# Patient Record
Sex: MALE | Race: WHITE | NOT HISPANIC OR LATINO | Employment: FULL TIME | URBAN - METROPOLITAN AREA
[De-identification: names, ages, dates, MRNs, and addresses within clinical notes are randomized per-mention and may not be internally consistent; named-entity substitution may affect disease eponyms.]

---

## 2017-05-01 ENCOUNTER — TELEPHONE (OUTPATIENT)
Dept: INTERNAL MEDICINE | Facility: CLINIC | Age: 27
End: 2017-05-01

## 2017-05-01 NOTE — TELEPHONE ENCOUNTER
----- Message from Tea Estrella sent at 4/28/2017  6:37 AM CDT -----  Contact: [place] [employer] request  Message     Appointment Request From: Dong Denny        With Provider: Other - (see comments) [oth]        Would Accept With:Request to schedule a test or procedure        Preferred Date Range: From 4/27/2017 To 5/12/2017        Preferred Times: Any        Reason for visit: Request an Appt        Comments:    Would like to schedule test to measure testosterone levels due to recent fatigue, loss of focus, and drop in sex drive.  First appointment available with any provider.

## 2017-06-19 ENCOUNTER — OFFICE VISIT (OUTPATIENT)
Dept: INTERNAL MEDICINE | Facility: CLINIC | Age: 27
End: 2017-06-19
Attending: FAMILY MEDICINE
Payer: COMMERCIAL

## 2017-06-19 ENCOUNTER — CLINICAL SUPPORT (OUTPATIENT)
Dept: INTERNAL MEDICINE | Facility: CLINIC | Age: 27
End: 2017-06-19
Payer: COMMERCIAL

## 2017-06-19 VITALS
BODY MASS INDEX: 24.32 KG/M2 | OXYGEN SATURATION: 98 % | DIASTOLIC BLOOD PRESSURE: 70 MMHG | WEIGHT: 164.19 LBS | SYSTOLIC BLOOD PRESSURE: 108 MMHG | HEART RATE: 60 BPM | HEIGHT: 69 IN

## 2017-06-19 DIAGNOSIS — Z00.00 ROUTINE GENERAL MEDICAL EXAMINATION AT A HEALTH CARE FACILITY: Primary | ICD-10-CM

## 2017-06-19 DIAGNOSIS — Z77.21 EXPOSURE TO BLOOD: ICD-10-CM

## 2017-06-19 DIAGNOSIS — Z00.00 ANNUAL PHYSICAL EXAM: Primary | ICD-10-CM

## 2017-06-19 DIAGNOSIS — M20.60 TOE DEFORMITY, UNSPECIFIED LATERALITY: ICD-10-CM

## 2017-06-19 LAB
ALBUMIN SERPL BCP-MCNC: 4.5 G/DL
ALP SERPL-CCNC: 57 U/L
ALT SERPL W/O P-5'-P-CCNC: 24 U/L
ANION GAP SERPL CALC-SCNC: 10 MMOL/L
AST SERPL-CCNC: 16 U/L
BILIRUB SERPL-MCNC: 1.2 MG/DL
BUN SERPL-MCNC: 14 MG/DL
CALCIUM SERPL-MCNC: 10 MG/DL
CHLORIDE SERPL-SCNC: 104 MMOL/L
CHOLEST/HDLC SERPL: 3.5 {RATIO}
CO2 SERPL-SCNC: 27 MMOL/L
CREAT SERPL-MCNC: 1 MG/DL
ERYTHROCYTE [DISTWIDTH] IN BLOOD BY AUTOMATED COUNT: 12.4 %
EST. GFR  (AFRICAN AMERICAN): >60 ML/MIN/1.73 M^2
EST. GFR  (NON AFRICAN AMERICAN): >60 ML/MIN/1.73 M^2
ESTIMATED AVG GLUCOSE: 97 MG/DL
GLUCOSE SERPL-MCNC: 106 MG/DL
HBA1C MFR BLD HPLC: 5 %
HCT VFR BLD AUTO: 46.3 %
HDL/CHOLESTEROL RATIO: 28.8 %
HDLC SERPL-MCNC: 153 MG/DL
HDLC SERPL-MCNC: 44 MG/DL
HGB BLD-MCNC: 16.1 G/DL
HIV 1+2 AB+HIV1 P24 AG SERPL QL IA: NEGATIVE
LDLC SERPL CALC-MCNC: 82.8 MG/DL
MCH RBC QN AUTO: 30.6 PG
MCHC RBC AUTO-ENTMCNC: 34.8 %
MCV RBC AUTO: 88 FL
NONHDLC SERPL-MCNC: 109 MG/DL
PLATELET # BLD AUTO: 280 K/UL
PMV BLD AUTO: 9.3 FL
POTASSIUM SERPL-SCNC: 4.2 MMOL/L
PROT SERPL-MCNC: 7.5 G/DL
RBC # BLD AUTO: 5.26 M/UL
RPR SER QL: NORMAL
SODIUM SERPL-SCNC: 141 MMOL/L
TRIGL SERPL-MCNC: 131 MG/DL
WBC # BLD AUTO: 4.04 K/UL

## 2017-06-19 PROCEDURE — 85027 COMPLETE CBC AUTOMATED: CPT

## 2017-06-19 PROCEDURE — 99385 PREV VISIT NEW AGE 18-39: CPT | Mod: S$GLB,,, | Performed by: FAMILY MEDICINE

## 2017-06-19 PROCEDURE — 86703 HIV-1/HIV-2 1 RESULT ANTBDY: CPT

## 2017-06-19 PROCEDURE — 86592 SYPHILIS TEST NON-TREP QUAL: CPT

## 2017-06-19 PROCEDURE — 83036 HEMOGLOBIN GLYCOSYLATED A1C: CPT

## 2017-06-19 PROCEDURE — 97802 MEDICAL NUTRITION INDIV IN: CPT | Mod: S$GLB,,, | Performed by: INTERNAL MEDICINE

## 2017-06-19 PROCEDURE — 99999 PR PBB SHADOW E&M-EST. PATIENT-LVL IV: CPT | Mod: PBBFAC,,, | Performed by: FAMILY MEDICINE

## 2017-06-19 PROCEDURE — 36415 COLL VENOUS BLD VENIPUNCTURE: CPT

## 2017-06-19 PROCEDURE — 80053 COMPREHEN METABOLIC PANEL: CPT

## 2017-06-19 PROCEDURE — 80061 LIPID PANEL: CPT

## 2017-06-19 PROCEDURE — 97750 PHYSICAL PERFORMANCE TEST: CPT | Mod: S$GLB,,, | Performed by: INTERNAL MEDICINE

## 2017-06-19 NOTE — PROGRESS NOTES
Nutrition Assessment  Client name:  Dong Denny  :  1990  Age:  26 y.o.  Gender:  male    Client states:  Very pleasant [employer] employee here for his annual [employer] physical.  Missed nutrition consult last year due to lack of availability yet is grateful for today's consult!  Has an unremarkable PMH.  Is health conscious overall, noting that he exercises 5-6x/week, including cardio and strength training.  Recently, incorporated cardio 2x/week due to medical recommendations.  Has been using My Fitness Pal for the past two years, noting that he individualized his goals to include 2300 kcal, 35% CHO, 35% PRO, and 30% fat.  Aims for 150 gm protein/day (1 gm/lb body weight) although typically averages 110 gm daily.  Consumes 40 gm protein post workout via 1 scoop protein powder (24 gm) + 20 oz 2% milk.  Recently, moved near [facility] and since then, has been eating out more frequently than usual, averaging 4x/week in contrast to 1x/week.  Is considering meal delivery companies as a way to reduce eating out temptation.  Overall, was receptive toward nutrition recommendations, expressing gratitude for education received.              No past medical history on file.    Social History    Marital status:  Single  Employment:  [employer]  Social History   Substance Use Topics    Smoking status: Never Smoker    Smokeless tobacco: Not on file    Alcohol use Yes      Comment: socially        Current medications:  has a current medication list which includes the following prescription(s): finasteride.  Vitamins, minerals, and/or supplements:  MVI, whey protein powder (24 gm protein/scoop) post workout  Food allergies or intolerances:  NKFA     Food History  Breakfast:  Skips  Lunch:  Ground turkey + beans or peas/6 inch [employer] rotisserie chicken or meatball marinara sandwich + water  Mid-afternoon Snack:  30 gm almonds  Post workout:  1 scoop whey protein powder (24 gm protein) + 20 oz 2%  "milk  Dinner:  Grilled chicken breast + beans or peas + water    Exercise History:  20-45 minutes cardio 2x/week + 45 minutes strength training 3x/week    Lab Reports   Total Cholesterol:  153    Triglycerides:  131  HDL:  44  LDL:  82.8   Glucose:  106  HbA1c:  5%  BP:  108/70     Weight History  Height:  5' 9"     Weight:  162#  BMI:  23.9  % Body Fat:  17.85%    Diagnosis  RMR (Method:  Body [place]):  1760 kcal  Activity Factor:  1.4  MARINO:  2464 kcal   Nutrition Prescription:  2400 kcal, 45% CHO, 20% PRO, 35% Fat    Food and nutrition-related knowledge deficit related to inadequate nutrition education as evidenced by patient verbalizes inadequate nutrition education re: macronutrient needs.    Intervention    Goals:  1.  Reduce protein intake post workout to 15-25 gm (1/2 scoop whey protein + 8 oz milk) per ACSM guidelines  2.  Aim for 2400 kcal, 45% CHO, 20% PRO, 35% Fat  3.  Limit eating out to 2x/week  4.  Avoid skipping breakfast (examples of healthy breakfast choices provided)  5.  Incorporate ½ plate of non-starchy vegetables with lunch and dinner  6.  Continue current exercise regimen    Reviewed CMP, lipid panel, and HbA1c.  Praised patient for healthy and active lifestyle, encouraging patient to maintain such healthy habits.  Discussed BMR, MARINO, and daily macronutrient goals, specifically daily protein needs, post workout nutrition, and the potential health consequences of excessive protein intake.  Advised patient to avoid skipping meals, providing him with healthy breakfast choices that are light, convenient, and healthy.  Slight changes in lipid panel noted, likely attributed to increase in eating out frequency.  Encouraged patient to reduce eating out frequency, preparing more meals at home inclusive of added non-starchy vegetables.  Provided patient with list of such vegetables and discussed.  Also, answered patient's questions re: meal delivery companies.      Handouts provided:  Meal Planning " Guide  Restaurant Guide  Eat Fit Shopping List  Eat Fit Natalie  Fast Food Guide  Vitamin/Mineral Guide    Monitoring/Evaluation    Monitor the following:  Weight  BMI  Calorie and macronutrient intake    Follow Up Plan:  Follow up with client in 1-2 years

## 2017-06-19 NOTE — PROGRESS NOTES
Subjective:       Patient ID: Dong Denny is a 26 y.o. male.    Chief Complaint: No chief complaint on file.    HPI   Mr. Denny has reported no previous history of cardiovascular or pulmonary disease. He has reported no physical limitations to exercise. Mr. Denny is very interested and knowledgeable about both exercise and fitness. He mentioned to me that he is very conscious of his body fat percentage and resting metabolic rate. He use to take his body fat measurements with a handheld CLEO device, a standing CLEO device and calipers almost weekly. He is not worried about it as much anymore but is still interested in knowing it.     Current Exercise Routine:   - Weight lifting 3 days per week (upper and lower body)   - Cardio 3 days per week (HIIT training 30 minutes)     - Once every 2 week performs 45-60 minutes of endurance cardio    Review of Systems    Objective:      The fitness evaluation results are as follows:    D.O.S. 6/19/2017   Height (in): 69   Weight (lbs): 162   BMI: 23.60340   Body Fat (%): 17.85   Waist (cm): 80   Hip (cm): 93   WHR: 0.86   RBP (mmHg): 110/78   RHR (bpm): 62    Strength R (lbs)t: 96.78205    Strength Lt (lbs): 85.42395   Push-up Assessment: 43   Curl-up Assessment: 33   Flexibility Testing (cm): 40   REE (kcals): 1760     Physical Exam    Assessment:      Age/Gender Stratified Assessment:     Resting BP: Within Testing Limits   Body Fat %: Good   WHR Risk Factor: Low Risk    Strength R: Below Average    Strength L: Below Average   Upper Body Endurance: Excellent   Abdominal Endurance: Average   Lower body Flexibility: Excellent     1. Routine general medical examination at a health care facility        Plan:    Mr. Denny should continue with his current exercise routine in order to maintain his current level of fitness. The focus over the next year should be on increasing  strength. Both hands were measured below average. We talked about  performing dead lifts with a pronated  only for a few repetitions in order to help strengthen this. Daily stretching should also be incorporated to help increase level of flexibility. Lower body chair stretches were demonstrated in order to promote stretching at the workplace.

## 2017-06-19 NOTE — PROGRESS NOTES
Subjective:       Patient ID: Dong Denny is a 26 y.o. male.    Chief Complaint: [employer]    New patient to establish care and annual wellness check, physical exam.        Review of Systems   Constitutional: Negative for chills, fatigue and fever.   HENT: Negative for congestion and trouble swallowing.    Eyes: Negative for redness.   Respiratory: Negative for cough, chest tightness and shortness of breath.    Cardiovascular: Negative for chest pain, palpitations and leg swelling.   Gastrointestinal: Negative for abdominal pain and blood in stool.   Genitourinary: Negative for hematuria.   Musculoskeletal: Negative for arthralgias, back pain, gait problem, joint swelling, myalgias and neck pain.   Skin: Negative for color change and rash.   Neurological: Negative for tremors, speech difficulty, weakness, numbness and headaches.   Hematological: Negative for adenopathy. Does not bruise/bleed easily.   Psychiatric/Behavioral: Negative for behavioral problems, confusion and sleep disturbance. The patient is not nervous/anxious.        Objective:      Physical Exam   Constitutional: He appears well-developed and well-nourished.   HENT:   Head: Normocephalic.   Right Ear: Tympanic membrane, external ear and ear canal normal.   Left Ear: Tympanic membrane, external ear and ear canal normal.   Mouth/Throat: Oropharynx is clear and moist.   Eyes: Pupils are equal, round, and reactive to light.   Neck: Normal range of motion. Neck supple. Carotid bruit is not present. No thyromegaly present.   Cardiovascular: Normal rate, regular rhythm, normal heart sounds and intact distal pulses.  Exam reveals no gallop and no friction rub.    No murmur heard.  Pulmonary/Chest: Effort normal and breath sounds normal.   Abdominal: Soft. Bowel sounds are normal. He exhibits no distension and no mass. There is no tenderness. There is no rebound and no guarding.   Musculoskeletal: Normal range of motion. He exhibits no  edema or tenderness.   Lymphadenopathy:     He has no cervical adenopathy.   Neurological: He is alert. He has normal strength. He displays normal reflexes. No cranial nerve deficit or sensory deficit. Coordination and gait normal.   Skin: Skin is warm and dry.   Psychiatric: He has a normal mood and affect. His behavior is normal. Judgment and thought content normal.   Nursing note and vitals reviewed.      Assessment:       1. Annual physical exam    2. Exposure to blood    3. Toe deformity, unspecified laterality        Plan:   Dong was seen today for [facility].    Diagnoses and all orders for this visit:    Annual physical exam    Exposure to blood  -     HIV-1 and HIV-2 antibodies; Future  -     RPR; Future    Toe deformity, unspecified laterality  -     Ambulatory referral to Podiatry      See meds, orders, follow up, routing and instructions sections of encounter.  Dictation #1  MRN:[id number]  CSN:[id number]
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
## 2017-07-03 ENCOUNTER — OFFICE VISIT (OUTPATIENT)
Dept: PODIATRY | Facility: CLINIC | Age: 27
End: 2017-07-03
Payer: COMMERCIAL

## 2017-07-03 ENCOUNTER — HOSPITAL ENCOUNTER (OUTPATIENT)
Dept: RADIOLOGY | Facility: HOSPITAL | Age: 27
Discharge: HOME | End: 2017-07-03
Attending: PODIATRIST
Payer: COMMERCIAL

## 2017-07-03 ENCOUNTER — PATIENT MESSAGE (OUTPATIENT)
Dept: PODIATRY | Facility: CLINIC | Age: 27
End: 2017-07-03

## 2017-07-03 VITALS
HEIGHT: 69 IN | DIASTOLIC BLOOD PRESSURE: 70 MMHG | WEIGHT: 165.69 LBS | BODY MASS INDEX: 24.54 KG/M2 | HEART RATE: 65 BPM | SYSTOLIC BLOOD PRESSURE: 110 MMHG

## 2017-07-03 DIAGNOSIS — M20.42 HAMMER TOE OF LEFT FOOT: ICD-10-CM

## 2017-07-03 DIAGNOSIS — M24.50 JOINT CONTRACTURE: ICD-10-CM

## 2017-07-03 DIAGNOSIS — Q66.89 CURLY TOE: ICD-10-CM

## 2017-07-03 DIAGNOSIS — M20.42 HAMMER TOE OF LEFT FOOT: Primary | ICD-10-CM

## 2017-07-03 PROCEDURE — 99203 OFFICE O/P NEW LOW 30 MIN: CPT | Mod: S$GLB,,, | Performed by: PODIATRIST

## 2017-07-03 PROCEDURE — 73630 X-RAY EXAM OF FOOT: CPT | Mod: TC,LT

## 2017-07-03 PROCEDURE — 73630 X-RAY EXAM OF FOOT: CPT | Mod: 26,LT,, | Performed by: RADIOLOGY

## 2017-07-03 PROCEDURE — 99999 PR PBB SHADOW E&M-EST. PATIENT-LVL III: CPT | Mod: PBBFAC,,, | Performed by: PODIATRIST

## 2017-07-03 NOTE — PATIENT INSTRUCTIONS
PRE OPERATIVE INSTRUCTIONS      Surgery has been scheduled with Dr. Umanzor on    On the date of surgery, please report to the Pre Operative Registration office 2 hours before surgery.    You should report to:  ______[facility], 2nd floor Surgery Center, [address] [place] [address]  ([phone])  ______ [facility], located on the [place] side of the first floor   of the [facility] ([phone])  ______ The Second Floor Surgery Center, located on the [facility] side of the   second floor of the [facility] ([phone])      Pre Surgical Physical Exam and Testing    - A history and physical exam performed by your primary care physician, internest, cardiologist or pediatrician is required within 30 days prior to your surgery. The completed history & physical note, preop clearance and appropriate tests including EKG, CBC, BMP and CXR must be faxed to our office no later than 3 days prior to surgery.     Fax- [phone]    Please note the following instructions for your upcoming surgery:    1. Do not eat or drink anything after midnight the night before surgery.  2. The night before surgery, take a complete shower or bath and clean the entire extremity with antibacterial or Hibiclens (chlorhexidine gluconate) soap.  3. Stop taking all anti-inflammatory medications 7 days prior to your procedure.    -This includes Ibuprofen, Aleve, Motrin, Advil, Aspirin, Ibuprofen, etc. Tylenol products are OK.  4. If you take Coumadin or Plavix, please discuss stopping these medications with your prescribing physician. These medications should generally be stopped 5-7 days before surgery. Please notify us with what your doctor's instructions are regarding stopping and re-starting these medications.  5. Notify your doctor if you are diabetic and provide information about the   medications you take.    PLEASE NOTE   If you are allergic to any medications,  please inform your doctor or the nurse   responsible for your care.   Arrange for someone to drive you home following surgery. You will not be allowed   to leave the surgical facility alone or drive yourself home following sedation and   anesthesia.   If you have not already done so, please bring a list of your medications with you   the day of your surgery   Refrain from drinking alcoholic beverages for 24 hours before and after surgery    Recommend using knee walker (rent or buy) when nonweightbearing is required.   (Example: [employer] [address] Steerable Knee Walker)    If you have not been contacted by the Pre-Operative Center by one to three days prior to  your surgery, please call [phone] or [phone] between the hours of 8:00 a.m. and 4:00 p.m. Monday through Friday.    If you have any questions, please contact our office.   [phone]

## 2017-07-12 NOTE — PROGRESS NOTES
Subjective:      Patient ID: Dong Denny is a 26 y.o. male.    Chief Complaint: Foot Problem (left ft. 5th toe) and Toe Pain (PCP Dr. RODRIGUEZ 9/19/16)    Dong is a 26 y.o. male who presents to the podiatry clinic  with complaint of  left foot pain. Onset of the symptoms was several years ago. Precipitating event: position of little toe rubbing in shoes. Current symptoms include: pain at tip at top of left 5th toe with activity and pressure in most closed toe shoes. Aggravating factors: running and walking, especially bad with playing soccer. Symptoms have waxed and waned but are worse overall. Patient has had no prior foot problems. Evaluation to date: none. Treatment to date: avoidance of offending activity, OTC analgesics which are not very effective and soft and extra depth shoes. Patients rates pain 8/10 on pain scale.        Review of Systems   Constitution: Negative for chills, fever, weakness, malaise/fatigue and night sweats.   Cardiovascular: Negative for chest pain, leg swelling, orthopnea and palpitations.   Respiratory: Negative for cough, shortness of breath and wheezing.    Skin: Negative for color change, itching, poor wound healing and rash.   Musculoskeletal: Positive for joint pain. Negative for arthritis, gout, joint swelling, muscle weakness and myalgias.   Gastrointestinal: Negative for abdominal pain, constipation and nausea.   Neurological: Negative for disturbances in coordination, dizziness, focal weakness, numbness and tremors.           Objective:      Physical Exam   Constitutional: He is oriented to person, place, and time. Vital signs are normal. He appears well-developed. He is cooperative. No distress.   Cardiovascular: Intact distal pulses.    Pulses:       Dorsalis pedis pulses are 2+ on the right side, and 2+ on the left side.        Posterior tibial pulses are 2+ on the right side, and 2+ on the left side.   Musculoskeletal:        Right ankle: Normal.        Left ankle:  Normal.        Right foot: There is normal range of motion, no bony tenderness, normal capillary refill, no crepitus and no deformity.        Left foot: There is tenderness and deformity. There is normal range of motion, no bony tenderness, normal capillary refill and no crepitus.   Tenderness at distal tip and dorsal PIPJ, left 5th toe.    Reducible flexor contractures at the and PIPJ and DIPJ of toes 2-4, bilat. 5th toe contracture is only partially reducible, left.     Mid and hindfoot alignment neurtral in stance.        Neurological: He is alert and oriented to person, place, and time. He has normal strength. He displays no atrophy. No sensory deficit. He exhibits normal muscle tone. Gait normal.   Reflex Scores:       Achilles reflexes are 2+ on the right side and 2+ on the left side.  Skin: Skin is intact. No ecchymosis and no lesion noted. No erythema. Nails show no clubbing.   No foot and ankle edema.  No open wounds.               Assessment:       Encounter Diagnoses   Name Primary?    Hammer toe of left foot Yes    Joint contracture     Curly toe          Plan:       Dong was seen today for foot problem and toe pain.    Diagnoses and all orders for this visit:    Hammer toe of left foot  -     X-Ray Foot Complete Left; Future    Joint contracture  -     X-Ray Foot Complete Left; Future    Curly toe      I counseled the patient on his conditions, their implications and medical management.    Xray reviewed with patient noting flexor contractions a lesser toe IPJ joints, especially 4th and 5th. Concern for arthritic changes at 5th IPJ. Hindfoot and MTPJ alignment intact.     Discussed conservative treatments with padding, accommodative shoes, flexor stretching, splints vs. Surgery to reduce flexor contraction with flexor tenotomy and/or PIP arthroplasty.     He is interested in surgical treatment as he feels he has exhausted theses conservative options. He would like to plan the procedure after his  upcoming hiking trip to [place].     He will f/u for preop consult, prn           8/14/2017, Addendum: Patient called to request surgery to left 5th toe. Pain at left 5th toe has continued despite conservative care. Denies injuries or new problems. He would like to undergo correction of hammertoe with IPJ arthroplasty and/or flexor tenotomy as previously discussed next week, 8/24/17. States he has appt with PCP this week for pre op clearance. Discussed risks, benefits and potential complications in detail and all questions discussed. He has no other concerns at this time. We will review consent form on the morning of surgery.    8/24/17, Addendum: Patient seen in preop with plan for left 5th PIP joint arthroplasty, possible FDL tenotomy. He was cleared by PCP for surgery. Long discussion with patient reagarding the procedure in detail. Patient understands all risks, potential complications, and alternatives, including, but not limited to those listed on the consent form. All questions were answered. No guarantees given nor implied as to outcome. Informed verbal and written consent was obtained. Consent forms read, signed, witnessed. Patient is ready for surgery

## 2017-08-10 ENCOUNTER — PATIENT MESSAGE (OUTPATIENT)
Dept: PODIATRY | Facility: CLINIC | Age: 27
End: 2017-08-10

## 2017-08-14 ENCOUNTER — OFFICE VISIT (OUTPATIENT)
Dept: INTERNAL MEDICINE | Facility: CLINIC | Age: 27
End: 2017-08-14
Payer: COMMERCIAL

## 2017-08-14 ENCOUNTER — TELEPHONE (OUTPATIENT)
Dept: PODIATRY | Facility: CLINIC | Age: 27
End: 2017-08-14

## 2017-08-14 VITALS
HEART RATE: 81 BPM | WEIGHT: 167.13 LBS | HEIGHT: 69 IN | SYSTOLIC BLOOD PRESSURE: 102 MMHG | BODY MASS INDEX: 24.75 KG/M2 | DIASTOLIC BLOOD PRESSURE: 80 MMHG

## 2017-08-14 DIAGNOSIS — Q66.89 CURLY TOE: ICD-10-CM

## 2017-08-14 DIAGNOSIS — M20.42 HAMMER TOE OF LEFT FOOT: Primary | ICD-10-CM

## 2017-08-14 DIAGNOSIS — M20.42 HAMMERTOE OF LEFT FOOT: ICD-10-CM

## 2017-08-14 DIAGNOSIS — M24.50 JOINT CONTRACTURE: ICD-10-CM

## 2017-08-14 DIAGNOSIS — Z01.810 PREOP CARDIOVASCULAR EXAM: Primary | ICD-10-CM

## 2017-08-14 PROCEDURE — 3008F BODY MASS INDEX DOCD: CPT | Mod: S$GLB,,, | Performed by: INTERNAL MEDICINE

## 2017-08-14 PROCEDURE — 99214 OFFICE O/P EST MOD 30 MIN: CPT | Mod: S$GLB,,, | Performed by: INTERNAL MEDICINE

## 2017-08-14 PROCEDURE — 99999 PR PBB SHADOW E&M-EST. PATIENT-LVL III: CPT | Mod: PBBFAC,,, | Performed by: INTERNAL MEDICINE

## 2017-08-14 NOTE — PROGRESS NOTES
Subjective:       Patient ID: Dong Denny is a 26 y.o. male.    Chief Complaint: Annual Exam    Preop for foot surgery.   No abnormal bleeding bruising or clotting. No problems with anesthesia.  Patient's PCP was unavailable.  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  He has not had any problems with anesthesia nor any family history.  He was told this is being done under sedation but I do not see that written in the record.  Labs EKG and chest x-ray have been stable recently and within the last year.  He does not take any blood thinners or regular anti-inflammatories.      Review of Systems   Constitutional: Negative for activity change and unexpected weight change.   HENT: Negative for hearing loss, rhinorrhea and trouble swallowing.    Eyes: Negative for discharge and visual disturbance.   Respiratory: Negative for chest tightness and wheezing.    Cardiovascular: Negative for chest pain and palpitations.   Gastrointestinal: Negative for blood in stool, constipation, diarrhea and vomiting.   Endocrine: Negative for polydipsia and polyuria.   Genitourinary: Negative for difficulty urinating, hematuria and urgency.   Musculoskeletal: Positive for arthralgias (left foot toe pain). Negative for joint swelling and neck pain.   Neurological: Negative for weakness and headaches.   Psychiatric/Behavioral: Negative for confusion and dysphoric mood.       Objective:      Physical Exam   Constitutional: He is oriented to person, place, and time. He appears well-developed and well-nourished. No distress.   HENT:   Head: Normocephalic and atraumatic.   Mouth/Throat: No oropharyngeal exudate.   TM's clear, pharynx clear   Eyes: Conjunctivae and EOM are normal. Pupils are equal, round, and reactive to light. No scleral icterus.   Neck: Normal range of motion. Neck supple. No thyromegaly present.   No supraclavicular nodes palpated   Cardiovascular: Normal rate, regular rhythm and normal heart  sounds.    No murmur heard.  Pulmonary/Chest: Effort normal and breath sounds normal. He has no wheezes.   Abdominal: Soft. Bowel sounds are normal. He exhibits no mass. There is no tenderness.   Musculoskeletal: He exhibits no edema.   Lymphadenopathy:     He has no cervical adenopathy.   Neurological: He is alert and oriented to person, place, and time.   Skin: No pallor.   Psychiatric: He has a normal mood and affect.       Assessment:       1. Preop cardiovascular exam    2. Hammertoe of left foot        Plan:       Dong was seen today for annual exam.    Diagnoses and all orders for this visit:    Preop cardiovascular exam    Hammertoe of left foot          No personal hx or family history problems with anesthesia.   Hold aspirin, NSAIDS, Fish oil or Omega 3's for 5 days.   Pt cleared for foot surgery.   Note to Podiatry.

## 2017-08-22 ENCOUNTER — PATIENT MESSAGE (OUTPATIENT)
Dept: PODIATRY | Facility: CLINIC | Age: 27
End: 2017-08-22

## 2017-08-23 ENCOUNTER — ANESTHESIA EVENT (OUTPATIENT)
Dept: SURGERY | Facility: HOSPITAL | Age: 27
End: 2017-08-23
Payer: COMMERCIAL

## 2017-08-23 NOTE — PRE-PROCEDURE INSTRUCTIONS
Spoke with Patient.  NPO, medication, and pre-op instructions reviewed.  Denies previous problems with Anesthesia.  Is asking when he will be able to drive post-op.  Verbalized understanding of instructions.

## 2017-08-24 ENCOUNTER — TELEPHONE (OUTPATIENT)
Dept: PODIATRY | Facility: CLINIC | Age: 27
End: 2017-08-24

## 2017-08-24 ENCOUNTER — HOSPITAL ENCOUNTER (OUTPATIENT)
Facility: HOSPITAL | Age: 27
Discharge: HOME | End: 2017-08-24
Attending: PODIATRIST | Admitting: PODIATRIST
Payer: COMMERCIAL

## 2017-08-24 ENCOUNTER — ANESTHESIA (OUTPATIENT)
Dept: SURGERY | Facility: HOSPITAL | Age: 27
End: 2017-08-24
Payer: COMMERCIAL

## 2017-08-24 ENCOUNTER — TELEPHONE (OUTPATIENT)
Dept: INTERNAL MEDICINE | Facility: CLINIC | Age: 27
End: 2017-08-24

## 2017-08-24 VITALS
WEIGHT: 162 LBS | OXYGEN SATURATION: 100 % | HEART RATE: 61 BPM | SYSTOLIC BLOOD PRESSURE: 111 MMHG | DIASTOLIC BLOOD PRESSURE: 65 MMHG | BODY MASS INDEX: 23.99 KG/M2 | RESPIRATION RATE: 16 BRPM | TEMPERATURE: 98 F | HEIGHT: 69 IN

## 2017-08-24 DIAGNOSIS — M20.40 HAMMER TOE: ICD-10-CM

## 2017-08-24 PROCEDURE — 25000003 PHARM REV CODE 250: Performed by: PODIATRIST

## 2017-08-24 PROCEDURE — 37000008 HC ANESTHESIA 1ST 15 MINUTES: Performed by: PODIATRIST

## 2017-08-24 PROCEDURE — 97161 PT EVAL LOW COMPLEX 20 MIN: CPT

## 2017-08-24 PROCEDURE — 63600175 PHARM REV CODE 636 W HCPCS: Performed by: NURSE ANESTHETIST, CERTIFIED REGISTERED

## 2017-08-24 PROCEDURE — D9220A PRA ANESTHESIA: Mod: CRNA,,, | Performed by: NURSE ANESTHETIST, CERTIFIED REGISTERED

## 2017-08-24 PROCEDURE — S0020 INJECTION, BUPIVICAINE HYDRO: HCPCS | Performed by: PODIATRIST

## 2017-08-24 PROCEDURE — D9220A PRA ANESTHESIA: Mod: ANES,,, | Performed by: ANESTHESIOLOGY

## 2017-08-24 PROCEDURE — 27201423 OPTIME MED/SURG SUP & DEVICES STERILE SUPPLY: Performed by: PODIATRIST

## 2017-08-24 PROCEDURE — 25000003 PHARM REV CODE 250: Performed by: ANESTHESIOLOGY

## 2017-08-24 PROCEDURE — 36000706: Performed by: PODIATRIST

## 2017-08-24 PROCEDURE — 25000003 PHARM REV CODE 250: Performed by: NURSE ANESTHETIST, CERTIFIED REGISTERED

## 2017-08-24 PROCEDURE — 36000707: Performed by: PODIATRIST

## 2017-08-24 PROCEDURE — 71000015 HC POSTOP RECOV 1ST HR: Performed by: PODIATRIST

## 2017-08-24 PROCEDURE — 37000009 HC ANESTHESIA EA ADD 15 MINS: Performed by: PODIATRIST

## 2017-08-24 PROCEDURE — 28285 REPAIR OF HAMMERTOE: CPT | Mod: ,,, | Performed by: PODIATRIST

## 2017-08-24 PROCEDURE — 71000033 HC RECOVERY, INTIAL HOUR: Performed by: PODIATRIST

## 2017-08-24 RX ORDER — HYDROMORPHONE HYDROCHLORIDE 1 MG/ML
0.2 INJECTION, SOLUTION INTRAMUSCULAR; INTRAVENOUS; SUBCUTANEOUS EVERY 5 MIN PRN
Status: DISCONTINUED | OUTPATIENT
Start: 2017-08-24 | End: 2017-08-24 | Stop reason: HOSPADM

## 2017-08-24 RX ORDER — LIDOCAINE HYDROCHLORIDE 20 MG/ML
INJECTION, SOLUTION INFILTRATION; PERINEURAL
Status: DISCONTINUED
Start: 2017-08-24 | End: 2017-08-24 | Stop reason: WASHOUT

## 2017-08-24 RX ORDER — LIDOCAINE HCL/PF 100 MG/5ML
SYRINGE (ML) INTRAVENOUS
Status: DISCONTINUED | OUTPATIENT
Start: 2017-08-24 | End: 2017-08-24

## 2017-08-24 RX ORDER — BUPIVACAINE HYDROCHLORIDE 5 MG/ML
INJECTION, SOLUTION EPIDURAL; INTRACAUDAL
Status: DISCONTINUED | OUTPATIENT
Start: 2017-08-24 | End: 2017-08-24 | Stop reason: HOSPADM

## 2017-08-24 RX ORDER — HYDROCODONE BITARTRATE AND ACETAMINOPHEN 5; 325 MG/1; MG/1
1 TABLET ORAL EVERY 4 HOURS PRN
Status: DISCONTINUED | OUTPATIENT
Start: 2017-08-24 | End: 2017-08-24 | Stop reason: HOSPADM

## 2017-08-24 RX ORDER — OXYCODONE AND ACETAMINOPHEN 5; 325 MG/1; MG/1
1 TABLET ORAL EVERY 6 HOURS PRN
Qty: 20 TABLET | Refills: 0 | Status: SHIPPED | OUTPATIENT
Start: 2017-08-24 | End: 2017-09-25

## 2017-08-24 RX ORDER — FENTANYL CITRATE 50 UG/ML
INJECTION, SOLUTION INTRAMUSCULAR; INTRAVENOUS
Status: DISCONTINUED | OUTPATIENT
Start: 2017-08-24 | End: 2017-08-24

## 2017-08-24 RX ORDER — PROPOFOL 10 MG/ML
VIAL (ML) INTRAVENOUS CONTINUOUS PRN
Status: DISCONTINUED | OUTPATIENT
Start: 2017-08-24 | End: 2017-08-24

## 2017-08-24 RX ORDER — BUPIVACAINE HYDROCHLORIDE 5 MG/ML
INJECTION, SOLUTION EPIDURAL; INTRACAUDAL
Status: DISCONTINUED
Start: 2017-08-24 | End: 2017-08-24 | Stop reason: HOSPADM

## 2017-08-24 RX ORDER — MIDAZOLAM HYDROCHLORIDE 1 MG/ML
INJECTION INTRAMUSCULAR; INTRAVENOUS
Status: DISCONTINUED | OUTPATIENT
Start: 2017-08-24 | End: 2017-08-24

## 2017-08-24 RX ORDER — LIDOCAINE HYDROCHLORIDE 10 MG/ML
1 INJECTION, SOLUTION EPIDURAL; INFILTRATION; INTRACAUDAL; PERINEURAL ONCE
Status: COMPLETED | OUTPATIENT
Start: 2017-08-24 | End: 2017-08-24

## 2017-08-24 RX ORDER — SODIUM CHLORIDE 0.9 % (FLUSH) 0.9 %
3 SYRINGE (ML) INJECTION
Status: DISCONTINUED | OUTPATIENT
Start: 2017-08-24 | End: 2017-08-24 | Stop reason: HOSPADM

## 2017-08-24 RX ORDER — PROPOFOL 10 MG/ML
VIAL (ML) INTRAVENOUS
Status: DISCONTINUED | OUTPATIENT
Start: 2017-08-24 | End: 2017-08-24

## 2017-08-24 RX ORDER — SODIUM CHLORIDE 9 MG/ML
INJECTION, SOLUTION INTRAVENOUS CONTINUOUS
Status: DISCONTINUED | OUTPATIENT
Start: 2017-08-24 | End: 2017-08-24 | Stop reason: HOSPADM

## 2017-08-24 RX ADMIN — MIDAZOLAM HYDROCHLORIDE 2 MG: 1 INJECTION, SOLUTION INTRAMUSCULAR; INTRAVENOUS at 06:08

## 2017-08-24 RX ADMIN — FENTANYL CITRATE 100 MCG: 50 INJECTION, SOLUTION INTRAMUSCULAR; INTRAVENOUS at 07:08

## 2017-08-24 RX ADMIN — PROPOFOL 50 MG: 10 INJECTION, EMULSION INTRAVENOUS at 07:08

## 2017-08-24 RX ADMIN — SODIUM CHLORIDE: 0.9 INJECTION, SOLUTION INTRAVENOUS at 06:08

## 2017-08-24 RX ADMIN — LIDOCAINE HYDROCHLORIDE 100 MG: 20 INJECTION, SOLUTION INTRAVENOUS at 07:08

## 2017-08-24 RX ADMIN — DEXTROSE 2 G: 50 INJECTION, SOLUTION INTRAVENOUS at 07:08

## 2017-08-24 RX ADMIN — LIDOCAINE HYDROCHLORIDE 10 MG: 10 INJECTION, SOLUTION EPIDURAL; INFILTRATION; INTRACAUDAL; PERINEURAL at 06:08

## 2017-08-24 RX ADMIN — PROPOFOL 100 MCG/KG/MIN: 10 INJECTION, EMULSION INTRAVENOUS at 07:08

## 2017-08-24 NOTE — INTERVAL H&P NOTE
The patient has been examined and the H&P has been reviewed:    I concur with the findings and no changes have occurred since H&P was written.     Vern Umanzor DPM        Anesthesia/Surgery risks, benefits and alternative options discussed and understood by patient/family.          Active Hospital Problems    Diagnosis  POA    Hammer toe [M20.40]  Yes      Resolved Hospital Problems    Diagnosis Date Resolved POA   No resolved problems to display.

## 2017-08-24 NOTE — BRIEF OP NOTE
[facility]  Brief Operative Note     SUMMARY     Surgery Date: 8/24/2017     Surgeon(s) and Role:     * Vern Umanzor DPM - Primary     * Lisa Barksdale MD - Resident - Assisting        Pre-op Diagnosis:  Curly toe [Q66.89]  Joint contracture [M24.50]  Hammer toe of left foot [M20.42]    Post-op Diagnosis:  Post-Op Diagnosis Codes:     * Curly toe [Q66.89]     * Joint contracture [M24.50]     * Hammer toe of left foot [M20.42]    Procedure(s) (LRB):  REPAIR-HAMMER TOE with PIP joint arthroplasty, 5th (Left)  TENOTOMY-FLEXOR (Left), FDL, 5th toe    Anesthesia: Local MAC    Description of the findings of the procedure: arthritic PIPJ     Key Components: 5th digit arthroplasty with flexor tenotomy     Estimated Blood Loss: < 5 cc        Specimens:   Specimen (12h ago through future)    None          Discharge Note    SUMMARY     Admit Date: 8/24/2017    Discharge Date and Time:  08/24/2017 7:53 AM    Hospital Course (synopsis of major diagnoses, care, treatment, and services provided during the course of the hospital stay):      Final Diagnosis: Post-Op Diagnosis Codes:     * Curly toe [Q66.89]     * Joint contracture [M24.50]     * Hammer toe of left foot [M20.42]    Disposition: Home or Self Care    Follow Up/Patient Instructions:     Medications:  Reconciled Home Medications:   Current Discharge Medication List      CONTINUE these medications which have NOT CHANGED    Details   finasteride (PROPECIA) 1 mg tablet Take 1 mg by mouth every morning.   Refills: 6           No discharge procedures on file.

## 2017-08-24 NOTE — DISCHARGE SUMMARY
"[facility]  Podiatry  Discharge Summary      Patient Name: Dong Denny  MRN: [id number]  Admission Date: 8/24/2017  Hospital Length of Stay: 0 days  Discharge Date and Time:  08/24/2017 8:04 AM  Attending Physician: Vern Umanzor DPM   Discharging Provider: Lisa Barksdale MD  Primary Care Provider: Toby Mcdaniel MD    HPI: Pt presents for outpt procedure    Procedure(s) (LRB):  REPAIR-HAMMER TOE (Left)  TENOTOMY-FLEXOR (Left)      Hospital Course: Patient admitted for outpatient foot surgery. Procedure completed without complications. He was discharged home with prescriptions for pain medication and will follow up with Dr. Umanzor next week.         Pending Diagnostic Studies:     Procedure Component Value Units Date/Time    X-Ray Foot Complete Left [[id number]]     Order Status:  Sent Lab Status:  No result         Final Active Diagnoses:    Diagnosis Date Noted POA    Hammer toe [M20.40] 08/24/2017 Yes      Problems Resolved During this Admission:    Diagnosis Date Noted Date Resolved POA      Discharged Condition: good    Disposition: Home or Self Care    Follow Up:    Patient Instructions:     CRUTCHES FOR HOME USE   Order Specific Question Answer Comments   Type: Axillary    Height: 5' 9" (1.753 m)    Weight: 73.5 kg (162 lb)    Length of need (1-99 months): 2      Diet general     Activity as tolerated     Keep surgical extremity elevated     Ice to affected area     Call MD for:  temperature >100.4     Call MD for:  persistent nausea and vomiting     Call MD for:  severe uncontrolled pain     Call MD for:  difficulty breathing, headache or visual disturbances     Call MD for:  redness, tenderness, or signs of infection (pain, swelling, redness, odor or green/yellow discharge around incision site)     Call MD for:  hives     Call MD for:  persistent dizziness or light-headedness     Call MD for:  extreme fatigue     Call MD for:     Leave dressing on - Keep it clean, " dry, and intact until clinic visit     Weight bearing restrictions (specify)   Scheduling Instructions: Minimal weight bearing to surgical extremity       Medications:  Reconciled Home Medications:   Current Discharge Medication List      START taking these medications    Details   oxycodone-acetaminophen (PERCOCET) 5-325 mg per tablet Take 1 tablet by mouth every 6 (six) hours as needed for Pain.  Qty: 20 tablet, Refills: 0         CONTINUE these medications which have NOT CHANGED    Details   finasteride (PROPECIA) 1 mg tablet Take 1 mg by mouth every morning.   Refills: 6           Time spent on the discharge of patient: 5 minutes    Lisa Barksdale MD  Podiatry  [facility]

## 2017-08-24 NOTE — ANESTHESIA PREPROCEDURE EVALUATION
08/24/2017  Dong Denny is a 26 y.o., male.  There is no problem list on file for this patient.  History reviewed. No pertinent past medical history.      Anesthesia Evaluation    I have reviewed the Patient Summary Reports.    I have reviewed the Nursing Notes.   I have reviewed the Medications.     Review of Systems      Physical Exam  General:  Well nourished    Airway/Jaw/Neck:  Airway Findings: Mouth Opening: Normal General Airway Assessment: Adult  Mallampati: II  Improves to II with phonation.  Jaw/Neck Findings:  Limited Ability to Prognath  Neck ROM: Normal ROM     Eyes/Ears/Nose:  Eyes/Ears/Nose Findings:    Dental:  Dental Findings: In tact   Chest/Lungs:  Chest/Lungs Findings: Clear to auscultation, Normal Respiratory Rate     Heart/Vascular:  Heart Findings: Rate: Normal  Rhythm: Regular Rhythm  Sounds: Normal     Abdomen:  Abdomen Findings:  Normal     Musculoskeletal:  Musculoskeletal Findings:    Skin:  Skin Findings:     Mental Status:  Mental Status Findings:  Cooperative, Alert and Oriented         Anesthesia Plan  Type of Anesthesia, risks & benefits discussed:  Anesthesia Type:  general, MAC  Patient's Preference:   Intra-op Monitoring Plan:   Intra-op Monitoring Plan Comments:   Post Op Pain Control Plan:   Post Op Pain Control Plan Comments:   Induction:   IV  Beta Blocker:  Patient is not currently on a Beta-Blocker (No further documentation required).       Informed Consent: Patient understands risks and agrees with Anesthesia plan.  Questions answered. Anesthesia consent signed with patient.  ASA Score: 1     Day of Surgery Review of History & Physical:    H&P update referred to the surgeon.         Ready For Surgery From Anesthesia Perspective.

## 2017-08-24 NOTE — PT/OT/SLP PROGRESS
Physical Therapy Crutch Evaluation/Training    Dong Denny   MRN: [id number]   Admitting Diagnosis: <principal problem not specified>    PT Received On: 08/24/17  PT Start Time: 0849     PT Stop Time: 0857    PT Total Time (min): 8 min       Billable Minutes:  Evaluation 8     Order: PT evaluation per Lisa Barksdale MD  Order Date: 08/24/17     Precautions Weight Bearing Status: non weight bearing: left leg    Patient Active Problem List   Diagnosis    Hammer toe     History reviewed. No pertinent past medical history.  Past Surgical History:   Procedure Laterality Date    TONSILLECTOMY      TURBINATE RESECTION Bilateral 2010, 2006    done twice    TYMPANOSTOMY TUBE PLACEMENT Bilateral 1990       Subjective Information     Prior level of function: independent  Residence: lives alone number of outside stairs: 1 flight, B HR present   Support available: family  Equipment owned: None  Mental Status: Oriented X 4 and Alert  Skin: Intact  Sensation: Intact; L LE numb from anesthesia  Posture: Good  Hearing: Intact  Vision:  Intact    Pain at time of assessment: none reported located in left leg    Objective findings/Assessment  Bed mobility: Mod (I)  Transfers: (I)  Gross assessment: WFL  Standing balance: Good  ROM: WFL  Strength: WFL  Patient requires crutch fitting and training.    Treatment  Gait: ~20 feet with bilateral crutches; demonstrating initially step-to gait pattern with progression to swing-through; minor LOB, but able to self-correct safely.   Stairs: unable to attempt; verbalized understanding or PT education of possible techniques using bilateral handrails.   Transfers: performed sit to stand from stretcher; appropriate use of AD  Therapeutic Exercise: n/a  Education provided in form of: verbalization and demonstration; verbalization from pt and mother regarding understanding.     AM-PAC 6 CLICK MOBILITY  How much help from another person does this patient currently need?   1 =  Unable, Total/Dependent Assistance  2 = A lot, Maximum/Moderate Assistance  3 = A little, Minimum/Contact Guard/Supervision  4 = None, Modified Starr/Independent    Turning over in bed (including adjusting bedclothes, sheets and blankets)?: 4  Sitting down on and standing up from a chair with arms (e.g., wheelchair, bedside commode, etc.): 4  Moving from lying on back to sitting on the side of the bed?: 4  Moving to and from a bed to a chair (including a wheelchair)?: 4  Need to walk in hospital room?: 4  Climbing 3-5 steps with a railing?: 3  Total Score: 23     AM-PAC Raw Score CMS G-Code Modifier Level of Impairment Assistance   6 % Total / Unable   7 - 9 CM 80 - 100% Maximal Assist   10 - 14 CL 60 - 80% Moderate Assist   15 - 19 CK 40 - 60% Moderate Assist   20 - 22 CJ 20 - 40% Minimal Assist   23 CI 1-20% SBA / CGA   24 CH 0% Independent/ Mod I     Goals/Discharge Status  Patient safely and effectively ambulates with crutches with  supervision,  non weight bearing: left leg on level surfaces.    Recommended Plan:  Patient to be discharged to home with family support. No further skilled PT intervention required.     Julieth Cummings, PT  08/24/2017

## 2017-08-24 NOTE — OP NOTE
Operative Note       Surgery Date: 8/24/2017     Surgeon(s) and Role:     * Vern Umanzor DPM - Primary     * Lisa Barksdale MD - Resident - Assisting    Pre-op Diagnosis:    Curly toe [Q66.89]  Joint contracture [M24.50]  Hammer toe of left foot [M20.42]    Post-op Diagnosis: Post-Op Diagnosis Codes:     * Curly toe [Q66.89]     * Joint contracture [M24.50]     * Hammer toe of left foot [M20.42]    Procedure(s) (LRB):  REPAIR-HAMMER TOE, with derotational arthroplasty, 5th toe (Left)  TENOTOMY-FLEXOR - FDL to 5th toe (Left)    Anesthesia: Local + MAC    Procedure in Detail/Findings:     PRE-PROCEDURE:   The patient was brought into the operating room and remained on the stretcher for the duration of the procedure. Reverse adame block of 13cc of 0.25% marcaine plain around the 5th ray was performed. Ankle tourniquet was placed. The foot and leg was scrubbed, prepped, and draped in the usual aseptic manner. Time out was performed before incision. The left foot was exsanguinated and tourniquet inflated to 250 mm Hg.      PROCEDURE:  Attention was drawn to the 5th digit of the left foot where a derotational arthroplasty of the PIP joint was performed. Attention was directed towards the 5th digit where an elliptical incision was made over the dorsal PIP joint from proximal-lateral to distal-medial in order to acheive derotation of the toe during closure.  The incision was deepened through the subcutaneous tissue with anatomic dissection and into the PIPJ using a transverse tenotomy and capsulotomy. Neurovascular structures were retracted and protected and ligated as needed.  The head of the proximal phalanx was exposed and resected using the sagittal saw for a digital arthroplasty.     The flexion contracture of the FDL tendon remained after arthroplasty and it was decided to perform a flexor tenotomy. The incision was further deepened to and blunt dissection was performed to expose and release the flexor  digitorum longus tendon to the 5th toe. The toe deformity was now completely reducible with ankle and forefoot loaded.      The incision site was copiously irrigated with saline. The joint capsule, subcutaneous tissue and skin was closed in layers with 4-0 vicryl and 4-0 nylon, respectively. The tourniquet was then released with brisk reperfusion to all digits of the extremity.     AFTER THE PROCEDURE:   The incisions were dressed with adaptic and covered with a sterile compressive dressing consisting of 4 X 4s and toni and cast padding. An ACE wrap and a DARCO shoe was then applied. The patient tolerated the procedure and anesthesia well. The patient was transported to recovery with vital signs stable and vascular status intact.     Estimated Blood Loss: < 5 cc            Specimens     None        Implants: * No implants in log *     Antibiotic: Ancef 2 g IV           Disposition: PACU - hemodynamically stable.           Condition: Good    Attestation:  I was present and scrubbed for the entire procedure.    Vern Umanzor DPM           Discharge Note    Admit Date: 8/24/2017    Attending Physician: No att. providers found     Discharge Physician: No att. providers found    Final Diagnosis: Post-Op Diagnosis Codes:     * Curly toe [Q66.89]     * Joint contracture [M24.50]     * Hammer toe of left foot [M20.42]    Disposition: Home or Self Care    Patient Instructions:   Discharge Medication List as of 8/24/2017  8:09 AM      START taking these medications    Details   oxycodone-acetaminophen (PERCOCET) 5-325 mg per tablet Take 1 tablet by mouth every 6 (six) hours as needed for Pain., Starting Thu 8/24/2017, Print         CONTINUE these medications which have NOT CHANGED    Details   finasteride (PROPECIA) 1 mg tablet Take 1 mg by mouth every morning. , Starting Wed 8/31/2016, Historical Med             Discharge Procedure Orders (must include Diet, Follow-up, Activity)    Discharge Procedure Orders (must include  "Diet, Follow-up, Activity)  CRUTCHES FOR HOME USE   Order Specific Question Answer Comments   Type: Axillary    Height: 5' 9" (1.753 m)    Weight: 73.5 kg (162 lb)    Length of need (1-99 months): 2    Vendor: [employer] BONNIE Castillo to deliver bedside   Expected Date of Delivery: 8/24/2017      Diet general     Activity as tolerated     Keep surgical extremity elevated     Ice to affected area     Call MD for:  temperature >100.4     Call MD for:  persistent nausea and vomiting     Call MD for:  severe uncontrolled pain     Call MD for:  difficulty breathing, headache or visual disturbances     Call MD for:  redness, tenderness, or signs of infection (pain, swelling, redness, odor or green/yellow discharge around incision site)     Call MD for:  hives     Call MD for:  persistent dizziness or light-headedness     Call MD for:  extreme fatigue     Call MD for:     Leave dressing on - Keep it clean, dry, and intact until clinic visit     Weight bearing restrictions (specify)   Scheduling Instructions: Minimal weight bearing to surgical extremity          Discharge Date: 8/24/2017  8:55 AM  "

## 2017-08-24 NOTE — TRANSFER OF CARE
"Anesthesia Transfer of Care Note    Patient: Dong Denny    Procedure(s) Performed: Procedure(s) (LRB):  REPAIR-HAMMER TOE (Left)  TENOTOMY-FLEXOR (Left)    Patient location: PACU    Anesthesia Type: general    Transport from OR: Transported from OR on room air with adequate spontaneous ventilation    Post pain: adequate analgesia    Post assessment: no apparent anesthetic complications    Post vital signs: stable    Level of consciousness: sedated    Nausea/Vomiting: no nausea/vomiting    Complications: none    Transfer of care protocol was followed      Last vitals:   Visit Vitals  Ht 5' 9" (1.753 m)   Wt 73.5 kg (162 lb)   BMI 23.92 kg/m²     "

## 2017-08-24 NOTE — PLAN OF CARE
Patient ambulated to preop area. No signs of distress noted. Physician at the bedside. VS stable. Patient safety maintained. Will continue to monitor.

## 2017-08-25 NOTE — ANESTHESIA POSTPROCEDURE EVALUATION
"Anesthesia Post Evaluation    Patient: Dong Denny    Procedure(s) Performed: Procedure(s) (LRB):  REPAIR-HAMMER TOE (Left)  TENOTOMY-FLEXOR (Left)    Final Anesthesia Type: general  Patient location during evaluation: PACU  Patient participation: Yes- Able to Participate  Level of consciousness: awake and alert and oriented  Pain management: adequate  Airway patency: patent  PONV status at discharge: No PONV  Anesthetic complications: no      Cardiovascular status: blood pressure returned to baseline and hemodynamically stable  Respiratory status: unassisted  Hydration status: euvolemic  Follow-up not needed.        Visit Vitals  /65   Pulse 61   Temp 36.7 °C (98 °F) (Temporal)   Resp 16   Ht 5' 9" (1.753 m)   Wt 73.5 kg (162 lb)   SpO2 100%   BMI 23.92 kg/m²       Pain/Josefina Score: Pain Assessment Performed: Yes (8/24/2017  8:45 AM)  Presence of Pain: denies (8/24/2017  8:45 AM)  Josefina Score: 10 (8/24/2017  8:45 AM)      "

## 2017-08-28 ENCOUNTER — PATIENT MESSAGE (OUTPATIENT)
Dept: PODIATRY | Facility: CLINIC | Age: 27
End: 2017-08-28

## 2017-09-01 ENCOUNTER — OFFICE VISIT (OUTPATIENT)
Dept: PODIATRY | Facility: CLINIC | Age: 27
End: 2017-09-01
Payer: COMMERCIAL

## 2017-09-01 VITALS
BODY MASS INDEX: 23.7 KG/M2 | SYSTOLIC BLOOD PRESSURE: 123 MMHG | HEART RATE: 90 BPM | HEIGHT: 69 IN | DIASTOLIC BLOOD PRESSURE: 75 MMHG | WEIGHT: 160 LBS

## 2017-09-01 DIAGNOSIS — Z98.890 POST-OPERATIVE STATE: Primary | ICD-10-CM

## 2017-09-01 PROCEDURE — 99024 POSTOP FOLLOW-UP VISIT: CPT | Mod: S$GLB,,, | Performed by: PODIATRIST

## 2017-09-01 PROCEDURE — 99999 PR PBB SHADOW E&M-EST. PATIENT-LVL III: CPT | Mod: PBBFAC,,, | Performed by: PODIATRIST

## 2017-09-08 NOTE — PROGRESS NOTES
Subjective:      Patient ID: Dong Denny is a 26 y.o. male.    Chief Complaint: Post-op Evaluation (left ft. 5th toe/PCP Dr. Goodson 8/14/17)    Dong is a 26 y.o. male who presents 1 week s/p correction of left 5th hammertoe. He is doing well and has returned to work. No pain medications needed. Wearing surgical shoe for WBAT. No new concerns.     Review of Systems   Constitution: Negative for chills, fever, weakness, malaise/fatigue and night sweats.   Cardiovascular: Negative for chest pain, leg swelling, orthopnea and palpitations.   Respiratory: Negative for cough, shortness of breath and wheezing.    Skin: Negative for color change, itching, poor wound healing and rash.   Musculoskeletal: Positive for joint pain. Negative for arthritis, gout, joint swelling, muscle weakness and myalgias.   Gastrointestinal: Negative for abdominal pain, constipation and nausea.   Neurological: Negative for disturbances in coordination, dizziness, focal weakness, numbness and tremors.           Objective:      Physical Exam   Constitutional: He is oriented to person, place, and time. Vital signs are normal. He appears well-developed. He is cooperative. No distress.   Cardiovascular: Intact distal pulses.    Pulses:       Dorsalis pedis pulses are 2+ on the right side, and 2+ on the left side.        Posterior tibial pulses are 2+ on the right side, and 2+ on the left side.   Musculoskeletal:        Right ankle: Normal.        Left ankle: Normal.        Right foot: There is normal range of motion, no bony tenderness, normal capillary refill, no crepitus and no deformity.        Left foot: There is swelling. There is normal range of motion, no bony tenderness, normal capillary refill and no crepitus.   Decreased tenderness at distal tip and dorsal PIPJ, left 5th toe. Mild edema.    Improved, near rectus, alignment of left 5th toe IP joint.        Neurological: He is alert and oriented to person, place, and time. He  has normal strength. He displays no atrophy. No sensory deficit. He exhibits normal muscle tone. Gait normal.   Reflex Scores:       Achilles reflexes are 2+ on the right side and 2+ on the left side.  Skin: Skin is intact. No ecchymosis and no lesion noted. No erythema. Nails show no clubbing.   Left 5th toe incision site well opposed. No dehiscence or signs of infection.                Assessment:       Encounter Diagnosis   Name Primary?    Post-operative state Yes         Plan:       Dong was seen today for post-op evaluation.    Diagnoses and all orders for this visit:    Post-operative state      I counseled the patient on his conditions, their implications and medical management.    Reviewed and printed xrays for patient    Sterile compression dressing applied.    WBAT with surgical shoe    ANGELLA    F/u 2 weeks for suture removal.

## 2017-09-15 ENCOUNTER — OFFICE VISIT (OUTPATIENT)
Dept: PODIATRY | Facility: CLINIC | Age: 27
End: 2017-09-15
Payer: COMMERCIAL

## 2017-09-15 VITALS
HEART RATE: 100 BPM | HEIGHT: 69 IN | SYSTOLIC BLOOD PRESSURE: 138 MMHG | BODY MASS INDEX: 23.7 KG/M2 | DIASTOLIC BLOOD PRESSURE: 80 MMHG | WEIGHT: 160 LBS

## 2017-09-15 DIAGNOSIS — M20.42 HAMMER TOE OF LEFT FOOT: ICD-10-CM

## 2017-09-15 DIAGNOSIS — Z98.890 POST-OPERATIVE STATE: Primary | ICD-10-CM

## 2017-09-15 PROCEDURE — 99024 POSTOP FOLLOW-UP VISIT: CPT | Mod: S$GLB,,, | Performed by: PODIATRIST

## 2017-09-15 PROCEDURE — 99999 PR PBB SHADOW E&M-EST. PATIENT-LVL III: CPT | Mod: PBBFAC,,, | Performed by: PODIATRIST

## 2017-09-15 NOTE — PROGRESS NOTES
Subjective:      Patient ID: Dong Denny is a 26 y.o. male.    Chief Complaint: Post-op Evaluation (left ft.) and Follow-up    Dong is a 26 y.o. male who presents 3 week s/p correction of left 5th hammertoe. He is doing well and has returned to work. No pain medications needed. Wearing loafers. No new concerns.     Review of Systems   Constitution: Negative for chills, fever, weakness, malaise/fatigue and night sweats.   Cardiovascular: Negative for chest pain, leg swelling, orthopnea and palpitations.   Respiratory: Negative for cough, shortness of breath and wheezing.    Skin: Negative for color change, itching, poor wound healing and rash.   Musculoskeletal: Negative for arthritis, gout, joint pain, joint swelling, muscle weakness and myalgias.   Gastrointestinal: Negative for abdominal pain, constipation and nausea.   Neurological: Negative for disturbances in coordination, dizziness, focal weakness, numbness and tremors.           Objective:      Physical Exam   Constitutional: He is oriented to person, place, and time. Vital signs are normal. He appears well-developed. He is cooperative. No distress.   Cardiovascular: Intact distal pulses.    Pulses:       Dorsalis pedis pulses are 2+ on the right side, and 2+ on the left side.        Posterior tibial pulses are 2+ on the right side, and 2+ on the left side.   Musculoskeletal:        Right ankle: Normal.        Left ankle: Normal.        Right foot: There is normal range of motion, no bony tenderness, normal capillary refill, no crepitus and no deformity.        Left foot: There is normal range of motion, no bony tenderness, normal capillary refill and no crepitus.   Decreased tenderness at distal tip and dorsal PIPJ, left 5th toe. Mild edema, improving.    Improved, near rectus, alignment of left 5th toe IP joint.        Neurological: He is alert and oriented to person, place, and time. He has normal strength. He displays no atrophy. No sensory  deficit. He exhibits normal muscle tone. Gait normal.   Reflex Scores:       Achilles reflexes are 2+ on the right side and 2+ on the left side.  Skin: Skin is intact. No ecchymosis and no lesion noted. No erythema. Nails show no clubbing.   Left 5th toe incision site well opposed and healed. No dehiscence or signs of infection.                Assessment:       Encounter Diagnoses   Name Primary?    Post-operative state Yes    Hammer toe of left foot          Plan:       Dong was seen today for post-op evaluation and follow-up.    Diagnoses and all orders for this visit:    Post-operative state  -     X-Ray Foot Complete Left; Future    Hammer toe of left foot  -     X-Ray Foot Complete Left; Future      I counseled the patient on his conditions, their implications and medical management.    Sutures removed.     WBAT with accommodative shoes. Slowly increase activity.    RICE as needed.    WB xray at next visit in 4-6 weeks.

## 2017-09-25 ENCOUNTER — LAB VISIT (OUTPATIENT)
Dept: LAB | Facility: HOSPITAL | Age: 27
End: 2017-09-25
Attending: INTERNAL MEDICINE
Payer: COMMERCIAL

## 2017-09-25 ENCOUNTER — OFFICE VISIT (OUTPATIENT)
Dept: INTERNAL MEDICINE | Facility: CLINIC | Age: 27
End: 2017-09-25
Payer: COMMERCIAL

## 2017-09-25 VITALS
HEART RATE: 68 BPM | DIASTOLIC BLOOD PRESSURE: 74 MMHG | WEIGHT: 164.13 LBS | BODY MASS INDEX: 24.31 KG/M2 | SYSTOLIC BLOOD PRESSURE: 108 MMHG | HEIGHT: 69 IN

## 2017-09-25 DIAGNOSIS — L65.9 ALOPECIA: ICD-10-CM

## 2017-09-25 DIAGNOSIS — R68.82 DECREASED LIBIDO: ICD-10-CM

## 2017-09-25 DIAGNOSIS — R53.83 FATIGUE, UNSPECIFIED TYPE: Primary | ICD-10-CM

## 2017-09-25 DIAGNOSIS — R53.83 FATIGUE, UNSPECIFIED TYPE: ICD-10-CM

## 2017-09-25 LAB
ALBUMIN SERPL BCP-MCNC: 4.5 G/DL
ALP SERPL-CCNC: 47 U/L
ALT SERPL W/O P-5'-P-CCNC: 19 U/L
ANION GAP SERPL CALC-SCNC: 12 MMOL/L
AST SERPL-CCNC: 14 U/L
BASOPHILS # BLD AUTO: 0.03 K/UL
BASOPHILS NFR BLD: 0.5 %
BILIRUB SERPL-MCNC: 1 MG/DL
BUN SERPL-MCNC: 15 MG/DL
CALCIUM SERPL-MCNC: 9.8 MG/DL
CHLORIDE SERPL-SCNC: 105 MMOL/L
CO2 SERPL-SCNC: 24 MMOL/L
CREAT SERPL-MCNC: 0.9 MG/DL
DIFFERENTIAL METHOD: ABNORMAL
EOSINOPHIL # BLD AUTO: 0 K/UL
EOSINOPHIL NFR BLD: 0.2 %
ERYTHROCYTE [DISTWIDTH] IN BLOOD BY AUTOMATED COUNT: 12.3 %
EST. GFR  (AFRICAN AMERICAN): >60 ML/MIN/1.73 M^2
EST. GFR  (NON AFRICAN AMERICAN): >60 ML/MIN/1.73 M^2
GLUCOSE SERPL-MCNC: 106 MG/DL
HCT VFR BLD AUTO: 45.7 %
HGB BLD-MCNC: 15.7 G/DL
LYMPHOCYTES # BLD AUTO: 0.9 K/UL
LYMPHOCYTES NFR BLD: 15 %
MCH RBC QN AUTO: 29.7 PG
MCHC RBC AUTO-ENTMCNC: 34.4 G/DL
MCV RBC AUTO: 86 FL
MONOCYTES # BLD AUTO: 0.4 K/UL
MONOCYTES NFR BLD: 7.1 %
NEUTROPHILS # BLD AUTO: 4.7 K/UL
NEUTROPHILS NFR BLD: 77 %
PLATELET # BLD AUTO: 296 K/UL
PMV BLD AUTO: 9.6 FL
POTASSIUM SERPL-SCNC: 4.2 MMOL/L
PROT SERPL-MCNC: 7.3 G/DL
RBC # BLD AUTO: 5.29 M/UL
SODIUM SERPL-SCNC: 141 MMOL/L
TSH SERPL DL<=0.005 MIU/L-ACNC: 2.14 UIU/ML
WBC # BLD AUTO: 6.06 K/UL

## 2017-09-25 PROCEDURE — 99214 OFFICE O/P EST MOD 30 MIN: CPT | Mod: S$GLB,,, | Performed by: INTERNAL MEDICINE

## 2017-09-25 PROCEDURE — 3008F BODY MASS INDEX DOCD: CPT | Mod: S$GLB,,, | Performed by: INTERNAL MEDICINE

## 2017-09-25 PROCEDURE — 99999 PR PBB SHADOW E&M-EST. PATIENT-LVL III: CPT | Mod: PBBFAC,,, | Performed by: INTERNAL MEDICINE

## 2017-09-25 PROCEDURE — 80053 COMPREHEN METABOLIC PANEL: CPT

## 2017-09-25 PROCEDURE — 84443 ASSAY THYROID STIM HORMONE: CPT

## 2017-09-25 PROCEDURE — 85025 COMPLETE CBC W/AUTO DIFF WBC: CPT

## 2017-09-25 PROCEDURE — 84270 ASSAY OF SEX HORMONE GLOBUL: CPT

## 2017-09-25 NOTE — PROGRESS NOTES
"Subjective:       Patient ID: Dong Denny is a 26 y.o. male.    Chief Complaint: low libido.    HPI PCP Dr. Goodson  In the last 1 reports low libido (decreased desire) and fatigue.   Sleeps for 8 hours. No issues w/ erections.   On melatonin, which helps w/ sleep.   Some trouble concentrating at work.    Works out about 4-5x/week. Weight lifts for about 45 min. Would've worked out for about an hour or so.     Alopecia - on finasteride unknown dose since Oct 2015. Thinking about seeing someone for hair transplant.    Review of Systems   Constitutional: Negative for activity change, chills, fever and unexpected weight change.   HENT: Positive for rhinorrhea (getting over a cold currently). Negative for congestion, hearing loss and trouble swallowing.    Eyes: Negative for discharge and visual disturbance.   Respiratory: Negative for chest tightness and wheezing.    Cardiovascular: Negative for chest pain and palpitations.   Gastrointestinal: Negative for blood in stool, constipation, diarrhea and vomiting.   Endocrine: Negative for polydipsia and polyuria.   Genitourinary: Negative for difficulty urinating, dysuria, frequency, hematuria and urgency.   Musculoskeletal: Negative for arthralgias, joint swelling and neck pain.   Neurological: Negative for weakness and headaches.   Psychiatric/Behavioral: Negative for confusion and dysphoric mood.         Objective:      Physical Exam    /74 (BP Location: Left arm, Patient Position: Sitting, BP Method: Medium (Manual))   Pulse 68   Ht 5' 9" (1.753 m)   Wt 74.5 kg (164 lb 2.1 oz)   BMI 24.24 kg/m²     GEN - A+OX4, NAD   HEENT - PERRL, EOMI, OP clear  Neck - No thyromegaly or cervical LAD. No thyroid masses felt.  CV - RRR, no m/r   Chest - CTAB, no wheezing or rhonchi  Abd - S/NT/ND/+BS.   Ext - 2+BDP and radial pulses. No C/C/E.  Neuro - 5/5 BUE and BLE strength. 2+ DTRs. PERRL, EOMI, no nystagmus, eyebrow raise, facial sensation, hearing, m of " mastication, smile, palatal raise, shoulder shrug, tongue protrusion symmetric and intact.  LN - No axillary or inguinal LAD appreciated.  Skin - No rash.    Previous labs reviewed.    Assessment/Plan     Dong was seen today for hospital follow up.    Diagnoses and all orders for this visit:    Fatigue, unspecified type  -     TSH; Future  -     CBC auto differential; Future  -     Comprehensive metabolic panel; Future    Decreased libido - discussed that finasteride (on for alopecia since 2015 and works) can cause decreased libido as it decreases conversion of testosterone to 5DHT. Consider stopping. Pt reports would like see someone for hair transplant prior to stopping.   -     TSH; Future  -     TESTOSTERONE PANEL; Future    Alopecia - as above.     Return if symptoms worsen or fail to improve.      Ina Bliss MD  Department of Internal Medicine - [facility]  7:24 AM

## 2017-09-29 LAB
ALBUMIN SERPL-MCNC: 4.9 G/DL (ref 3.6–5.1)
SHBG SERPL-SCNC: 8 NMOL/L (ref 10–50)
TESTOST FREE SERPL-MCNC: 179.7 PG/ML (ref 46–224)
TESTOST SERPL-MCNC: 578 NG/DL (ref 250–1100)
TESTOSTERONE.FREE+WB SERPL-MCNC: 400.9 NG/DL (ref 110–575)

## 2018-08-30 DIAGNOSIS — Z00.00 ROUTINE GENERAL MEDICAL EXAMINATION AT A HEALTH CARE FACILITY: Primary | ICD-10-CM

## 2018-09-12 ENCOUNTER — CLINICAL SUPPORT (OUTPATIENT)
Dept: INTERNAL MEDICINE | Facility: CLINIC | Age: 28
End: 2018-09-12
Payer: COMMERCIAL

## 2018-09-12 ENCOUNTER — OFFICE VISIT (OUTPATIENT)
Dept: INTERNAL MEDICINE | Facility: CLINIC | Age: 28
End: 2018-09-12
Payer: COMMERCIAL

## 2018-09-12 ENCOUNTER — HOSPITAL ENCOUNTER (OUTPATIENT)
Dept: CARDIOLOGY | Facility: CLINIC | Age: 28
Discharge: HOME | End: 2018-09-12
Payer: COMMERCIAL

## 2018-09-12 VITALS
WEIGHT: 151.69 LBS | SYSTOLIC BLOOD PRESSURE: 112 MMHG | BODY MASS INDEX: 22.47 KG/M2 | OXYGEN SATURATION: 99 % | DIASTOLIC BLOOD PRESSURE: 74 MMHG | HEART RATE: 75 BPM | HEIGHT: 69 IN

## 2018-09-12 DIAGNOSIS — L64.9 MALE PATTERN ALOPECIA: ICD-10-CM

## 2018-09-12 DIAGNOSIS — Z00.00 ROUTINE GENERAL MEDICAL EXAMINATION AT A HEALTH CARE FACILITY: ICD-10-CM

## 2018-09-12 DIAGNOSIS — Z00.00 ROUTINE GENERAL MEDICAL EXAMINATION AT A HEALTH CARE FACILITY: Primary | ICD-10-CM

## 2018-09-12 DIAGNOSIS — Z00.00 VISIT FOR ANNUAL HEALTH EXAMINATION: Primary | ICD-10-CM

## 2018-09-12 LAB
ALBUMIN SERPL BCP-MCNC: 4.6 G/DL
ALP SERPL-CCNC: 47 U/L
ALT SERPL W/O P-5'-P-CCNC: 26 U/L
ANION GAP SERPL CALC-SCNC: 8 MMOL/L
AST SERPL-CCNC: 20 U/L
BILIRUB SERPL-MCNC: 0.6 MG/DL
BUN SERPL-MCNC: 11 MG/DL
CALCIUM SERPL-MCNC: 10.3 MG/DL
CHLORIDE SERPL-SCNC: 106 MMOL/L
CHOLEST SERPL-MCNC: 144 MG/DL
CHOLEST/HDLC SERPL: 4.4 {RATIO}
CO2 SERPL-SCNC: 30 MMOL/L
CREAT SERPL-MCNC: 0.9 MG/DL
ERYTHROCYTE [DISTWIDTH] IN BLOOD BY AUTOMATED COUNT: 11.9 %
EST. GFR  (AFRICAN AMERICAN): >60 ML/MIN/1.73 M^2
EST. GFR  (NON AFRICAN AMERICAN): >60 ML/MIN/1.73 M^2
ESTIMATED AVG GLUCOSE: 97 MG/DL
GLUCOSE SERPL-MCNC: 99 MG/DL
HBA1C MFR BLD HPLC: 5 %
HCT VFR BLD AUTO: 47.2 %
HDLC SERPL-MCNC: 33 MG/DL
HDLC SERPL: 22.9 %
HGB BLD-MCNC: 15.3 G/DL
LDLC SERPL CALC-MCNC: 95.8 MG/DL
MCH RBC QN AUTO: 29.8 PG
MCHC RBC AUTO-ENTMCNC: 32.4 G/DL
MCV RBC AUTO: 92 FL
NONHDLC SERPL-MCNC: 111 MG/DL
PLATELET # BLD AUTO: 266 K/UL
PMV BLD AUTO: 9.5 FL
POTASSIUM SERPL-SCNC: 5 MMOL/L
PROT SERPL-MCNC: 7.3 G/DL
RBC # BLD AUTO: 5.13 M/UL
SODIUM SERPL-SCNC: 144 MMOL/L
TRIGL SERPL-MCNC: 76 MG/DL
TSH SERPL DL<=0.005 MIU/L-ACNC: 1.74 UIU/ML
WBC # BLD AUTO: 3.39 K/UL

## 2018-09-12 PROCEDURE — 80053 COMPREHEN METABOLIC PANEL: CPT

## 2018-09-12 PROCEDURE — 93000 ELECTROCARDIOGRAM COMPLETE: CPT | Mod: S$GLB,,, | Performed by: INTERNAL MEDICINE

## 2018-09-12 PROCEDURE — 99999 PR PBB SHADOW E&M-EST. PATIENT-LVL III: CPT | Mod: PBBFAC,,, | Performed by: INTERNAL MEDICINE

## 2018-09-12 PROCEDURE — 83036 HEMOGLOBIN GLYCOSYLATED A1C: CPT

## 2018-09-12 PROCEDURE — 36415 COLL VENOUS BLD VENIPUNCTURE: CPT

## 2018-09-12 PROCEDURE — 84443 ASSAY THYROID STIM HORMONE: CPT

## 2018-09-12 PROCEDURE — 80061 LIPID PANEL: CPT

## 2018-09-12 PROCEDURE — 85027 COMPLETE CBC AUTOMATED: CPT

## 2018-09-12 PROCEDURE — 99385 PREV VISIT NEW AGE 18-39: CPT | Mod: S$GLB,,, | Performed by: INTERNAL MEDICINE

## 2018-09-12 RX ORDER — FINASTERIDE 1 MG/1
1 TABLET, FILM COATED ORAL EVERY MORNING
Qty: 90 TABLET | Refills: 3 | Status: SHIPPED | OUTPATIENT
Start: 2018-09-12 | End: 2018-12-27 | Stop reason: SDUPTHER

## 2018-09-12 NOTE — LETTER
"September 12, 2018    Dong Denny  [address] [facility] [place] [address]             [facility] - Internal Medicine  [address]  [place] [address]  Phone: [phone]  Fax: [phone] Dear Mr. Denny:    Thank you for allowing me to serve you and perform your Executive Health exam on 9/12/2018.  This letter will serve a brief summary of the history, physical findings, and laboratory/studies performed and recommendations at that time.    Reason for Visit: Executive Health Preventive Physical Examination    /74 (BP Location: Right arm, Patient Position: Sitting, BP Method: Medium (Manual))   Pulse 75   Ht 5' 9" (1.753 m)   Wt 68.8 kg (151 lb 10.8 oz)   SpO2 99%   BMI 22.40 kg/m²     Labs:  Results for orders placed or performed in visit on 09/12/18   Comprehensive metabolic panel   Result Value Ref Range    Sodium 144 136 - 145 mmol/L    Potassium 5.0 3.5 - 5.1 mmol/L    Chloride 106 95 - 110 mmol/L    CO2 30 (H) 23 - 29 mmol/L    Glucose 99 70 - 110 mg/dL    BUN, Bld 11 6 - 20 mg/dL    Creatinine 0.9 0.5 - 1.4 mg/dL    Calcium 10.3 8.7 - 10.5 mg/dL    Total Protein 7.3 6.0 - 8.4 g/dL    Albumin 4.6 3.5 - 5.2 g/dL    Total Bilirubin 0.6 0.1 - 1.0 mg/dL    Alkaline Phosphatase 47 (L) 55 - 135 U/L    AST 20 10 - 40 U/L    ALT 26 10 - 44 U/L    Anion Gap 8 8 - 16 mmol/L    eGFR if African American >60.0 >60 mL/min/1.73 m^2    eGFR if non African American >60.0 >60 mL/min/1.73 m^2   CBC Without Differential   Result Value Ref Range    WBC 3.39 (L) 3.90 - 12.70 K/uL    RBC 5.13 4.60 - 6.20 M/uL    Hemoglobin 15.3 14.0 - 18.0 g/dL    Hematocrit 47.2 40.0 - 54.0 %    MCV 92 82 - 98 fL    MCH 29.8 27.0 - 31.0 pg    MCHC 32.4 32.0 - 36.0 g/dL    RDW 11.9 11.5 - 14.5 %    Platelets 266 150 - 350 K/uL    MPV 9.5 9.2 - 12.9 fL   Lipid panel   Result Value Ref Range    Cholesterol 144 120 - 199 mg/dL    Triglycerides 76 30 - 150 mg/dL    HDL 33 (L) 40 - 75 mg/dL    LDL Cholesterol 95.8 " 63.0 - 159.0 mg/dL    HDL/Chol Ratio 22.9 20.0 - 50.0 %    Total Cholesterol/HDL Ratio 4.4 2.0 - 5.0    Non-HDL Cholesterol 111 mg/dL   Hemoglobin A1c   Result Value Ref Range    Hemoglobin A1C 5.0 4.0 - 5.6 %    Estimated Avg Glucose 97 68 - 131 mg/dL   TSH   Result Value Ref Range    TSH 1.738 0.400 - 4.000 uIU/mL        Assessment/Recommendations:  Routine Health Maintenance  Flu vaccination due, recommend getting via employer once available.  Tdap vaccination up-to-date 9/2016    At this time, you appear to be in good medical condition.  Your labs and EKG are normal overall.  Your good cholesterol could be optimized with cardiac exercise 5 days a week as we discussed in clinic today.  You have slightly enlarged lymph nodes on exam.  Please notify me if these increase in size but are likely due to past or recent infection.    I look forward to seeing you again next year.  Please contact me should you have any questions or concerns regarding physical findings, or my recommendations.    If you have any questions or concerns, please don't hesitate to call.    Sincerely,        Ashley Arriaza MD

## 2018-09-12 NOTE — PROGRESS NOTES
INTERNAL MEDICINE INITIAL VISIT NOTE      CHIEF COMPLAINT     Chief Complaint   Patient presents with    [facility]       HPI     Dong Denny is a 27 y.o.  male who presents with hx low back pain c R sided sciatica, male pattern hair loss, hx kidney stones in the past, here today for [facility] Physical and to establish care.    Feeling well overall.  Says he pulled a muscle in his lower back last week c sx radiating down his RLE but says he took Ibuprofen and did stretching exercises and sx have improved.      Past Medical History:  Past Medical History:   Diagnosis Date    Back pain with sciatica     Kidney stones        Past Surgical History:  Past Surgical History:   Procedure Laterality Date    REPAIR-HAMMER TOE Left 8/24/2017    Performed by Vern [facility]or DPM at [facility] OR [facility]ST FLR    TENOTOMY-FLEXOR Left 8/24/2017    Performed by Vern Umanzor DPM at [facility] OR 1ST FLR    TONSILLECTOMY      TURBINATE RESECTION Bilateral 2010, 2006    done twice    TYMPANOSTOMY TUBE PLACEMENT Bilateral 1990       Home Medications:  Prior to Admission medications    Medication Sig Start Date End Date Taking? Authorizing Provider   finasteride (PROPECIA) 1 mg tablet Take 1 mg by mouth every morning.  8/31/16  Yes Historical Provider, MD       Allergies:  Review of patient's allergies indicates:  No Known Allergies    Family History:  Family History   Problem Relation Age of Onset    No Known Problems Mother     Diabetes Father        Social History:  Social History     Tobacco Use    Smoking status: Never Smoker    Smokeless tobacco: Never Used   Substance Use Topics    Alcohol use: Yes     Comment: socially    Drug use: No       Review of Systems:  Review of Systems   Constitutional: Negative for appetite change, chills, fatigue, fever and unexpected weight change.   HENT: Negative for congestion, hearing loss and rhinorrhea.    Eyes: Negative for pain and visual disturbance.  "  Respiratory: Negative for cough, chest tightness, shortness of breath and wheezing.    Cardiovascular: Negative for chest pain, palpitations and leg swelling.   Gastrointestinal: Negative for abdominal distention and abdominal pain.   Endocrine: Negative for polydipsia and polyuria.   Genitourinary: Negative for decreased urine volume, discharge, dysuria and frequency.   Neurological: Negative for dizziness, weakness, numbness and headaches.   Psychiatric/Behavioral: Negative for behavioral problems and confusion.       Health Maintenance:   Immunizations:   Influenza needed, pt to get through work which he does annually  TDap 9/2016    Cancer Screening:  Colonoscopy: rec at 48    PHYSICAL EXAM     /74 (BP Location: Right arm, Patient Position: Sitting, BP Method: Medium (Manual))   Pulse 75   Ht 5' 9" (1.753 m)   Wt 68.8 kg (151 lb 10.8 oz)   SpO2 99%   BMI 22.40 kg/m²     GEN - A+OX4, NAD   HEENT - PERRL, EOMI, OP clear  Neck - No thyromegaly. No thyroid masses felt.  +post cervical LAD noted, small and mobile, 2 LN on L lower part of neck, one on R lower.  CV - RRR, no m/r/g  Chest - CTAB, no wheezing, crackles, or rhonchi  Abd - S/NT/ND/+BS.   Ext - 2+BDP and radial pulses. No C/C/E.  Skin - Normal color and texture, no rash, no skin lesions.      LABS     Lab Results   Component Value Date    WBC 3.39 (L) 09/12/2018    HGB 15.3 09/12/2018    HCT 47.2 09/12/2018    MCV 92 09/12/2018     09/12/2018     CMP  Sodium   Date Value Ref Range Status   09/12/2018 144 136 - 145 mmol/L Final     Potassium   Date Value Ref Range Status   09/12/2018 5.0 3.5 - 5.1 mmol/L Final     Chloride   Date Value Ref Range Status   09/12/2018 106 95 - 110 mmol/L Final     CO2   Date Value Ref Range Status   09/12/2018 30 (H) 23 - 29 mmol/L Final     Glucose   Date Value Ref Range Status   09/12/2018 99 70 - 110 mg/dL Final     BUN, Bld   Date Value Ref Range Status   09/12/2018 11 6 - 20 mg/dL Final     Creatinine "   Date Value Ref Range Status   09/12/2018 0.9 0.5 - 1.4 mg/dL Final     Calcium   Date Value Ref Range Status   09/12/2018 10.3 8.7 - 10.5 mg/dL Final     Total Protein   Date Value Ref Range Status   09/12/2018 7.3 6.0 - 8.4 g/dL Final     Albumin   Date Value Ref Range Status   09/12/2018 4.6 3.5 - 5.2 g/dL Final   09/25/2017 4.9 3.6 - 5.1 g/dL Final     Comment:     @ Test Performed By:  [facility] [place]  Murali Garner M.D., SIMRAN.,   [address] [place] [id number]  [facility]  [id number]       Total Bilirubin   Date Value Ref Range Status   09/12/2018 0.6 0.1 - 1.0 mg/dL Final     Comment:     For infants and newborns, interpretation of results should be based  on gestational age, weight and in agreement with clinical  observations.  Premature Infant recommended reference ranges:  Up to 24 hours.............<8.0 mg/dL  Up to 48 hours............<12.0 mg/dL  3-5 days..................<15.0 mg/dL  6-29 days.................<15.0 mg/dL       Alkaline Phosphatase   Date Value Ref Range Status   09/12/2018 47 (L) 55 - 135 U/L Final     AST   Date Value Ref Range Status   09/12/2018 20 10 - 40 U/L Final     ALT   Date Value Ref Range Status   09/12/2018 26 10 - 44 U/L Final     Anion Gap   Date Value Ref Range Status   09/12/2018 8 8 - 16 mmol/L Final     eGFR if    Date Value Ref Range Status   09/12/2018 >60.0 >60 mL/min/1.73 m^2 Final     eGFR if non    Date Value Ref Range Status   09/12/2018 >60.0 >60 mL/min/1.73 m^2 Final     Comment:     Calculation used to obtain the estimated glomerular filtration  rate (eGFR) is the CKD-EPI equation.        Lab Results   Component Value Date    [id number] Negative 06/19/2017     Lab Results   Component Value Date    LDLCALC 95.8 09/12/2018     Lab Results   Component Value Date    HGBA1C 5.0 09/12/2018         ASSESSMENT/PLAN     Dong Denny is a 27 y.o. male with  Dong was seen today  for [employer] [facility].    Diagnoses and all orders for this visit:    Visit for annual health examination  History and physical exam completed.  Health maintenance reviewed as above.  Lab results reviewed c pt.  All normal x HDL slightly low, rec increasing exercise.  WBC marginally low but close to prev baseline (fluctuating) and neg HIV last year c no new sexual partner since, low risk.  EKG reviewed c NSR.    Cervical LAD  Noted on exam.  HIV last year neg.  Wbc marginally low but cbc normal otherwise.   No B sx.  Pt advised to notify me if LN increase in size.    Male pattern alopecia  Stable on propecia  If refill needed to medineering, pt to let me know; otherwise will fill at [facility].  -     finasteride (PROPECIA) 1 mg tablet; Take 1 tablet (1 mg total) by mouth every morning.      HM as above    RTC in 12 months for next annual, sooner if needed.    Ashley Arriaza MD  Department of Internal Medicine - [facility]  09/12/2018

## 2018-12-27 ENCOUNTER — PATIENT MESSAGE (OUTPATIENT)
Dept: INTERNAL MEDICINE | Facility: CLINIC | Age: 28
End: 2018-12-27

## 2018-12-27 DIAGNOSIS — L64.9 MALE PATTERN ALOPECIA: ICD-10-CM

## 2018-12-27 RX ORDER — FINASTERIDE 1 MG/1
1 TABLET, FILM COATED ORAL EVERY MORNING
Qty: 90 TABLET | Refills: 0 | Status: SHIPPED | OUTPATIENT
Start: 2018-12-27 | End: 2020-07-09

## 2019-07-22 DIAGNOSIS — Z00.00 ROUTINE GENERAL MEDICAL EXAMINATION AT A HEALTH CARE FACILITY: Primary | ICD-10-CM

## 2019-08-26 ENCOUNTER — CLINICAL SUPPORT (OUTPATIENT)
Dept: INTERNAL MEDICINE | Facility: CLINIC | Age: 29
End: 2019-08-26

## 2019-08-26 ENCOUNTER — OFFICE VISIT (OUTPATIENT)
Dept: INTERNAL MEDICINE | Facility: CLINIC | Age: 29
End: 2019-08-26

## 2019-08-26 ENCOUNTER — HOSPITAL ENCOUNTER (OUTPATIENT)
Dept: CARDIOLOGY | Facility: CLINIC | Age: 29
Discharge: HOME | End: 2019-08-26

## 2019-08-26 VITALS
WEIGHT: 166.25 LBS | HEIGHT: 69 IN | HEART RATE: 59 BPM | OXYGEN SATURATION: 99 % | DIASTOLIC BLOOD PRESSURE: 80 MMHG | SYSTOLIC BLOOD PRESSURE: 118 MMHG | BODY MASS INDEX: 24.62 KG/M2

## 2019-08-26 DIAGNOSIS — Z00.00 ROUTINE GENERAL MEDICAL EXAMINATION AT A HEALTH CARE FACILITY: ICD-10-CM

## 2019-08-26 DIAGNOSIS — Z00.00 ROUTINE GENERAL MEDICAL EXAMINATION AT A HEALTH CARE FACILITY: Primary | ICD-10-CM

## 2019-08-26 DIAGNOSIS — Z00.00 VISIT FOR ANNUAL HEALTH EXAMINATION: Primary | ICD-10-CM

## 2019-08-26 DIAGNOSIS — J31.0 CHRONIC RHINITIS: ICD-10-CM

## 2019-08-26 LAB
ALBUMIN SERPL BCP-MCNC: 4.5 G/DL (ref 3.5–5.2)
ALP SERPL-CCNC: 43 U/L (ref 55–135)
ALT SERPL W/O P-5'-P-CCNC: 24 U/L (ref 10–44)
ANION GAP SERPL CALC-SCNC: 9 MMOL/L (ref 8–16)
AST SERPL-CCNC: 14 U/L (ref 10–40)
BILIRUB SERPL-MCNC: 0.4 MG/DL (ref 0.1–1)
BUN SERPL-MCNC: 10 MG/DL (ref 6–20)
CALCIUM SERPL-MCNC: 9.5 MG/DL (ref 8.7–10.5)
CHLORIDE SERPL-SCNC: 106 MMOL/L (ref 95–110)
CHOLEST SERPL-MCNC: 150 MG/DL (ref 120–199)
CHOLEST/HDLC SERPL: 3.8 {RATIO} (ref 2–5)
CO2 SERPL-SCNC: 27 MMOL/L (ref 23–29)
CREAT SERPL-MCNC: 0.9 MG/DL (ref 0.5–1.4)
ERYTHROCYTE [DISTWIDTH] IN BLOOD BY AUTOMATED COUNT: 12.2 % (ref 11.5–14.5)
EST. GFR  (AFRICAN AMERICAN): >60 ML/MIN/1.73 M^2
EST. GFR  (NON AFRICAN AMERICAN): >60 ML/MIN/1.73 M^2
ESTIMATED AVG GLUCOSE: 94 MG/DL (ref 68–131)
GLUCOSE SERPL-MCNC: 98 MG/DL (ref 70–110)
HBA1C MFR BLD HPLC: 4.9 % (ref 4–5.6)
HCT VFR BLD AUTO: 46.4 % (ref 40–54)
HDLC SERPL-MCNC: 40 MG/DL (ref 40–75)
HDLC SERPL: 26.7 % (ref 20–50)
HGB BLD-MCNC: 15.2 G/DL (ref 14–18)
LDLC SERPL CALC-MCNC: 89 MG/DL (ref 63–159)
MCH RBC QN AUTO: 29.8 PG (ref 27–31)
MCHC RBC AUTO-ENTMCNC: 32.8 G/DL (ref 32–36)
MCV RBC AUTO: 91 FL (ref 82–98)
NONHDLC SERPL-MCNC: 110 MG/DL
PLATELET # BLD AUTO: 271 K/UL (ref 150–350)
PMV BLD AUTO: 9.5 FL (ref 9.2–12.9)
POTASSIUM SERPL-SCNC: 4.6 MMOL/L (ref 3.5–5.1)
PROT SERPL-MCNC: 7.1 G/DL (ref 6–8.4)
RBC # BLD AUTO: 5.1 M/UL (ref 4.6–6.2)
SODIUM SERPL-SCNC: 142 MMOL/L (ref 136–145)
TRIGL SERPL-MCNC: 105 MG/DL (ref 30–150)
WBC # BLD AUTO: 4.2 K/UL (ref 3.9–12.7)

## 2019-08-26 PROCEDURE — 80053 COMPREHEN METABOLIC PANEL: CPT

## 2019-08-26 PROCEDURE — 83036 HEMOGLOBIN GLYCOSYLATED A1C: CPT

## 2019-08-26 PROCEDURE — 93010 EKG 12-LEAD: ICD-10-PCS | Mod: S$GLB,,, | Performed by: INTERNAL MEDICINE

## 2019-08-26 PROCEDURE — 93005 ELECTROCARDIOGRAM TRACING: CPT | Mod: S$GLB,,, | Performed by: INTERNAL MEDICINE

## 2019-08-26 PROCEDURE — 80061 LIPID PANEL: CPT

## 2019-08-26 PROCEDURE — 93010 ELECTROCARDIOGRAM REPORT: CPT | Mod: S$GLB,,, | Performed by: INTERNAL MEDICINE

## 2019-08-26 PROCEDURE — 99385 PR PREVENTIVE VISIT,NEW,18-39: ICD-10-PCS | Mod: S$GLB,,, | Performed by: INTERNAL MEDICINE

## 2019-08-26 PROCEDURE — 99385 PREV VISIT NEW AGE 18-39: CPT | Mod: S$GLB,,, | Performed by: INTERNAL MEDICINE

## 2019-08-26 PROCEDURE — 99999 PR PBB SHADOW E&M-EST. PATIENT-LVL III: ICD-10-PCS | Mod: PBBFAC,,, | Performed by: INTERNAL MEDICINE

## 2019-08-26 PROCEDURE — 36415 COLL VENOUS BLD VENIPUNCTURE: CPT

## 2019-08-26 PROCEDURE — 93005 EKG 12-LEAD: ICD-10-PCS | Mod: S$GLB,,, | Performed by: INTERNAL MEDICINE

## 2019-08-26 PROCEDURE — 84443 ASSAY THYROID STIM HORMONE: CPT

## 2019-08-26 PROCEDURE — 85027 COMPLETE CBC AUTOMATED: CPT

## 2019-08-26 PROCEDURE — 99999 PR PBB SHADOW E&M-EST. PATIENT-LVL III: CPT | Mod: PBBFAC,,, | Performed by: INTERNAL MEDICINE

## 2019-08-26 RX ORDER — MONTELUKAST SODIUM 10 MG/1
10 TABLET ORAL NIGHTLY
Qty: 30 TABLET | Refills: 11 | Status: SHIPPED | OUTPATIENT
Start: 2019-08-26 | End: 2019-09-25

## 2019-08-26 RX ORDER — FLUTICASONE PROPIONATE 50 MCG
1 SPRAY, SUSPENSION (ML) NASAL DAILY
Qty: 16 G | Refills: 6 | Status: SHIPPED | OUTPATIENT
Start: 2019-08-26

## 2019-08-26 NOTE — LETTER
"August 27, 2019    Dong Denny  [address] [facility] [place] [address]             [facility] - Internal Medicine  [address]  [place] [address]  Phone: [phone]  Fax: [phone] Dear Mr. Denny:    Thank you for allowing me to serve you and perform your Executive Health exam on 8/26/2019.  This letter will serve a brief summary of the laboratory/studies performed and recommendations at that time.    Reason for Visit: Executive Health Preventive Physical Examination    /80 (BP Location: Left arm, Patient Position: Sitting, BP Method: Medium (Manual))   Pulse (!) 59   Ht 5' 9" (1.753 m)   Wt 75.4 kg (166 lb 3.6 oz)   SpO2 99%   BMI 24.55 kg/m²   Labs:  Results for orders placed or performed in visit on 08/26/19   Comprehensive metabolic panel   Result Value Ref Range    Sodium 142 136 - 145 mmol/L    Potassium 4.6 3.5 - 5.1 mmol/L    Chloride 106 95 - 110 mmol/L    CO2 27 23 - 29 mmol/L    Glucose 98 70 - 110 mg/dL    BUN, Bld 10 6 - 20 mg/dL    Creatinine 0.9 0.5 - 1.4 mg/dL    Calcium 9.5 8.7 - 10.5 mg/dL    Total Protein 7.1 6.0 - 8.4 g/dL    Albumin 4.5 3.5 - 5.2 g/dL    Total Bilirubin 0.4 0.1 - 1.0 mg/dL    Alkaline Phosphatase 43 (L) 55 - 135 U/L    AST 14 10 - 40 U/L    ALT 24 10 - 44 U/L    Anion Gap 9 8 - 16 mmol/L    eGFR if African American >60.0 >60 mL/min/1.73 m^2    eGFR if non African American >60.0 >60 mL/min/1.73 m^2   CBC Without Differential   Result Value Ref Range    WBC 4.20 3.90 - 12.70 K/uL    RBC 5.10 4.60 - 6.20 M/uL    Hemoglobin 15.2 14.0 - 18.0 g/dL    Hematocrit 46.4 40.0 - 54.0 %    Mean Corpuscular Volume 91 82 - 98 fL    Mean Corpuscular Hemoglobin 29.8 27.0 - 31.0 pg    Mean Corpuscular Hemoglobin Conc 32.8 32.0 - 36.0 g/dL    RDW 12.2 11.5 - 14.5 %    Platelets 271 150 - 350 K/uL    MPV 9.5 9.2 - 12.9 fL   Lipid panel   Result Value Ref Range    Cholesterol 150 120 - 199 mg/dL    Triglycerides 105 30 - 150 mg/dL    HDL 40 40 - 75 mg/dL "    LDL Cholesterol 89.0 63.0 - 159.0 mg/dL    Hdl/Cholesterol Ratio 26.7 20.0 - 50.0 %    Total Cholesterol/HDL Ratio 3.8 2.0 - 5.0    Non-HDL Cholesterol 110 mg/dL   Hemoglobin A1c   Result Value Ref Range    Hemoglobin A1C 4.9 4.0 - 5.6 %    Estimated Avg Glucose 94 68 - 131 mg/dL   TSH   Result Value Ref Range    TSH 1.259 0.400 - 4.000 uIU/mL      EKG with sinus bradycardia, which is acceptable for your age.    Assessment/Recommendations:  Routine Health Maintenance  Flu vaccination is recommended this Fall  Tdap vaccination is up-to-date 9/2016    At this time, you appear to be in good medical condition.  I look forward to seeing you again next year.  Please contact me should you have any questions or concerns regarding physical findings, or my recommendations.    If you have any questions or concerns, please don't hesitate to call.    Sincerely,        Ashley rAriaza MD

## 2019-08-26 NOTE — PROGRESS NOTES
INTERNAL MEDICINE ESTABLISHED PATIENT VISIT NOTE    Subjective:     Chief Complaint: Executive Health       Patient ID: Dong Denny is a 28 y.o. 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    Had issues his back last Fall requiring urgent care visit associated c squats so now doing more machine work and has not had recent issues c his back.    Does note some issues c chronic rhinitis.  Has been taking claritin and  otc flonase which helps a little but feels sx not optimized.  Took singulair back in high school, cannot remember if it worked or not.  Had mult turbinectomies in the past which he feels only helped temporarily and then sx would recur.  Previous ENT in [place].    Past Medical History:  Past Medical History:   Diagnosis Date    Back pain with sciatica     Kidney stones        Home Medications:  Prior to Admission medications    Medication Sig Start Date End Date Taking? Authorizing Provider   finasteride (PROPECIA) 1 mg tablet Take 1 tablet (1 mg total) by mouth every morning. 12/27/18  Yes Niharika Schaefer MD       Allergies:  Review of patient's allergies indicates:  No Known Allergies    Social History:  Social History     Tobacco Use    Smoking status: Never Smoker    Smokeless tobacco: Never Used   Substance Use Topics    Alcohol use: Yes     Comment: socially    Drug use: No        Review of Systems   Constitutional: Negative for appetite change, chills, fatigue, fever and unexpected weight change.   HENT: Positive for congestion, postnasal drip, rhinorrhea and sinus pressure. Negative for hearing loss and sinus pain.    Eyes: Negative for pain and visual disturbance.   Respiratory: Negative for cough, chest tightness, shortness of breath and wheezing.    Cardiovascular: Negative for chest pain, palpitations and leg swelling.   Gastrointestinal:  "Negative for abdominal distention and abdominal pain.   Endocrine: Negative for polydipsia and polyuria.   Genitourinary: Negative for decreased urine volume, discharge, dysuria and frequency.   Neurological: Negative for dizziness, weakness, numbness and headaches.   Psychiatric/Behavioral: Negative for behavioral problems and confusion.         Health Maintenance:     Immunizations:   Influenza needed, pt to get through work which he does annually  TDap 9/2016     Cancer Screening:  Colonoscopy: rec at 48    Objective:   /80 (BP Location: Left arm, Patient Position: Sitting, BP Method: Medium (Manual))   Pulse (!) 59   Ht 5' 9" (1.753 m)   Wt 75.4 kg (166 lb 3.6 oz)   SpO2 99%   BMI 24.55 kg/m²        General: AAO x3, no apparent distress  HEENT: PERRL, OP clear, scarring on B TM c small amount of serous exudate behind TM B, no erythema.  Small mobile ant cervical LN noted B which pt says is chronic and has not increased in size.  CV: RRR, no m/r/g  Pulm: Lungs CTAB, no crackles, no wheezes  Abd: s/NT/ND +BS  Extremities: no c/c/e    Labs:     Lab Results   Component Value Date    WBC 4.20 08/26/2019    HGB 15.2 08/26/2019    HCT 46.4 08/26/2019    MCV 91 08/26/2019     08/26/2019     Sodium   Date Value Ref Range Status   09/12/2018 144 136 - 145 mmol/L Final     Potassium   Date Value Ref Range Status   09/12/2018 5.0 3.5 - 5.1 mmol/L Final     Chloride   Date Value Ref Range Status   09/12/2018 106 95 - 110 mmol/L Final     CO2   Date Value Ref Range Status   09/12/2018 30 (H) 23 - 29 mmol/L Final     Glucose   Date Value Ref Range Status   09/12/2018 99 70 - 110 mg/dL Final     BUN, Bld   Date Value Ref Range Status   09/12/2018 11 6 - 20 mg/dL Final     Creatinine   Date Value Ref Range Status   09/12/2018 0.9 0.5 - 1.4 mg/dL Final     Calcium   Date Value Ref Range Status   09/12/2018 10.3 8.7 - 10.5 mg/dL Final     Total Protein   Date Value Ref Range Status   09/12/2018 7.3 6.0 - 8.4 g/dL " Final     Albumin   Date Value Ref Range Status   09/12/2018 4.6 3.5 - 5.2 g/dL Final   09/25/2017 4.9 3.6 - 5.1 g/dL Final     Comment:     @ Test Performed By:  [facility] KENYON Rome M.D.,   [address] [place] [address]  [place]  [id number]       Total Bilirubin   Date Value Ref Range Status   09/12/2018 0.6 0.1 - 1.0 mg/dL Final     Comment:     For infants and newborns, interpretation of results should be based  on gestational age, weight and in agreement with clinical  observations.  Premature Infant recommended reference ranges:  Up to 24 hours.............<8.0 mg/dL  Up to 48 hours............<12.0 mg/dL  3-5 days..................<15.0 mg/dL  6-29 days.................<15.0 mg/dL       Alkaline Phosphatase   Date Value Ref Range Status   09/12/2018 47 (L) 55 - 135 U/L Final     AST   Date Value Ref Range Status   09/12/2018 20 10 - 40 U/L Final     ALT   Date Value Ref Range Status   09/12/2018 26 10 - 44 U/L Final     Anion Gap   Date Value Ref Range Status   09/12/2018 8 8 - 16 mmol/L Final     eGFR if    Date Value Ref Range Status   09/12/2018 >60.0 >60 mL/min/1.73 m^2 Final     eGFR if non    Date Value Ref Range Status   09/12/2018 >60.0 >60 mL/min/1.73 m^2 Final     Comment:     Calculation used to obtain the estimated glomerular filtration  rate (eGFR) is the CKD-EPI equation.        Lab Results   Component Value Date    HGBA1C 5.0 09/12/2018     Lab Results   Component Value Date    LDLCALC 95.8 09/12/2018     Lab Results   Component Value Date    TSH 1.738 09/12/2018     Labs today all still pending x normal CBC    Assessment/Plan     Dong was seen today for [facility].    Diagnoses and all orders for this visit:    Visit for annual health examination  History and physical exam completed.  Health maintenance reviewed as above.  Will call c pending lab results.  EKG c sinus bradycardia (rate  close to normal and acceptable)    Chronic rhinitis  As per HPI  Will try to resume singulair to optimize sx control  Declined ENT referral at this time.  Cont flonase and claritin  -     montelukast (SINGULAIR) 10 mg tablet; Take 1 tablet (10 mg total) by mouth every evening.  -     fluticasone propionate (FLONASE) 50 mcg/actuation nasal spray; 1 spray (50 mcg total) by Each Nostril route once daily.    Cervical LAD  Unsure if related to allergy issues  No B sx  WBC wnl  Will monitor, pt to let me know if increase in size.  Sexually active in a monogamous relationship.    HM as above  RTC in 1 year, sooner if needed.    Ashley Arriaza MD  Department of Internal Medicine - [facility]  08/26/2019

## 2019-08-27 LAB — TSH SERPL DL<=0.005 MIU/L-ACNC: 1.26 UIU/ML (ref 0.4–4)

## 2019-08-28 ENCOUNTER — PATIENT MESSAGE (OUTPATIENT)
Dept: INTERNAL MEDICINE | Facility: CLINIC | Age: 29
End: 2019-08-28

## 2019-11-21 ENCOUNTER — PATIENT MESSAGE (OUTPATIENT)
Dept: INTERNAL MEDICINE | Facility: CLINIC | Age: 29
End: 2019-11-21

## 2019-11-21 DIAGNOSIS — J31.0 CHRONIC RHINITIS: Primary | ICD-10-CM

## 2019-11-21 RX ORDER — MONTELUKAST SODIUM 10 MG/1
10 TABLET ORAL DAILY
Qty: 90 TABLET | Refills: 3 | Status: SHIPPED | OUTPATIENT
Start: 2019-11-21 | End: 2021-01-06

## 2019-11-21 RX ORDER — MONTELUKAST SODIUM 10 MG/1
TABLET ORAL
COMMUNITY
Start: 2019-11-21 | End: 2019-11-21 | Stop reason: SDUPTHER

## 2020-06-15 NOTE — PLAN OF CARE
Discharge instructions given, patient verbalized understanding. Consents in chart, Vitals stable, no complaints. Prescription given to patient   no

## 2020-06-24 DIAGNOSIS — Z00.00 ROUTINE GENERAL MEDICAL EXAMINATION AT A HEALTH CARE FACILITY: Primary | ICD-10-CM

## 2020-07-09 ENCOUNTER — CLINICAL SUPPORT (OUTPATIENT)
Dept: INTERNAL MEDICINE | Facility: CLINIC | Age: 30
End: 2020-07-09

## 2020-07-09 ENCOUNTER — HOSPITAL ENCOUNTER (OUTPATIENT)
Dept: CARDIOLOGY | Facility: CLINIC | Age: 30
Discharge: HOME | End: 2020-07-09

## 2020-07-09 ENCOUNTER — HOSPITAL ENCOUNTER (OUTPATIENT)
Dept: RADIOLOGY | Facility: HOSPITAL | Age: 30
Discharge: HOME | End: 2020-07-09
Attending: INTERNAL MEDICINE

## 2020-07-09 ENCOUNTER — OFFICE VISIT (OUTPATIENT)
Dept: INTERNAL MEDICINE | Facility: CLINIC | Age: 30
End: 2020-07-09

## 2020-07-09 VITALS
WEIGHT: 153.69 LBS | BODY MASS INDEX: 22.7 KG/M2 | TEMPERATURE: 99 F | SYSTOLIC BLOOD PRESSURE: 120 MMHG | HEART RATE: 58 BPM | DIASTOLIC BLOOD PRESSURE: 83 MMHG

## 2020-07-09 DIAGNOSIS — Z00.00 ROUTINE GENERAL MEDICAL EXAMINATION AT A HEALTH CARE FACILITY: Primary | ICD-10-CM

## 2020-07-09 DIAGNOSIS — Z00.00 ROUTINE GENERAL MEDICAL EXAMINATION AT A HEALTH CARE FACILITY: ICD-10-CM

## 2020-07-09 DIAGNOSIS — Z00.00 ANNUAL PHYSICAL EXAM: Primary | ICD-10-CM

## 2020-07-09 DIAGNOSIS — Z00.00 ENCOUNTER FOR ANNUAL HEALTH EXAMINATION: Primary | ICD-10-CM

## 2020-07-09 LAB
ALBUMIN SERPL BCP-MCNC: 4.4 G/DL (ref 3.5–5.2)
ALP SERPL-CCNC: 53 U/L (ref 55–135)
ALT SERPL W/O P-5'-P-CCNC: 20 U/L (ref 10–44)
ANION GAP SERPL CALC-SCNC: 7 MMOL/L (ref 8–16)
AST SERPL-CCNC: 14 U/L (ref 10–40)
BILIRUB SERPL-MCNC: 0.5 MG/DL (ref 0.1–1)
BUN SERPL-MCNC: 13 MG/DL (ref 6–20)
CALCIUM SERPL-MCNC: 9.2 MG/DL (ref 8.7–10.5)
CHLORIDE SERPL-SCNC: 111 MMOL/L (ref 95–110)
CHOLEST SERPL-MCNC: 153 MG/DL (ref 120–199)
CHOLEST/HDLC SERPL: 4.3 {RATIO} (ref 2–5)
CO2 SERPL-SCNC: 26 MMOL/L (ref 23–29)
CREAT SERPL-MCNC: 1 MG/DL (ref 0.5–1.4)
ERYTHROCYTE [DISTWIDTH] IN BLOOD BY AUTOMATED COUNT: 12.2 % (ref 11.5–14.5)
EST. GFR  (AFRICAN AMERICAN): >60 ML/MIN/1.73 M^2
EST. GFR  (NON AFRICAN AMERICAN): >60 ML/MIN/1.73 M^2
ESTIMATED AVG GLUCOSE: 97 MG/DL (ref 68–131)
GLUCOSE SERPL-MCNC: 104 MG/DL (ref 70–110)
HBA1C MFR BLD HPLC: 5 % (ref 4–5.6)
HCT VFR BLD AUTO: 45.5 % (ref 40–54)
HDLC SERPL-MCNC: 36 MG/DL (ref 40–75)
HDLC SERPL: 23.5 % (ref 20–50)
HGB BLD-MCNC: 14.9 G/DL (ref 14–18)
LDLC SERPL CALC-MCNC: 89.8 MG/DL (ref 63–159)
MCH RBC QN AUTO: 29.7 PG (ref 27–31)
MCHC RBC AUTO-ENTMCNC: 32.7 G/DL (ref 32–36)
MCV RBC AUTO: 91 FL (ref 82–98)
NONHDLC SERPL-MCNC: 117 MG/DL
PLATELET # BLD AUTO: 259 K/UL (ref 150–350)
PMV BLD AUTO: 9.7 FL (ref 9.2–12.9)
POTASSIUM SERPL-SCNC: 4.3 MMOL/L (ref 3.5–5.1)
PROT SERPL-MCNC: 7 G/DL (ref 6–8.4)
RBC # BLD AUTO: 5.01 M/UL (ref 4.6–6.2)
SODIUM SERPL-SCNC: 144 MMOL/L (ref 136–145)
TESTOST SERPL-MCNC: 477 NG/DL (ref 304–1227)
TRIGL SERPL-MCNC: 136 MG/DL (ref 30–150)
TSH SERPL DL<=0.005 MIU/L-ACNC: 1.69 UIU/ML (ref 0.4–4)
WBC # BLD AUTO: 4.01 K/UL (ref 3.9–12.7)

## 2020-07-09 PROCEDURE — 36415 COLL VENOUS BLD VENIPUNCTURE: CPT

## 2020-07-09 PROCEDURE — 71046 X-RAY EXAM CHEST 2 VIEWS: CPT | Mod: 26,,, | Performed by: RADIOLOGY

## 2020-07-09 PROCEDURE — 99999 PR PBB SHADOW E&M-EST. PATIENT-LVL III: CPT | Mod: PBBFAC,,, | Performed by: INTERNAL MEDICINE

## 2020-07-09 PROCEDURE — 99385 PREV VISIT NEW AGE 18-39: CPT | Mod: S$GLB,,, | Performed by: INTERNAL MEDICINE

## 2020-07-09 PROCEDURE — 71046 X-RAY EXAM CHEST 2 VIEWS: CPT | Mod: TC,FY

## 2020-07-09 PROCEDURE — 84403 ASSAY OF TOTAL TESTOSTERONE: CPT

## 2020-07-09 PROCEDURE — 83036 HEMOGLOBIN GLYCOSYLATED A1C: CPT

## 2020-07-09 PROCEDURE — 84443 ASSAY THYROID STIM HORMONE: CPT

## 2020-07-09 PROCEDURE — 85027 COMPLETE CBC AUTOMATED: CPT

## 2020-07-09 PROCEDURE — 93010 ELECTROCARDIOGRAM REPORT: CPT | Mod: S$PBB,,, | Performed by: INTERNAL MEDICINE

## 2020-07-09 PROCEDURE — 80061 LIPID PANEL: CPT

## 2020-07-09 PROCEDURE — 97750 PHYSICAL PERFORMANCE TEST: CPT | Mod: S$GLB,,, | Performed by: INTERNAL MEDICINE

## 2020-07-09 PROCEDURE — 80053 COMPREHEN METABOLIC PANEL: CPT

## 2020-07-09 PROCEDURE — 97750 PR PHYSICAL PERFORMANCE TEST: ICD-10-PCS | Mod: S$GLB,,, | Performed by: INTERNAL MEDICINE

## 2020-07-09 PROCEDURE — 97802 MEDICAL NUTRITION INDIV IN: CPT | Mod: S$GLB,,, | Performed by: INTERNAL MEDICINE

## 2020-07-09 PROCEDURE — 93010 EKG 12-LEAD: ICD-10-PCS | Mod: S$PBB,,, | Performed by: INTERNAL MEDICINE

## 2020-07-09 PROCEDURE — 97802 PR MED NUTR THER, 1ST, INDIV, EA 15 MIN: ICD-10-PCS | Mod: S$GLB,,, | Performed by: INTERNAL MEDICINE

## 2020-07-09 PROCEDURE — 99385 PR PREVENTIVE VISIT,NEW,18-39: ICD-10-PCS | Mod: S$GLB,,, | Performed by: INTERNAL MEDICINE

## 2020-07-09 PROCEDURE — 71046 XR CHEST PA AND LATERAL: ICD-10-PCS | Mod: 26,,, | Performed by: RADIOLOGY

## 2020-07-09 PROCEDURE — 99999 PR PBB SHADOW E&M-EST. PATIENT-LVL III: ICD-10-PCS | Mod: PBBFAC,,, | Performed by: INTERNAL MEDICINE

## 2020-07-09 PROCEDURE — 93005 ELECTROCARDIOGRAM TRACING: CPT | Mod: PBBFAC | Performed by: INTERNAL MEDICINE

## 2020-07-09 NOTE — LETTER
July 9, 2020    Dong Denny  [address] [facility] [place] [address]       [facility] Internal Medicine  [address] [facility] [address]  Phone: [phone]  Fax: [phone] Dear Mr. Denny:    Thank you for allowing me to serve you and perform your Executive Health exam on 7/9/2020.  This letter will serve a brief summary of the history, physical findings, and labs/studies performed and recommendations at that time. I have included the lab and study results for you to have available at future healthcare appointments.      Reason for Visit: Executive Health Preventive Physical Examination      Past Medical History:  Past Medical History:   Diagnosis Date    Allergic rhinitis     Back pain with sciatica     Related to strength training in past. Resolved.    Fatigue     Kidney stones          Medications:    fluticasone propionate (FLONASE) 50 mcg/actuation nasal spray, 1 spray (50 mcg total) by Each Nostril route once daily.    montelukast (SINGULAIR) 10 mg tablet, Take 1 tablet (10 mg total) by mouth once daily.      Physical Exam: No concerning findings.      Labs:  Comprehensive metabolic panel    Collection Time: 07/09/20  8:07 AM   Result Value Ref Range    Sodium 144 136 - 145 mmol/L    Potassium 4.3 3.5 - 5.1 mmol/L    Chloride 111 (H) 95 - 110 mmol/L    CO2 26 23 - 29 mmol/L    Glucose 104 70 - 110 mg/dL    BUN, Bld 13 6 - 20 mg/dL    Creatinine 1.0 0.5 - 1.4 mg/dL    Calcium 9.2 8.7 - 10.5 mg/dL    Total Protein 7.0 6.0 - 8.4 g/dL    Albumin 4.4 3.5 - 5.2 g/dL    Total Bilirubin 0.5 0.1 - 1.0 mg/dL    Alkaline Phosphatase 53 (L) 55 - 135 U/L    AST 14 10 - 40 U/L    ALT 20 10 - 44 U/L    Anion Gap 7 (L) 8 - 16 mmol/L    eGFR if African American >60.0 >60 mL/min/1.73 m^2    eGFR if non African American >60.0 >60 mL/min/1.73 m^2   CBC Without Differential    Collection Time: 07/09/20  8:07 AM   Result Value Ref Range    WBC 4.01 3.90 - 12.70 K/uL    RBC  5.01 4.60 - 6.20 M/uL    Hemoglobin 14.9 14.0 - 18.0 g/dL    Hematocrit 45.5 40.0 - 54.0 %    Mean Corpuscular Volume 91 82 - 98 fL    Mean Corpuscular Hemoglobin 29.7 27.0 - 31.0 pg    Mean Corpuscular Hemoglobin Conc 32.7 32.0 - 36.0 g/dL    RDW 12.2 11.5 - 14.5 %    Platelets 259 150 - 350 K/uL    MPV 9.7 9.2 - 12.9 fL   Lipid panel    Collection Time: 07/09/20  8:07 AM   Result Value Ref Range    Cholesterol 153 120 - 199 mg/dL    Triglycerides 136 30 - 150 mg/dL    HDL 36 (L) 40 - 75 mg/dL    LDL Cholesterol 89.8 63.0 - 159.0 mg/dL    Hdl/Cholesterol Ratio 23.5 20.0 - 50.0 %    Total Cholesterol/HDL Ratio 4.3 2.0 - 5.0    Non-HDL Cholesterol 117 mg/dL   Hemoglobin A1c    Collection Time: 07/09/20  8:07 AM   Result Value Ref Range    Hemoglobin A1C 5.0 4.0 - 5.6 %    Estimated Avg Glucose 97 68 - 131 mg/dL   TSH    Collection Time: 07/09/20  8:07 AM   Result Value Ref Range    TSH 1.690 0.400 - 4.000 uIU/mL   Testosterone    Collection Time: 07/09/20  8:07 AM   Result Value Ref Range    Testosterone, Total 477 304 - 1227 ng/dL     Chest Xray: Normal    EKG: Sinus bradycardia (normal)      Assessment/Recommendations:    Health Maintenance and Prevention:  - Continue annual flu vaccine  - Tetanus booster up to date (2016). Repeat every 10 years.    - HIV screening was negative in 2017.    - Blood pressure, cholesterol and blood sugar levels are all within normal range.       Fatigue - All lab work has returned normal. This included normal liver, kidney, thyroid, and testosterone screening. Overall, these findings suggest you to be in good health. As we discussed, focus in on all lifestyle factors, including stress control, diet, caffeine intake, exercise and sleep. Particularly important to take note of any changes around times when your energy levels seem to be at good levels. Follow up with your primary care doctor if persistent or worsening.      It was a pleasure meeting you for your annual health exam,   E[place].    If you have any questions or concerns, please don't hesitate to call.    Sincerely,    Mauro Mullen MD

## 2020-07-09 NOTE — PROGRESS NOTES
Subjective:       Patient ID: Dong Denny is a 29 y.o. male.    Chief Complaint: Annual Exam    HPI:  Here for annual health exam. No recent diagnoses. Active and watches diet. Working full-time from home.  Hx of kidney stones but no recent episodes.  Hx of nasal congestion. Had 2 previous turbinate reductions. Currently doing well on daily Singulair with flonase spray.  Hx of over a year of fatigue now with decreased general libido. Not specifically ED. Feels tired after 10 am most days. Discussed lifestyle in depth. He exercises regularly. Diet is balanced and moderate. Takes MV daily. Does not eat much for breakfast though. Currently on a diet keeping calories to 1500 daily but timing of this has no association with issues. Sleeps well without issue. No heavy EtOH or smoking. Relationship with wife is overall very healthy. Stopped Propecia in the past but this did not help symptoms. Caffeine intake is 3 to 4 cups of coffee daily but spread out throughout the day. Occasional anxiety but not pervasive and no clearly depressed mood. Had testosterone check in 2017 which was normal. He would like to repeat this today. Denies hx of anabolic use. No change in testicular size.     HM:  tdap 2016  HIV neg 2017  Lipids, glucose, bp wnl previously.    Review of Systems   Constitutional: Positive for fatigue. Negative for appetite change, fever and unexpected weight change.   HENT: Positive for congestion. Negative for rhinorrhea and sinus pain.    Eyes: Negative for visual disturbance.   Respiratory: Negative for cough and shortness of breath.    Cardiovascular: Negative for chest pain and leg swelling.   Gastrointestinal: Negative for abdominal pain, diarrhea and nausea.   Genitourinary: Negative for difficulty urinating, dysuria and testicular pain.   Musculoskeletal: Positive for arthralgias. Negative for joint swelling.   Skin: Negative for rash and wound.   Neurological: Negative for dizziness, weakness  and headaches.   Psychiatric/Behavioral: Positive for decreased concentration. Negative for dysphoric mood and sleep disturbance. The patient is nervous/anxious.        Past Medical History:   Diagnosis Date    Allergic rhinitis     Back pain with sciatica     Related to strength training in past. Resolved.    Kidney stones        Current Outpatient Medications:     fluticasone propionate (FLONASE) 50 mcg/actuation nasal spray, 1 spray (50 mcg total) by Each Nostril route once daily., Disp: 16 g, Rfl: 6    montelukast (SINGULAIR) 10 mg tablet, Take 1 tablet (10 mg total) by mouth once daily., Disp: 90 tablet, Rfl: 3     Past Surgical History:   Procedure Laterality Date    TONSILLECTOMY      TURBINATE RESECTION Bilateral 2010, 2006    done twice    TYMPANOSTOMY TUBE PLACEMENT Bilateral 1990     Family History   Problem Relation Age of Onset    No Known Problems Mother     Diabetes Father      Social History     Tobacco Use    Smoking status: Never Smoker    Smokeless tobacco: Never Used   Substance Use Topics    Alcohol use: Yes     Comment: socially    Drug use: No       Objective:       Vitals:    07/09/20 1114   BP: 120/83   Pulse: (!) 58   Temp: 98.7 °F (37.1 °C)       Physical Exam  Constitutional:       General: He is not in acute distress.     Appearance: Normal appearance. He is normal weight.   HENT:      Head: Normocephalic and atraumatic.      Right Ear: Ear canal and external ear normal. There is no impacted cerumen.      Left Ear: Ear canal and external ear normal. There is no impacted cerumen.      Ears:      Comments: B/L TM's with scarring consistent with tympanostomy hx.     Nose: Nose normal.   Eyes:      Extraocular Movements: Extraocular movements intact.      Conjunctiva/sclera: Conjunctivae normal.      Pupils: Pupils are equal, round, and reactive to light.   Cardiovascular:      Rate and Rhythm: Normal rate and regular rhythm.      Heart sounds: No murmur.   Pulmonary:       Effort: Pulmonary effort is normal. No respiratory distress.      Breath sounds: Normal breath sounds.   Abdominal:      General: Abdomen is flat. There is no distension.      Palpations: Abdomen is soft.   Musculoskeletal:         General: No swelling or deformity.   Skin:     General: Skin is warm and dry.      Findings: No lesion or rash.   Neurological:      General: No focal deficit present.      Mental Status: He is alert and oriented to person, place, and time.      Coordination: Coordination normal.      Gait: Gait normal.      Deep Tendon Reflexes: Reflexes normal.   Psychiatric:         Mood and Affect: Mood normal.         Behavior: Behavior normal.         Thought Content: Thought content normal.         Judgment: Judgment normal.       Recent Results (from the past 2016 hour(s))   Comprehensive metabolic panel    Collection Time: 07/09/20  8:07 AM   Result Value Ref Range    Sodium 144 136 - 145 mmol/L    Potassium 4.3 3.5 - 5.1 mmol/L    Chloride 111 (H) 95 - 110 mmol/L    CO2 26 23 - 29 mmol/L    Glucose 104 70 - 110 mg/dL    BUN, Bld 13 6 - 20 mg/dL    Creatinine 1.0 0.5 - 1.4 mg/dL    Calcium 9.2 8.7 - 10.5 mg/dL    Total Protein 7.0 6.0 - 8.4 g/dL    Albumin 4.4 3.5 - 5.2 g/dL    Total Bilirubin 0.5 0.1 - 1.0 mg/dL    Alkaline Phosphatase 53 (L) 55 - 135 U/L    AST 14 10 - 40 U/L    ALT 20 10 - 44 U/L    Anion Gap 7 (L) 8 - 16 mmol/L    eGFR if African American >60.0 >60 mL/min/1.73 m^2    eGFR if non African American >60.0 >60 mL/min/1.73 m^2   CBC Without Differential    Collection Time: 07/09/20  8:07 AM   Result Value Ref Range    WBC 4.01 3.90 - 12.70 K/uL    RBC 5.01 4.60 - 6.20 M/uL    Hemoglobin 14.9 14.0 - 18.0 g/dL    Hematocrit 45.5 40.0 - 54.0 %    Mean Corpuscular Volume 91 82 - 98 fL    Mean Corpuscular Hemoglobin 29.7 27.0 - 31.0 pg    Mean Corpuscular Hemoglobin Conc 32.7 32.0 - 36.0 g/dL    RDW 12.2 11.5 - 14.5 %    Platelets 259 150 - 350 K/uL    MPV 9.7 9.2 - 12.9 fL   Lipid  panel    Collection Time: 07/09/20  8:07 AM   Result Value Ref Range    Cholesterol 153 120 - 199 mg/dL    Triglycerides 136 30 - 150 mg/dL    HDL 36 (L) 40 - 75 mg/dL    LDL Cholesterol 89.8 63.0 - 159.0 mg/dL    Hdl/Cholesterol Ratio 23.5 20.0 - 50.0 %    Total Cholesterol/HDL Ratio 4.3 2.0 - 5.0    Non-HDL Cholesterol 117 mg/dL   Hemoglobin A1c    Collection Time: 07/09/20  8:07 AM   Result Value Ref Range    Hemoglobin A1C 5.0 4.0 - 5.6 %    Estimated Avg Glucose 97 68 - 131 mg/dL   TSH    Collection Time: 07/09/20  8:07 AM   Result Value Ref Range    TSH 1.690 0.400 - 4.000 uIU/mL      EKG: Sinus bradycardia  CXR: Normal      Assessment:       1) Annual Exam / Health Maintenance:  - Annual flu vaccine  - Tetanus 2016  - HIV neg 2017  - BP, lipid, a1c wnl.    2) Fatigue, Decreased libido - Normal comprehensive lab work, including normal total T and TSH. For now, discussed focusing in on all lifestyle factors, including stress, diet, caffeine intake, exercise and sleep. Particularly important to take note of any changes around times when energy levels and libido seem to be at good levels. F/U with PCP.

## 2020-07-09 NOTE — PROGRESS NOTES
"Subjective:       Patient ID: Dong Denny is a 29 y.o. male.    Chief Complaint: No chief complaint on file.    HPI   Pt. Has no significant cardiovascular or pulmonary history.    Physical Limitations:  Patient suffered from a "slipped disc" in his low back when performing dead lifts in 2018.  Patient now avoids performing any exercise that involves placing weight on his back such as dead lifts or squats.      Current exercise routine:  Patient currently runs for 20-25 minutes, 2 days a week.  Patient runs for 40 minutes, once a week.  Patient practices full-body resistance training exercises, 3 days a week.  Patient does not follow any formal flexibility routine at the current time.     Goals:  Patient has a goal to reach 15% or under body fat %.    Fun Facts:  Patient was friendly and engaged.  Patient is obsessive about his body fat %.  He has a CLEO at home and has completed 3 DEXA scans in the past.  Patient was receptive to all recommendations made.      Review of Systems      Objective:     The fitness evaluation results are as follows:  D.O.S. 7/9/2020 6/19/2017   Height (in): 69 69   Weight (lbs): 153.7 162   BMI: 22.499345 23.719001   Body Fat (%): 18.40 17.85   Waist (cm): 84 80   Hip (cm): 95 93   WHR: 0.88 0.86   RBP (mmHg): 108/72 110/78   RHR (bpm): 60 62    Strength R (lbs)t: 88.722221 96.168140    Strength Lt (lbs): 98.814918 85.234026   Push-up Assessment: 38 43   Curl-up Assessment: 40 33   Flexibility Testing (cm): 36 40   REE (kcals): 1600 1760       Physical Exam    Assessment:     Age/gender stratified assessment:  Resting BP: Within Normal Limits   Body Fat %: Good   WHR Risk Factor: Low Risk    Strength R: Below Average    Strength L: Average   Upper Body Endurance: Excellent   Abdominal Endurance: Average   Lower body Flexibiltiy: Very Good       1. Routine general medical examination at a health care facility        Plan:       Recommended fitness " guidelines:    -150 minutes of moderate intensity aerobic exercise per week or 75 minutes of vigorous intensity aerobic exercise per week.    -2 to 4 days per week of resistance training for each muscle group.      -Daily stretching with a hold of at least 30 seconds per muscle group.

## 2020-08-20 ENCOUNTER — LAB VISIT (OUTPATIENT)
Dept: PRIMARY CARE CLINIC | Facility: OTHER | Age: 30
End: 2020-08-20
Attending: INTERNAL MEDICINE
Payer: COMMERCIAL

## 2020-08-20 DIAGNOSIS — Z03.818 ENCOUNTER FOR OBSERVATION FOR SUSPECTED EXPOSURE TO OTHER BIOLOGICAL AGENTS RULED OUT: ICD-10-CM

## 2020-08-20 PROCEDURE — U0003 INFECTIOUS AGENT DETECTION BY NUCLEIC ACID (DNA OR RNA); SEVERE ACUTE RESPIRATORY SYNDROME CORONAVIRUS 2 (SARS-COV-2) (CORONAVIRUS DISEASE [COVID-19]), AMPLIFIED PROBE TECHNIQUE, MAKING USE OF HIGH THROUGHPUT TECHNOLOGIES AS DESCRIBED BY CMS-2020-01-R: HCPCS

## 2020-08-22 LAB — SARS-COV-2 RNA RESP QL NAA+PROBE: NORMAL

## 2020-10-07 ENCOUNTER — PATIENT MESSAGE (OUTPATIENT)
Dept: INTERNAL MEDICINE | Facility: CLINIC | Age: 30
End: 2020-10-07

## 2020-10-07 DIAGNOSIS — L64.9 MALE PATTERN ALOPECIA: Primary | ICD-10-CM

## 2020-10-07 RX ORDER — FINASTERIDE 1 MG/1
1 TABLET, FILM COATED ORAL DAILY
Qty: 90 TABLET | Refills: 3 | Status: SHIPPED | OUTPATIENT
Start: 2020-10-07 | End: 2021-10-11

## 2021-01-21 ENCOUNTER — CLINICAL SUPPORT (OUTPATIENT)
Dept: URGENT CARE | Facility: CLINIC | Age: 31
End: 2021-01-21
Payer: COMMERCIAL

## 2021-01-21 VITALS — TEMPERATURE: 99 F | OXYGEN SATURATION: 95 % | HEART RATE: 104 BPM

## 2021-01-21 DIAGNOSIS — Z03.818 ENCOUNTER FOR OBSERVATION FOR SUSPECTED EXPOSURE TO OTHER BIOLOGICAL AGENTS RULED OUT: Primary | ICD-10-CM

## 2021-01-21 LAB
CTP QC/QA: YES
SARS-COV-2 RDRP RESP QL NAA+PROBE: NEGATIVE

## 2021-01-21 PROCEDURE — U0002 COVID-19 LAB TEST NON-CDC: HCPCS | Mod: QW,S$GLB,, | Performed by: INTERNAL MEDICINE

## 2021-01-21 PROCEDURE — U0002: ICD-10-PCS | Mod: QW,S$GLB,, | Performed by: INTERNAL MEDICINE

## 2021-08-22 ENCOUNTER — LAB VISIT (OUTPATIENT)
Dept: PRIMARY CARE CLINIC | Facility: OTHER | Age: 31
End: 2021-08-22
Payer: COMMERCIAL

## 2021-08-22 DIAGNOSIS — R05.9 COUGH: ICD-10-CM

## 2021-08-22 PROCEDURE — U0003 INFECTIOUS AGENT DETECTION BY NUCLEIC ACID (DNA OR RNA); SEVERE ACUTE RESPIRATORY SYNDROME CORONAVIRUS 2 (SARS-COV-2) (CORONAVIRUS DISEASE [COVID-19]), AMPLIFIED PROBE TECHNIQUE, MAKING USE OF HIGH THROUGHPUT TECHNOLOGIES AS DESCRIBED BY CMS-2020-01-R: HCPCS | Performed by: INTERNAL MEDICINE

## 2021-08-23 LAB
SARS-COV-2 RNA RESP QL NAA+PROBE: NOT DETECTED
SARS-COV-2- CYCLE NUMBER: NORMAL

## 2021-10-04 ENCOUNTER — PATIENT MESSAGE (OUTPATIENT)
Dept: ADMINISTRATIVE | Facility: HOSPITAL | Age: 31
End: 2021-10-04

## 2022-01-21 DIAGNOSIS — L64.9 MALE PATTERN ALOPECIA: ICD-10-CM

## 2022-01-21 NOTE — TELEPHONE ENCOUNTER
Care Due:                  Date            Visit Type   Department     Provider  --------------------------------------------------------------------------------    Last Visit: None Found      None         None Found  Next Visit: None Scheduled  None         None Found                                                            Last  Test          Frequency    Reason                     Performed    Due Date  --------------------------------------------------------------------------------    Office Visit  12 months..  finasteride..............  Not Found    Overdue    Powered by [employer]. Reference number: [id number].   1/21/2022 11:31:40 AM CST

## 2022-01-24 RX ORDER — FINASTERIDE 1 MG/1
TABLET, FILM COATED ORAL
Qty: 90 TABLET | Refills: 0 | Status: SHIPPED | OUTPATIENT
Start: 2022-01-24 | End: 2022-10-04

## 2022-01-25 ENCOUNTER — TELEPHONE (OUTPATIENT)
Dept: INTERNAL MEDICINE | Facility: CLINIC | Age: 32
End: 2022-01-25
Payer: COMMERCIAL

## 2022-01-25 NOTE — TELEPHONE ENCOUNTER
----- Message from Jeannine Gordon sent at 1/25/2022  2:22 PM CST -----  Regarding: Scheduling  Please contact patient for his follow-up appointment, ihe will be out of meds before I can schedule him.  Thanks

## 2022-03-16 ENCOUNTER — PATIENT MESSAGE (OUTPATIENT)
Dept: ADMINISTRATIVE | Facility: HOSPITAL | Age: 32
End: 2022-03-16
Payer: COMMERCIAL

## 2022-06-06 ENCOUNTER — PATIENT MESSAGE (OUTPATIENT)
Dept: INTERNAL MEDICINE | Facility: CLINIC | Age: 32
End: 2022-06-06
Payer: COMMERCIAL

## 2022-09-21 DIAGNOSIS — Z00.00 ROUTINE GENERAL MEDICAL EXAMINATION AT A HEALTH CARE FACILITY: Primary | ICD-10-CM

## 2022-10-04 ENCOUNTER — HOSPITAL ENCOUNTER (OUTPATIENT)
Dept: CARDIOLOGY | Facility: CLINIC | Age: 32
Discharge: HOME | End: 2022-10-04
Payer: COMMERCIAL

## 2022-10-04 ENCOUNTER — CLINICAL SUPPORT (OUTPATIENT)
Dept: INTERNAL MEDICINE | Facility: CLINIC | Age: 32
End: 2022-10-04
Payer: COMMERCIAL

## 2022-10-04 ENCOUNTER — OFFICE VISIT (OUTPATIENT)
Dept: INTERNAL MEDICINE | Facility: CLINIC | Age: 32
End: 2022-10-04
Payer: COMMERCIAL

## 2022-10-04 VITALS
SYSTOLIC BLOOD PRESSURE: 126 MMHG | DIASTOLIC BLOOD PRESSURE: 83 MMHG | WEIGHT: 160.38 LBS | BODY MASS INDEX: 23.76 KG/M2 | HEART RATE: 71 BPM | HEIGHT: 69 IN

## 2022-10-04 DIAGNOSIS — Z00.00 ROUTINE GENERAL MEDICAL EXAMINATION AT A HEALTH CARE FACILITY: Primary | ICD-10-CM

## 2022-10-04 DIAGNOSIS — Z00.00 ROUTINE GENERAL MEDICAL EXAMINATION AT A HEALTH CARE FACILITY: ICD-10-CM

## 2022-10-04 DIAGNOSIS — Z00.00 ENCOUNTER FOR ANNUAL HEALTH EXAMINATION: Primary | ICD-10-CM

## 2022-10-04 LAB
ALBUMIN SERPL BCP-MCNC: 4.5 G/DL (ref 3.5–5.2)
ALP SERPL-CCNC: 43 U/L (ref 55–135)
ALT SERPL W/O P-5'-P-CCNC: 28 U/L (ref 10–44)
ANION GAP SERPL CALC-SCNC: 9 MMOL/L (ref 8–16)
AST SERPL-CCNC: 19 U/L (ref 10–40)
BACTERIA #/AREA URNS AUTO: NORMAL /HPF
BILIRUB SERPL-MCNC: 1.1 MG/DL (ref 0.1–1)
BILIRUB UR QL STRIP: NEGATIVE
BUN SERPL-MCNC: 11 MG/DL (ref 6–20)
CALCIUM SERPL-MCNC: 9.2 MG/DL (ref 8.7–10.5)
CHLORIDE SERPL-SCNC: 101 MMOL/L (ref 95–110)
CHOLEST SERPL-MCNC: 160 MG/DL (ref 120–199)
CHOLEST/HDLC SERPL: 5.5 {RATIO} (ref 2–5)
CLARITY UR REFRACT.AUTO: CLEAR
CO2 SERPL-SCNC: 27 MMOL/L (ref 23–29)
COLOR UR AUTO: YELLOW
CREAT SERPL-MCNC: 1.1 MG/DL (ref 0.5–1.4)
ERYTHROCYTE [DISTWIDTH] IN BLOOD BY AUTOMATED COUNT: 13 % (ref 11.5–14.5)
EST. GFR  (NO RACE VARIABLE): >60 ML/MIN/1.73 M^2
ESTIMATED AVG GLUCOSE: 94 MG/DL (ref 68–131)
GLUCOSE SERPL-MCNC: 103 MG/DL (ref 70–110)
GLUCOSE UR QL STRIP: NEGATIVE
HBA1C MFR BLD: 4.9 % (ref 4–5.6)
HCT VFR BLD AUTO: 48 % (ref 40–54)
HDLC SERPL-MCNC: 29 MG/DL (ref 40–75)
HDLC SERPL: 18.1 % (ref 20–50)
HGB BLD-MCNC: 16.6 G/DL (ref 14–18)
HGB UR QL STRIP: NEGATIVE
KETONES UR QL STRIP: NEGATIVE
LDLC SERPL CALC-MCNC: 88.8 MG/DL (ref 63–159)
LEUKOCYTE ESTERASE UR QL STRIP: ABNORMAL
MCH RBC QN AUTO: 30.9 PG (ref 27–31)
MCHC RBC AUTO-ENTMCNC: 34.6 G/DL (ref 32–36)
MCV RBC AUTO: 89 FL (ref 82–98)
MICROSCOPIC COMMENT: NORMAL
NITRITE UR QL STRIP: NEGATIVE
NONHDLC SERPL-MCNC: 131 MG/DL
PH UR STRIP: 8 [PH] (ref 5–8)
PLATELET # BLD AUTO: 285 K/UL (ref 150–450)
PMV BLD AUTO: 9.2 FL (ref 9.2–12.9)
POTASSIUM SERPL-SCNC: 4.1 MMOL/L (ref 3.5–5.1)
PROT SERPL-MCNC: 7.1 G/DL (ref 6–8.4)
PROT UR QL STRIP: NEGATIVE
RBC # BLD AUTO: 5.37 M/UL (ref 4.6–6.2)
RBC #/AREA URNS AUTO: 2 /HPF (ref 0–4)
SODIUM SERPL-SCNC: 137 MMOL/L (ref 136–145)
SP GR UR STRIP: 1.02 (ref 1–1.03)
TRIGL SERPL-MCNC: 211 MG/DL (ref 30–150)
TSH SERPL DL<=0.005 MIU/L-ACNC: 1.42 UIU/ML (ref 0.4–4)
URN SPEC COLLECT METH UR: ABNORMAL
WBC # BLD AUTO: 4.1 K/UL (ref 3.9–12.7)
WBC #/AREA URNS AUTO: 5 /HPF (ref 0–5)

## 2022-10-04 PROCEDURE — 93010 ELECTROCARDIOGRAM REPORT: CPT | Mod: S$GLB,,, | Performed by: INTERNAL MEDICINE

## 2022-10-04 PROCEDURE — 90471 FLU VACCINE (QUAD) GREATER THAN OR EQUAL TO 3YO PRESERVATIVE FREE IM: ICD-10-PCS | Mod: S$GLB,,, | Performed by: INTERNAL MEDICINE

## 2022-10-04 PROCEDURE — 99395 PREV VISIT EST AGE 18-39: CPT | Mod: 25,S$GLB,, | Performed by: INTERNAL MEDICINE

## 2022-10-04 PROCEDURE — 99999 PR PBB SHADOW E&M-EST. PATIENT-LVL III: CPT | Mod: PBBFAC,,, | Performed by: INTERNAL MEDICINE

## 2022-10-04 PROCEDURE — 90686 FLU VACCINE (QUAD) GREATER THAN OR EQUAL TO 3YO PRESERVATIVE FREE IM: ICD-10-PCS | Mod: S$GLB,,, | Performed by: INTERNAL MEDICINE

## 2022-10-04 PROCEDURE — 3074F PR MOST RECENT SYSTOLIC BLOOD PRESSURE < 130 MM HG: ICD-10-PCS | Mod: CPTII,S$GLB,, | Performed by: INTERNAL MEDICINE

## 2022-10-04 PROCEDURE — 1159F PR MEDICATION LIST DOCUMENTED IN MEDICAL RECORD: ICD-10-PCS | Mod: CPTII,S$GLB,, | Performed by: INTERNAL MEDICINE

## 2022-10-04 PROCEDURE — 93005 EKG 12-LEAD: ICD-10-PCS | Mod: S$GLB,,, | Performed by: INTERNAL MEDICINE

## 2022-10-04 PROCEDURE — 84443 ASSAY THYROID STIM HORMONE: CPT | Performed by: INTERNAL MEDICINE

## 2022-10-04 PROCEDURE — 80061 LIPID PANEL: CPT | Performed by: INTERNAL MEDICINE

## 2022-10-04 PROCEDURE — 3074F SYST BP LT 130 MM HG: CPT | Mod: CPTII,S$GLB,, | Performed by: INTERNAL MEDICINE

## 2022-10-04 PROCEDURE — 90686 IIV4 VACC NO PRSV 0.5 ML IM: CPT | Mod: S$GLB,,, | Performed by: INTERNAL MEDICINE

## 2022-10-04 PROCEDURE — 1159F MED LIST DOCD IN RCRD: CPT | Mod: CPTII,S$GLB,, | Performed by: INTERNAL MEDICINE

## 2022-10-04 PROCEDURE — 90471 IMMUNIZATION ADMIN: CPT | Mod: S$GLB,,, | Performed by: INTERNAL MEDICINE

## 2022-10-04 PROCEDURE — 81001 URINALYSIS AUTO W/SCOPE: CPT | Performed by: INTERNAL MEDICINE

## 2022-10-04 PROCEDURE — 80053 COMPREHEN METABOLIC PANEL: CPT | Performed by: INTERNAL MEDICINE

## 2022-10-04 PROCEDURE — 3079F DIAST BP 80-89 MM HG: CPT | Mod: CPTII,S$GLB,, | Performed by: INTERNAL MEDICINE

## 2022-10-04 PROCEDURE — 3044F PR MOST RECENT HEMOGLOBIN A1C LEVEL <7.0%: ICD-10-PCS | Mod: CPTII,S$GLB,, | Performed by: INTERNAL MEDICINE

## 2022-10-04 PROCEDURE — 99999 PR PBB SHADOW E&M-EST. PATIENT-LVL III: ICD-10-PCS | Mod: PBBFAC,,, | Performed by: INTERNAL MEDICINE

## 2022-10-04 PROCEDURE — 3044F HG A1C LEVEL LT 7.0%: CPT | Mod: CPTII,S$GLB,, | Performed by: INTERNAL MEDICINE

## 2022-10-04 PROCEDURE — 3008F PR BODY MASS INDEX (BMI) DOCUMENTED: ICD-10-PCS | Mod: CPTII,S$GLB,, | Performed by: INTERNAL MEDICINE

## 2022-10-04 PROCEDURE — 93010 EKG 12-LEAD: ICD-10-PCS | Mod: S$GLB,,, | Performed by: INTERNAL MEDICINE

## 2022-10-04 PROCEDURE — 83036 HEMOGLOBIN GLYCOSYLATED A1C: CPT | Performed by: INTERNAL MEDICINE

## 2022-10-04 PROCEDURE — 99395 PR PREVENTIVE VISIT,EST,18-39: ICD-10-PCS | Mod: 25,S$GLB,, | Performed by: INTERNAL MEDICINE

## 2022-10-04 PROCEDURE — 3079F PR MOST RECENT DIASTOLIC BLOOD PRESSURE 80-89 MM HG: ICD-10-PCS | Mod: CPTII,S$GLB,, | Performed by: INTERNAL MEDICINE

## 2022-10-04 PROCEDURE — 3008F BODY MASS INDEX DOCD: CPT | Mod: CPTII,S$GLB,, | Performed by: INTERNAL MEDICINE

## 2022-10-04 PROCEDURE — 85027 COMPLETE CBC AUTOMATED: CPT | Performed by: INTERNAL MEDICINE

## 2022-10-04 PROCEDURE — 93005 ELECTROCARDIOGRAM TRACING: CPT | Mod: S$GLB,,, | Performed by: INTERNAL MEDICINE

## 2022-10-04 PROCEDURE — 36415 COLL VENOUS BLD VENIPUNCTURE: CPT | Performed by: INTERNAL MEDICINE

## 2022-10-04 RX ORDER — CHORIONIC GONADOTROPIN 10000 UNIT
KIT INTRAMUSCULAR
COMMUNITY
Start: 2022-05-16

## 2022-10-04 RX ORDER — DOXYCYCLINE 100 MG/1
1 TABLET ORAL DAILY PRN
COMMUNITY
Start: 2022-09-12 | End: 2023-09-22

## 2022-10-04 RX ORDER — CLOBETASOL PROPIONATE 0.46 MG/ML
SOLUTION TOPICAL
COMMUNITY
Start: 2022-09-12 | End: 2023-09-22

## 2022-10-04 RX ORDER — KETOCONAZOLE 20 MG/ML
SHAMPOO, SUSPENSION TOPICAL
COMMUNITY
Start: 2022-09-12 | End: 2023-09-22

## 2022-10-04 RX ORDER — DUTASTERIDE 0.5 MG/1
0.5 CAPSULE, LIQUID FILLED ORAL DAILY
COMMUNITY
Start: 2022-08-15 | End: 2023-09-22

## 2022-10-04 NOTE — PROGRESS NOTES
Subjective:       Patient ID: Dong Denny is a 32 y.o. male.    Chief Complaint: Executive Health and Possible Kidney Stones      HPI  Annual health exam. Reviewed medical, surgical, social and family history, medications, appropriate preventive health screenings, as well as vaccination history. Updates as noted below or in assessment and plan.    EH wellness exam through work. Works for [employer], mostly from home.  Notes right sided flank pain similar to kidney stone in past over past couple of weeks with associated transient red urine. Now resolved. Admits diet has been off, on vacation recently. Does feel he hydrates well typically. 3 kidney stones previously, calcium oxalate in past.  On TRT and hcg now, followed by outside physician. Does feel it helps. Getting  this coming January. Hx of hair transplant x2 followed at [facility] Hair restoration. Has recently been on Doxy and topical steroid to potentially help with healing and regrowth quicker.    Review of Systems   All other systems reviewed and are negative.    Past Medical History:   Diagnosis Date    Allergic rhinitis     Androgenetic alopecia     Back pain with sciatica     Related to strength training in past. Resolved.    Decreased libido     Fatigue     Kidney stones     Low testosterone          Current Outpatient Medications:     chorionic gonadotropin, human (PREGNYL) 10,000 unit injection, SMARTSI Unit(s) SUB-Q Twice a Week, Disp: , Rfl:     clobetasoL (TEMOVATE) 0.05 % external solution, Apply topically., Disp: , Rfl:     doxycycline monohydrate 100 mg Tab, Take 1 tablet by mouth daily as needed., Disp: , Rfl:     dutasteride (AVODART) 0.5 mg capsule, Take 0.5 mg by mouth once daily., Disp: , Rfl:     fluticasone propionate (FLONASE) 50 mcg/actuation nasal spray, 1 spray (50 mcg total) by Each Nostril route once daily., Disp: 16 g, Rfl: 6    ketoconazole (NIZORAL) 2 % shampoo, Apply topically., Disp: , Rfl:     montelukast  (SINGULAIR) 10 mg tablet, TAKE ONE TABLET BY MOUTH ONE TIME DAILY, Disp: 90 tablet, Rfl: 0    Past Surgical History:   Procedure Laterality Date    TONSILLECTOMY      TURBINATE RESECTION Bilateral 2010, 2006    done twice    TYMPANOSTOMY TUBE PLACEMENT Bilateral 1990       Family History   Problem Relation Age of Onset    No Known Problems Mother     Diabetes Father        Social History     Tobacco Use    Smoking status: Never    Smokeless tobacco: Never   Substance Use Topics    Alcohol use: Yes     Comment: socially    Drug use: No       Immunization History   Administered Date(s) Administered    Hepatitis B 10/16/1998, 11/16/1998, 03/05/1999    Influenza - Quadrivalent - PF *Preferred* (6 months and older) 10/04/2022    Tdap 09/19/2016         Objective:      Vitals:    10/04/22 1005   BP: 126/83   Pulse: 71       Physical Exam  Constitutional:       General: He is not in acute distress.     Appearance: Normal appearance. He is well-developed. He is not ill-appearing.   HENT:      Head: Normocephalic and atraumatic.      Right Ear: Hearing normal.      Left Ear: Hearing normal.   Eyes:      Extraocular Movements: Extraocular movements intact.      Conjunctiva/sclera: Conjunctivae normal.   Cardiovascular:      Rate and Rhythm: Normal rate and regular rhythm.      Heart sounds: Normal heart sounds. No murmur heard.  Pulmonary:      Effort: Pulmonary effort is normal. No respiratory distress.      Breath sounds: Normal breath sounds. No wheezing, rhonchi or rales.   Abdominal:      General: Abdomen is flat. There is no distension.      Palpations: Abdomen is soft.   Musculoskeletal:         General: No swelling or deformity.      Cervical back: No tenderness.      Right lower leg: No edema.      Left lower leg: No edema.   Lymphadenopathy:      Cervical: No cervical adenopathy.   Skin:     General: Skin is warm and dry.      Findings: No lesion or rash.   Neurological:      General: No focal deficit present.       Mental Status: He is alert and oriented to person, place, and time.      Cranial Nerves: No cranial nerve deficit.      Coordination: Coordination normal.      Gait: Gait normal.   Psychiatric:         Mood and Affect: Mood normal.         Behavior: Behavior normal.         Thought Content: Thought content normal.         Judgment: Judgment normal.       Recent Results (from the past 2016 hour(s))   Comprehensive metabolic panel    Collection Time: 10/04/22  9:23 AM   Result Value Ref Range    Sodium 137 136 - 145 mmol/L    Potassium 4.1 3.5 - 5.1 mmol/L    Chloride 101 95 - 110 mmol/L    CO2 27 23 - 29 mmol/L    Glucose 103 70 - 110 mg/dL    BUN 11 6 - 20 mg/dL    Creatinine 1.1 0.5 - 1.4 mg/dL    Calcium 9.2 8.7 - 10.5 mg/dL    Total Protein 7.1 6.0 - 8.4 g/dL    Albumin 4.5 3.5 - 5.2 g/dL    Total Bilirubin 1.1 (H) 0.1 - 1.0 mg/dL    Alkaline Phosphatase 43 (L) 55 - 135 U/L    AST 19 10 - 40 U/L    ALT 28 10 - 44 U/L    Anion Gap 9 8 - 16 mmol/L    eGFR >60.0 >60 mL/min/1.73 m^2   CBC Without Differential    Collection Time: 10/04/22  9:23 AM   Result Value Ref Range    WBC 4.10 3.90 - 12.70 K/uL    RBC 5.37 4.60 - 6.20 M/uL    Hemoglobin 16.6 14.0 - 18.0 g/dL    Hematocrit 48.0 40.0 - 54.0 %    MCV 89 82 - 98 fL    MCH 30.9 27.0 - 31.0 pg    MCHC 34.6 32.0 - 36.0 g/dL    RDW 13.0 11.5 - 14.5 %    Platelets 285 150 - 450 K/uL    MPV 9.2 9.2 - 12.9 fL   Lipid panel    Collection Time: 10/04/22  9:23 AM   Result Value Ref Range    Cholesterol 160 120 - 199 mg/dL    Triglycerides 211 (H) 30 - 150 mg/dL    HDL 29 (L) 40 - 75 mg/dL    LDL Cholesterol 88.8 63.0 - 159.0 mg/dL    HDL/Cholesterol Ratio 18.1 (L) 20.0 - 50.0 %    Total Cholesterol/HDL Ratio 5.5 (H) 2.0 - 5.0    Non-HDL Cholesterol 131 mg/dL   Hemoglobin A1c    Collection Time: 10/04/22  9:23 AM   Result Value Ref Range    Hemoglobin A1C 4.9 4.0 - 5.6 %    Estimated Avg Glucose 94 68 - 131 mg/dL   TSH    Collection Time: 10/04/22  9:23 AM   Result Value Ref  Range    TSH 1.419 0.400 - 4.000 uIU/mL   Urinalysis    Collection Time: 10/04/22  9:23 AM   Result Value Ref Range    Specimen UA Urine, Unspecified     Color, UA Yellow Yellow, Straw, Valerie    Appearance, UA Clear Clear    pH, UA 8.0 5.0 - 8.0    Specific Gravity, UA 1.020 1.005 - 1.030    Protein, UA Negative Negative    Glucose, UA Negative Negative    Ketones, UA Negative Negative    Bilirubin (UA) Negative Negative    Occult Blood UA Negative Negative    Nitrite, UA Negative Negative    Leukocytes, UA Trace (A) Negative   Urinalysis Microscopic    Collection Time: 10/04/22  9:23 AM   Result Value Ref Range    RBC, UA 2 0 - 4 /hpf    WBC, UA 5 0 - 5 /hpf    Bacteria Rare None-Occ /hpf    Microscopic Comment SEE COMMENT           Assessment/Plan:     1) Annual wellness exam  2) Kidney stones - Possible recent episode on right that is now resolved clinically. No concerning UA findings with only 5 WBC's on micro.  3) Dyslipidemia - Low HDL, mild tg elevation. Just back from vacation. Notes diet very much off.  4) Low testosterone - On TRT and hcg, followed by outside physician.  5) Rhinosinusitis - Controlled on Flonase and Singulair.    - Annual flu vaccine today.  - Focus on exercise and diet with limitation of sugars and carbs. Repeat lipids 1 yr. TG elevation out of norm for patient and in setting of recent vacation.  - Focus on diet and hydration. If recurrence of kidney stone pain in near future, consider imaging and/or Urology eval.

## 2022-10-12 ENCOUNTER — LAB VISIT (OUTPATIENT)
Dept: LAB | Facility: HOSPITAL | Age: 32
End: 2022-10-12
Attending: UROLOGY
Payer: COMMERCIAL

## 2022-10-12 ENCOUNTER — PATIENT MESSAGE (OUTPATIENT)
Dept: UROLOGY | Facility: CLINIC | Age: 32
End: 2022-10-12

## 2022-10-12 ENCOUNTER — OFFICE VISIT (OUTPATIENT)
Dept: UROLOGY | Facility: CLINIC | Age: 32
End: 2022-10-12
Payer: COMMERCIAL

## 2022-10-12 VITALS
DIASTOLIC BLOOD PRESSURE: 90 MMHG | SYSTOLIC BLOOD PRESSURE: 147 MMHG | BODY MASS INDEX: 23.49 KG/M2 | WEIGHT: 158.63 LBS | HEIGHT: 69 IN | HEART RATE: 65 BPM

## 2022-10-12 DIAGNOSIS — R31.0 GROSS HEMATURIA: Primary | ICD-10-CM

## 2022-10-12 DIAGNOSIS — Z87.442 HISTORY OF KIDNEY STONES: ICD-10-CM

## 2022-10-12 DIAGNOSIS — R31.0 GROSS HEMATURIA: ICD-10-CM

## 2022-10-12 DIAGNOSIS — N20.0 NEPHROLITHIASIS: Primary | ICD-10-CM

## 2022-10-12 DIAGNOSIS — R30.0 DYSURIA: ICD-10-CM

## 2022-10-12 LAB
ANION GAP SERPL CALC-SCNC: 11 MMOL/L (ref 8–16)
BUN SERPL-MCNC: 11 MG/DL (ref 6–20)
CALCIUM SERPL-MCNC: 9.8 MG/DL (ref 8.7–10.5)
CHLORIDE SERPL-SCNC: 103 MMOL/L (ref 95–110)
CO2 SERPL-SCNC: 25 MMOL/L (ref 23–29)
CREAT SERPL-MCNC: 0.9 MG/DL (ref 0.5–1.4)
EST. GFR  (NO RACE VARIABLE): >60 ML/MIN/1.73 M^2
GLUCOSE SERPL-MCNC: 88 MG/DL (ref 70–110)
POTASSIUM SERPL-SCNC: 4.1 MMOL/L (ref 3.5–5.1)
SODIUM SERPL-SCNC: 139 MMOL/L (ref 136–145)

## 2022-10-12 PROCEDURE — 3044F PR MOST RECENT HEMOGLOBIN A1C LEVEL <7.0%: ICD-10-PCS | Mod: CPTII,S$GLB,, | Performed by: UROLOGY

## 2022-10-12 PROCEDURE — 1159F PR MEDICATION LIST DOCUMENTED IN MEDICAL RECORD: ICD-10-PCS | Mod: CPTII,S$GLB,, | Performed by: UROLOGY

## 2022-10-12 PROCEDURE — 87086 URINE CULTURE/COLONY COUNT: CPT | Performed by: UROLOGY

## 2022-10-12 PROCEDURE — 3077F PR MOST RECENT SYSTOLIC BLOOD PRESSURE >= 140 MM HG: ICD-10-PCS | Mod: CPTII,S$GLB,, | Performed by: UROLOGY

## 2022-10-12 PROCEDURE — 3008F PR BODY MASS INDEX (BMI) DOCUMENTED: ICD-10-PCS | Mod: CPTII,S$GLB,, | Performed by: UROLOGY

## 2022-10-12 PROCEDURE — 99204 PR OFFICE/OUTPT VISIT, NEW, LEVL IV, 45-59 MIN: ICD-10-PCS | Mod: S$GLB,,, | Performed by: UROLOGY

## 2022-10-12 PROCEDURE — 36415 COLL VENOUS BLD VENIPUNCTURE: CPT | Performed by: UROLOGY

## 2022-10-12 PROCEDURE — 99204 OFFICE O/P NEW MOD 45 MIN: CPT | Mod: S$GLB,,, | Performed by: UROLOGY

## 2022-10-12 PROCEDURE — 3080F PR MOST RECENT DIASTOLIC BLOOD PRESSURE >= 90 MM HG: ICD-10-PCS | Mod: CPTII,S$GLB,, | Performed by: UROLOGY

## 2022-10-12 PROCEDURE — 1159F MED LIST DOCD IN RCRD: CPT | Mod: CPTII,S$GLB,, | Performed by: UROLOGY

## 2022-10-12 PROCEDURE — 3080F DIAST BP >= 90 MM HG: CPT | Mod: CPTII,S$GLB,, | Performed by: UROLOGY

## 2022-10-12 PROCEDURE — 3044F HG A1C LEVEL LT 7.0%: CPT | Mod: CPTII,S$GLB,, | Performed by: UROLOGY

## 2022-10-12 PROCEDURE — 99999 PR PBB SHADOW E&M-EST. PATIENT-LVL III: ICD-10-PCS | Mod: PBBFAC,,, | Performed by: UROLOGY

## 2022-10-12 PROCEDURE — 3008F BODY MASS INDEX DOCD: CPT | Mod: CPTII,S$GLB,, | Performed by: UROLOGY

## 2022-10-12 PROCEDURE — 3077F SYST BP >= 140 MM HG: CPT | Mod: CPTII,S$GLB,, | Performed by: UROLOGY

## 2022-10-12 PROCEDURE — 99999 PR PBB SHADOW E&M-EST. PATIENT-LVL III: CPT | Mod: PBBFAC,,, | Performed by: UROLOGY

## 2022-10-12 PROCEDURE — 80048 BASIC METABOLIC PNL TOTAL CA: CPT | Performed by: UROLOGY

## 2022-10-12 NOTE — PROGRESS NOTES
[facility] - Urology   Clinic Note    SUBJECTIVE:     Chief Complaint: right flank pain    Referral from: Self, Aaareferral.    History of Present Illness:  Dong Denny is a 32 y.o. male who presents to clinic for right flank pain.    Patient presents with right flank and abdominal pain that started few weeks ago.  Patient has a history of kidney stones and believes he may have passed a stone recently.  He says he has had 3 stones in his life.  He has a strong family history of kidney stones.  He describes the pain is colicky and intermittent.  He also endorses pelvic pain and dysuria at the end of voiding.  Reports that stream is strong he does have increased frequency.  Denies any fevers or chills.  Additionally is on testosterone replacement therapy which is [facility].    Patient endorses no additional complaints at this time.    Past Medical History:   Diagnosis Date    Allergic rhinitis     Androgenetic alopecia     Back pain with sciatica     Related to strength training in past. Resolved.    Decreased libido     Dyslipidemia     Fatigue     Kidney stones     Low testosterone        Past Surgical History:   Procedure Laterality Date    TONSILLECTOMY      TURBINATE RESECTION Bilateral ,     done twice    TYMPANOSTOMY TUBE PLACEMENT Bilateral        Family History   Problem Relation Age of Onset    No Known Problems Mother     Diabetes Father        Social History     Tobacco Use    Smoking status: Never    Smokeless tobacco: Never   Substance Use Topics    Alcohol use: Yes     Comment: socially    Drug use: No       Current Outpatient Medications on File Prior to Visit   Medication Sig Dispense Refill    chorionic gonadotropin, human (PREGNYL) 10,000 unit injection SMARTSI Unit(s) SUB-Q Twice a Week      clobetasoL (TEMOVATE) 0.05 % external solution Apply topically.      doxycycline monohydrate 100 mg Tab Take 1 tablet by mouth daily as needed.    "   dutasteride (AVODART) 0.5 mg capsule Take 0.5 mg by mouth once daily.      fluticasone propionate (FLONASE) 50 mcg/actuation nasal spray 1 spray (50 mcg total) by Each Nostril route once daily. 16 g 6    ketoconazole (NIZORAL) 2 % shampoo Apply topically.      montelukast (SINGULAIR) 10 mg tablet TAKE ONE TABLET BY MOUTH ONE TIME DAILY 90 tablet 0     No current facility-administered medications on file prior to visit.       Review of patient's allergies indicates:  No Known Allergies    Review of Systems:  A review of 10+ systems was conducted with pertinent positive and negative findings documented in HPI with all other systems reviewed and negative.    OBJECTIVE:     Estimated body mass index is 23.42 kg/m² as calculated from the following:    Height as of this encounter: 5' 9" (1.753 m).    Weight as of this encounter: 72 kg (158 lb 9.9 oz).    Vital Signs (Most Recent)  Vitals:    10/12/22 0946   BP: (!) 147/90   Pulse: 65       Physical Exam:  GENERAL: patient sitting comfortably  HEENT: normocephalic  NECK: supple, no JVD  PULM: normal chest rise, no increased WOB  HEART: non-diaphoretic  ABDO: soft, nondistended, nontender  BACK: no CVA tenderness bilaterally  SKIN: warm, dry, well perfused  EXT: no bruising or edema  NEURO: grossly normal with no focal deficits  PSYCH: appropriate mood and affect    Genitourinary Exam:  deferred    Lab Results   Component Value Date    BUN 11 10/04/2022    CREATININE 1.1 10/04/2022    WBC 4.10 10/04/2022    HGB 16.6 10/04/2022    HCT 48.0 10/04/2022     10/04/2022    AST 19 10/04/2022    ALT 28 10/04/2022    ALKPHOS 43 (L) 10/04/2022    ALBUMIN 4.5 10/04/2022    HGBA1C 4.9 10/04/2022          ASSESSMENT     1. Gross hematuria    2. History of kidney stones    3. Dysuria        PLAN:     Gross Hematuria     - He has gross hematuria.    I counseled the patient on the AUA Guidelines for a hematuria workup. An evaluation is recommend on all patients with gross " hematuria. The goal of upper tract imaging in patients is to identify malignancies of the renal parenchyma and upper tract urothelium, as well as to identify actionable non-malignant diagnoses of the kidney, collecting system, and ureters. The choice of imaging modality involves tradeoffs between diagnostic accuracy versus risk. The evaluation also involves a flexible cystoscopy performed in the clinic. A urine cytology may also be sent at the time of cystoscopy.    - Plan for flexible cystoscopy, urine cytology, and CT urogram. Will also send urine culture. If stones are found on CT or there is an obvious source of hematuria we can adjust our plan.    2.   History of Kidney Stones    - will plan for 24 hour urine in the future.  Will evaluate for stones with CT scan    3.   Dysuria    - sending a urine culture now although UA was negative.  We can discuss management options for this if there is no obvious source on the rest of his workup    Siva Leyva MD  Urology  [facility]    Disclaimer: This note has been generated using voice-recognition software. There may be typographical errors that have been missed during proof-reading.

## 2022-10-13 LAB — BACTERIA UR CULT: NO GROWTH

## 2022-10-14 ENCOUNTER — HOSPITAL ENCOUNTER (OUTPATIENT)
Dept: RADIOLOGY | Facility: HOSPITAL | Age: 32
Discharge: HOME | End: 2022-10-14
Attending: UROLOGY
Payer: COMMERCIAL

## 2022-10-14 ENCOUNTER — PATIENT MESSAGE (OUTPATIENT)
Dept: UROLOGY | Facility: CLINIC | Age: 32
End: 2022-10-14
Payer: COMMERCIAL

## 2022-10-14 DIAGNOSIS — N20.0 NEPHROLITHIASIS: Primary | ICD-10-CM

## 2022-10-14 DIAGNOSIS — R31.0 GROSS HEMATURIA: ICD-10-CM

## 2022-10-14 PROCEDURE — 25500020 PHARM REV CODE 255: Performed by: UROLOGY

## 2022-10-14 PROCEDURE — 74178 CT ABD&PLV WO CNTR FLWD CNTR: CPT | Mod: 26,,, | Performed by: INTERNAL MEDICINE

## 2022-10-14 PROCEDURE — 74178 CT UROGRAM ABD PELVIS W WO: ICD-10-PCS | Mod: 26,,, | Performed by: INTERNAL MEDICINE

## 2022-10-14 PROCEDURE — 74178 CT ABD&PLV WO CNTR FLWD CNTR: CPT | Mod: TC

## 2022-10-14 RX ADMIN — IOHEXOL 150 ML: 350 INJECTION, SOLUTION INTRAVENOUS at 12:10

## 2022-10-17 ENCOUNTER — LAB VISIT (OUTPATIENT)
Dept: LAB | Facility: HOSPITAL | Age: 32
End: 2022-10-17
Attending: UROLOGY
Payer: COMMERCIAL

## 2022-10-17 DIAGNOSIS — N20.0 NEPHROLITHIASIS: ICD-10-CM

## 2022-10-17 PROCEDURE — 82365 CALCULUS SPECTROSCOPY: CPT | Performed by: UROLOGY

## 2022-10-24 LAB
COMPN STONE: NORMAL
SPECIMEN SOURCE: NORMAL
STONE ANALYSIS IR-IMP: NORMAL

## 2022-10-26 ENCOUNTER — OFFICE VISIT (OUTPATIENT)
Dept: UROLOGY | Facility: CLINIC | Age: 32
End: 2022-10-26
Payer: COMMERCIAL

## 2022-10-26 VITALS
WEIGHT: 161.38 LBS | DIASTOLIC BLOOD PRESSURE: 76 MMHG | HEIGHT: 69 IN | HEART RATE: 74 BPM | BODY MASS INDEX: 23.9 KG/M2 | SYSTOLIC BLOOD PRESSURE: 118 MMHG

## 2022-10-26 DIAGNOSIS — R31.0 GROSS HEMATURIA: ICD-10-CM

## 2022-10-26 DIAGNOSIS — N20.0 NEPHROLITHIASIS: Primary | ICD-10-CM

## 2022-10-26 PROCEDURE — 99213 OFFICE O/P EST LOW 20 MIN: CPT | Mod: S$GLB,,, | Performed by: UROLOGY

## 2022-10-26 PROCEDURE — 3078F DIAST BP <80 MM HG: CPT | Mod: CPTII,S$GLB,, | Performed by: UROLOGY

## 2022-10-26 PROCEDURE — 3078F PR MOST RECENT DIASTOLIC BLOOD PRESSURE < 80 MM HG: ICD-10-PCS | Mod: CPTII,S$GLB,, | Performed by: UROLOGY

## 2022-10-26 PROCEDURE — 99213 PR OFFICE/OUTPT VISIT, EST, LEVL III, 20-29 MIN: ICD-10-PCS | Mod: S$GLB,,, | Performed by: UROLOGY

## 2022-10-26 PROCEDURE — 3044F HG A1C LEVEL LT 7.0%: CPT | Mod: CPTII,S$GLB,, | Performed by: UROLOGY

## 2022-10-26 PROCEDURE — 3044F PR MOST RECENT HEMOGLOBIN A1C LEVEL <7.0%: ICD-10-PCS | Mod: CPTII,S$GLB,, | Performed by: UROLOGY

## 2022-10-26 PROCEDURE — 3074F PR MOST RECENT SYSTOLIC BLOOD PRESSURE < 130 MM HG: ICD-10-PCS | Mod: CPTII,S$GLB,, | Performed by: UROLOGY

## 2022-10-26 PROCEDURE — 99999 PR PBB SHADOW E&M-EST. PATIENT-LVL III: ICD-10-PCS | Mod: PBBFAC,,, | Performed by: UROLOGY

## 2022-10-26 PROCEDURE — 99999 PR PBB SHADOW E&M-EST. PATIENT-LVL III: CPT | Mod: PBBFAC,,, | Performed by: UROLOGY

## 2022-10-26 PROCEDURE — 1159F MED LIST DOCD IN RCRD: CPT | Mod: CPTII,S$GLB,, | Performed by: UROLOGY

## 2022-10-26 PROCEDURE — 3074F SYST BP LT 130 MM HG: CPT | Mod: CPTII,S$GLB,, | Performed by: UROLOGY

## 2022-10-26 PROCEDURE — 1159F PR MEDICATION LIST DOCUMENTED IN MEDICAL RECORD: ICD-10-PCS | Mod: CPTII,S$GLB,, | Performed by: UROLOGY

## 2022-10-26 NOTE — PROGRESS NOTES
[facility]   Clinic Note    SUBJECTIVE:     Chief Complaint: right flank pain    Referral from: Siva Leyva MD.    History of Present Illness:  Dong Denny is a 32 y.o. male who presents to clinic for right flank pain.    Patient presents with right flank and abdominal pain that started few weeks ago.  Patient has a history of kidney stones and believes he may have passed a stone recently.  He says he has had 3 stones in his life.  He has a strong family history of kidney stones.  He describes the pain is colicky and intermittent.  He also endorses pelvic pain and dysuria at the end of voiding.  Reports that stream is strong he does have increased frequency.  Denies any fevers or chills.  Additionally is on testosterone replacement therapy which is managed by an outside [facility].    10/26/2022  Patient presents today feeling much better than last time.  He denies any abdominal pain.  Denies any gross hematuria.  He passed a stone 2 hours after seeing me which he brought in for stone analysis which is 100% calcium oxalate dihydrate.  No fevers or chills.  No UTI symptoms.    Patient endorses no additional complaints at this time.    Past Medical History:   Diagnosis Date    Allergic rhinitis     Androgenetic alopecia     Back pain with sciatica     Related to strength training in past. Resolved.    Decreased libido     Dyslipidemia 2022    Fatigue     Kidney stones     Low testosterone        Past Surgical History:   Procedure Laterality Date    TONSILLECTOMY      TURBINATE RESECTION Bilateral 2010, 2006    done twice    TYMPANOSTOMY TUBE PLACEMENT Bilateral 1990       Family History   Problem Relation Age of Onset    No Known Problems Mother     Diabetes Father        Social History     Tobacco Use    Smoking status: Never    Smokeless tobacco: Never   Substance Use Topics    Alcohol use: Yes     Comment: socially    Drug use: No       Current Outpatient Medications on File  "Prior to Visit   Medication Sig Dispense Refill    chorionic gonadotropin, human (PREGNYL) 10,000 unit injection SMARTSI Unit(s) SUB-Q Twice a Week      clobetasoL (TEMOVATE) 0.05 % external solution Apply topically.      doxycycline monohydrate 100 mg Tab Take 1 tablet by mouth daily as needed.      dutasteride (AVODART) 0.5 mg capsule Take 0.5 mg by mouth once daily.      fluticasone propionate (FLONASE) 50 mcg/actuation nasal spray 1 spray (50 mcg total) by Each Nostril route once daily. 16 g 6    ketoconazole (NIZORAL) 2 % shampoo Apply topically.      montelukast (SINGULAIR) 10 mg tablet TAKE ONE TABLET BY MOUTH ONE TIME DAILY 90 tablet 0     No current facility-administered medications on file prior to visit.       Review of patient's allergies indicates:  No Known Allergies    Review of Systems:  A review of 10+ systems was conducted with pertinent positive and negative findings documented in HPI with all other systems reviewed and negative.    OBJECTIVE:     Estimated body mass index is 23.83 kg/m² as calculated from the following:    Height as of this encounter: 5' 9" (1.753 m).    Weight as of this encounter: 73.2 kg (161 lb 6 oz).    Vital Signs (Most Recent)  Vitals:    10/26/22 1300   BP: 118/76   Pulse: 74       Physical Exam:  GENERAL: patient sitting comfortably  HEENT: normocephalic  NECK: supple, no JVD  PULM: normal chest rise, no increased WOB  HEART: non-diaphoretic  ABDO: soft, nondistended, nontender  BACK: no CVA tenderness bilaterally  SKIN: warm, dry, well perfused  EXT: no bruising or edema  NEURO: grossly normal with no focal deficits  PSYCH: appropriate mood and affect    Genitourinary Exam:  deferred    Lab Results   Component Value Date    BUN 11 10/12/2022    CREATININE 0.9 10/12/2022    WBC 4.10 10/04/2022    HGB 16.6 10/04/2022    HCT 48.0 10/04/2022     10/04/2022    AST 19 10/04/2022    ALT 28 10/04/2022    ALKPHOS 43 (L) 10/04/2022    ALBUMIN 4.5 10/04/2022    HGBA1C " 4.9 10/04/2022      Imaging:  I have personally reviewed the below imaging and discussed my findings with the patient.     Results for orders placed or performed during the hospital encounter of 10/14/22 (from the past 2160 hour(s))   CT Urogram Abd Pelvis W WO    Impression    Bilateral nonobstructing renal calculi    Several bilateral small renal hypodensities which are too small to characterize but likely represent cysts.      Electronically signed by: Suze Huggins MD  Date:    10/14/2022  Time:    13:17     No results found for this or any previous visit (from the past 2160 hour(s)).  CT Urogram Abd Pelvis W WO  Narrative: EXAMINATION:  CT UROGRAM ABD PELVIS W WO    CLINICAL HISTORY:  Hematuria, gross/macroscopic;Gross hematuria    TECHNIQUE:  Low dose axial, sagittal and coronal reformations were obtained from the lung bases to the pubic symphysis before and following the IV administration of 150 mL of Omnipaque 350.  Timing was optimized for parenchymal and excretory renal phases.    COMPARISON:  None    FINDINGS:  The right kidney demonstrates 3 small calculi.  The left kidney demonstrates 4 calculi, the largest 5 mm.    The lung bases demonstrate no significant abnormality.    The liver demonstrates no focal abnormality.  The spleen is not enlarged.    The stomach, pancreas and adrenal glands are unremarkable.    Gallbladder is within normal limits in the common duct is not dilated.    Kidneys concentrate and excrete contrast satisfactorily.  There are several hypodensities within the kidneys bilaterally, too small to characterize but likely representing cysts.  There is no hydronephrosis and the ureters are normal in course and caliber.    The aorta tapers normally without adjacent lymphadenopathy.  There is no pelvic lymphadenopathy.    The bladder is only partially distended appearing grossly unremarkable.    The bowel demonstrates no significant abnormality.  Impression: Bilateral nonobstructing renal  calculi    Several bilateral small renal hypodensities which are too small to characterize but likely represent cysts.    Electronically signed by: Suze Huggins MD  Date:    10/14/2022  Time:    13:17         ASSESSMENT     1. Nephrolithiasis    2. Gross hematuria        PLAN:     Gross Hematuria     - we discussed options.  Patient does not need cystoscopy at this time as he is low risk and the cause of his hematuria was likely the passing stone.  If hematuria recurs we will reconsider performing a cystoscopy.    2.   Bilateral Nephrolithiasis    - plan for 24 hour urine study, renal ultrasound, and KUB in 2 months.  Will follow-up with me in 3 months.    - stones at present time are small, only 1 appears like it is in the collecting system, the rest appeared to be within the parenchyma of the kidney and all are nonobstructing.  We will observe these.    Siva Leyva MD  Urology  [facility]    Disclaimer: This note has been generated using voice-recognition software. There may be typographical errors that have been missed during proof-reading.

## 2023-08-07 DIAGNOSIS — Z00.00 ROUTINE MEDICAL EXAM: Primary | ICD-10-CM

## 2023-09-22 ENCOUNTER — OFFICE VISIT (OUTPATIENT)
Dept: INTERNAL MEDICINE | Facility: CLINIC | Age: 33
End: 2023-09-22
Payer: COMMERCIAL

## 2023-09-22 ENCOUNTER — HOSPITAL ENCOUNTER (OUTPATIENT)
Dept: RADIOLOGY | Facility: HOSPITAL | Age: 33
Discharge: HOME | End: 2023-09-22
Attending: STUDENT IN AN ORGANIZED HEALTH CARE EDUCATION/TRAINING PROGRAM
Payer: COMMERCIAL

## 2023-09-22 ENCOUNTER — CLINICAL SUPPORT (OUTPATIENT)
Dept: INTERNAL MEDICINE | Facility: CLINIC | Age: 33
End: 2023-09-22
Payer: COMMERCIAL

## 2023-09-22 ENCOUNTER — HOSPITAL ENCOUNTER (OUTPATIENT)
Dept: CARDIOLOGY | Facility: CLINIC | Age: 33
Discharge: HOME | End: 2023-09-22
Payer: COMMERCIAL

## 2023-09-22 VITALS
WEIGHT: 168.19 LBS | DIASTOLIC BLOOD PRESSURE: 76 MMHG | SYSTOLIC BLOOD PRESSURE: 124 MMHG | HEIGHT: 69 IN | BODY MASS INDEX: 24.91 KG/M2 | HEART RATE: 66 BPM | OXYGEN SATURATION: 100 %

## 2023-09-22 DIAGNOSIS — G89.29 CHRONIC LEFT-SIDED LOW BACK PAIN WITHOUT SCIATICA: ICD-10-CM

## 2023-09-22 DIAGNOSIS — Z00.00 ROUTINE MEDICAL EXAM: ICD-10-CM

## 2023-09-22 DIAGNOSIS — D58.2 ELEVATED HEMOGLOBIN: ICD-10-CM

## 2023-09-22 DIAGNOSIS — E34.9 TESTOSTERONE DEFICIENCY: ICD-10-CM

## 2023-09-22 DIAGNOSIS — Z00.00 HEALTH MAINTENANCE EXAMINATION: ICD-10-CM

## 2023-09-22 DIAGNOSIS — Z00.00 ANNUAL PHYSICAL EXAM: Primary | ICD-10-CM

## 2023-09-22 DIAGNOSIS — E78.5 DYSLIPIDEMIA: ICD-10-CM

## 2023-09-22 DIAGNOSIS — J31.0 CHRONIC RHINITIS: ICD-10-CM

## 2023-09-22 DIAGNOSIS — M54.50 CHRONIC LEFT-SIDED LOW BACK PAIN WITHOUT SCIATICA: ICD-10-CM

## 2023-09-22 DIAGNOSIS — Z23 NEED FOR VACCINATION: ICD-10-CM

## 2023-09-22 DIAGNOSIS — R74.01 ELEVATED ALT MEASUREMENT: ICD-10-CM

## 2023-09-22 DIAGNOSIS — E78.1 HYPERTRIGLYCERIDEMIA: ICD-10-CM

## 2023-09-22 LAB
ALBUMIN SERPL BCP-MCNC: 4.6 G/DL (ref 3.5–5.2)
ALP SERPL-CCNC: 46 U/L (ref 55–135)
ALT SERPL W/O P-5'-P-CCNC: 47 U/L (ref 10–44)
ANION GAP SERPL CALC-SCNC: 7 MMOL/L (ref 8–16)
AST SERPL-CCNC: 17 U/L (ref 10–40)
BILIRUB SERPL-MCNC: 0.5 MG/DL (ref 0.1–1)
BUN SERPL-MCNC: 13 MG/DL (ref 6–20)
CALCIUM SERPL-MCNC: 10.3 MG/DL (ref 8.7–10.5)
CHLORIDE SERPL-SCNC: 104 MMOL/L (ref 95–110)
CHOLEST SERPL-MCNC: 187 MG/DL (ref 120–199)
CHOLEST/HDLC SERPL: 7.8 {RATIO} (ref 2–5)
CO2 SERPL-SCNC: 31 MMOL/L (ref 23–29)
CREAT SERPL-MCNC: 1.2 MG/DL (ref 0.5–1.4)
ERYTHROCYTE [DISTWIDTH] IN BLOOD BY AUTOMATED COUNT: 12.5 % (ref 11.5–14.5)
EST. GFR  (NO RACE VARIABLE): >60 ML/MIN/1.73 M^2
ESTIMATED AVG GLUCOSE: 97 MG/DL (ref 68–131)
GLUCOSE SERPL-MCNC: 112 MG/DL (ref 70–110)
HBA1C MFR BLD: 5 % (ref 4–5.6)
HCT VFR BLD AUTO: 53.9 % (ref 40–54)
HCV AB SERPL QL IA: NORMAL
HDLC SERPL-MCNC: 24 MG/DL (ref 40–75)
HDLC SERPL: 12.8 % (ref 20–50)
HGB BLD-MCNC: 18.4 G/DL (ref 14–18)
LDLC SERPL CALC-MCNC: 106.8 MG/DL (ref 63–159)
MCH RBC QN AUTO: 31 PG (ref 27–31)
MCHC RBC AUTO-ENTMCNC: 34.1 G/DL (ref 32–36)
MCV RBC AUTO: 91 FL (ref 82–98)
NONHDLC SERPL-MCNC: 163 MG/DL
PLATELET # BLD AUTO: 292 K/UL (ref 150–450)
PMV BLD AUTO: 9.6 FL (ref 9.2–12.9)
POTASSIUM SERPL-SCNC: 4.8 MMOL/L (ref 3.5–5.1)
PROT SERPL-MCNC: 7.5 G/DL (ref 6–8.4)
RBC # BLD AUTO: 5.94 M/UL (ref 4.6–6.2)
SODIUM SERPL-SCNC: 142 MMOL/L (ref 136–145)
TRIGL SERPL-MCNC: 281 MG/DL (ref 30–150)
WBC # BLD AUTO: 4.55 K/UL (ref 3.9–12.7)

## 2023-09-22 PROCEDURE — 72100 XR LUMBAR SPINE 2 OR 3 VIEWS: ICD-10-PCS | Mod: 26,,, | Performed by: RADIOLOGY

## 2023-09-22 PROCEDURE — 3044F HG A1C LEVEL LT 7.0%: CPT | Mod: CPTII,S$GLB,, | Performed by: STUDENT IN AN ORGANIZED HEALTH CARE EDUCATION/TRAINING PROGRAM

## 2023-09-22 PROCEDURE — 3074F SYST BP LT 130 MM HG: CPT | Mod: CPTII,S$GLB,, | Performed by: STUDENT IN AN ORGANIZED HEALTH CARE EDUCATION/TRAINING PROGRAM

## 2023-09-22 PROCEDURE — 93005 ELECTROCARDIOGRAM TRACING: CPT | Mod: S$GLB,,, | Performed by: INTERNAL MEDICINE

## 2023-09-22 PROCEDURE — 80061 LIPID PANEL: CPT | Performed by: STUDENT IN AN ORGANIZED HEALTH CARE EDUCATION/TRAINING PROGRAM

## 2023-09-22 PROCEDURE — 83036 HEMOGLOBIN GLYCOSYLATED A1C: CPT | Performed by: STUDENT IN AN ORGANIZED HEALTH CARE EDUCATION/TRAINING PROGRAM

## 2023-09-22 PROCEDURE — 93005 EKG 12-LEAD: ICD-10-PCS | Mod: S$GLB,,, | Performed by: INTERNAL MEDICINE

## 2023-09-22 PROCEDURE — 72220 XR SACRUM AND COCCYX: ICD-10-PCS | Mod: 26,,, | Performed by: RADIOLOGY

## 2023-09-22 PROCEDURE — 1160F RVW MEDS BY RX/DR IN RCRD: CPT | Mod: CPTII,S$GLB,, | Performed by: STUDENT IN AN ORGANIZED HEALTH CARE EDUCATION/TRAINING PROGRAM

## 2023-09-22 PROCEDURE — 93010 ELECTROCARDIOGRAM REPORT: CPT | Mod: S$GLB,,, | Performed by: INTERNAL MEDICINE

## 2023-09-22 PROCEDURE — 80053 COMPREHEN METABOLIC PANEL: CPT | Performed by: STUDENT IN AN ORGANIZED HEALTH CARE EDUCATION/TRAINING PROGRAM

## 2023-09-22 PROCEDURE — 72100 X-RAY EXAM L-S SPINE 2/3 VWS: CPT | Mod: 26,,, | Performed by: RADIOLOGY

## 2023-09-22 PROCEDURE — 93010 EKG 12-LEAD: ICD-10-PCS | Mod: S$GLB,,, | Performed by: INTERNAL MEDICINE

## 2023-09-22 PROCEDURE — 86803 HEPATITIS C AB TEST: CPT | Performed by: STUDENT IN AN ORGANIZED HEALTH CARE EDUCATION/TRAINING PROGRAM

## 2023-09-22 PROCEDURE — 3074F PR MOST RECENT SYSTOLIC BLOOD PRESSURE < 130 MM HG: ICD-10-PCS | Mod: CPTII,S$GLB,, | Performed by: STUDENT IN AN ORGANIZED HEALTH CARE EDUCATION/TRAINING PROGRAM

## 2023-09-22 PROCEDURE — G0008 FLU VACCINE (QUAD) GREATER THAN OR EQUAL TO 3YO PRESERVATIVE FREE IM: ICD-10-PCS | Mod: S$GLB,,, | Performed by: STUDENT IN AN ORGANIZED HEALTH CARE EDUCATION/TRAINING PROGRAM

## 2023-09-22 PROCEDURE — 1159F MED LIST DOCD IN RCRD: CPT | Mod: CPTII,S$GLB,, | Performed by: STUDENT IN AN ORGANIZED HEALTH CARE EDUCATION/TRAINING PROGRAM

## 2023-09-22 PROCEDURE — 99395 PR PREVENTIVE VISIT,EST,18-39: ICD-10-PCS | Mod: 25,S$GLB,, | Performed by: STUDENT IN AN ORGANIZED HEALTH CARE EDUCATION/TRAINING PROGRAM

## 2023-09-22 PROCEDURE — 99999 PR PBB SHADOW E&M-EST. PATIENT-LVL V: ICD-10-PCS | Mod: PBBFAC,,, | Performed by: STUDENT IN AN ORGANIZED HEALTH CARE EDUCATION/TRAINING PROGRAM

## 2023-09-22 PROCEDURE — 72100 X-RAY EXAM L-S SPINE 2/3 VWS: CPT | Mod: TC

## 2023-09-22 PROCEDURE — 3008F BODY MASS INDEX DOCD: CPT | Mod: CPTII,S$GLB,, | Performed by: STUDENT IN AN ORGANIZED HEALTH CARE EDUCATION/TRAINING PROGRAM

## 2023-09-22 PROCEDURE — G0008 ADMIN INFLUENZA VIRUS VAC: HCPCS | Mod: S$GLB,,, | Performed by: STUDENT IN AN ORGANIZED HEALTH CARE EDUCATION/TRAINING PROGRAM

## 2023-09-22 PROCEDURE — 3044F PR MOST RECENT HEMOGLOBIN A1C LEVEL <7.0%: ICD-10-PCS | Mod: CPTII,S$GLB,, | Performed by: STUDENT IN AN ORGANIZED HEALTH CARE EDUCATION/TRAINING PROGRAM

## 2023-09-22 PROCEDURE — 99999 PR PBB SHADOW E&M-EST. PATIENT-LVL V: CPT | Mod: PBBFAC,,, | Performed by: STUDENT IN AN ORGANIZED HEALTH CARE EDUCATION/TRAINING PROGRAM

## 2023-09-22 PROCEDURE — 3078F PR MOST RECENT DIASTOLIC BLOOD PRESSURE < 80 MM HG: ICD-10-PCS | Mod: CPTII,S$GLB,, | Performed by: STUDENT IN AN ORGANIZED HEALTH CARE EDUCATION/TRAINING PROGRAM

## 2023-09-22 PROCEDURE — 85027 COMPLETE CBC AUTOMATED: CPT | Performed by: STUDENT IN AN ORGANIZED HEALTH CARE EDUCATION/TRAINING PROGRAM

## 2023-09-22 PROCEDURE — 1160F PR REVIEW ALL MEDS BY PRESCRIBER/CLIN PHARMACIST DOCUMENTED: ICD-10-PCS | Mod: CPTII,S$GLB,, | Performed by: STUDENT IN AN ORGANIZED HEALTH CARE EDUCATION/TRAINING PROGRAM

## 2023-09-22 PROCEDURE — 1159F PR MEDICATION LIST DOCUMENTED IN MEDICAL RECORD: ICD-10-PCS | Mod: CPTII,S$GLB,, | Performed by: STUDENT IN AN ORGANIZED HEALTH CARE EDUCATION/TRAINING PROGRAM

## 2023-09-22 PROCEDURE — 99395 PREV VISIT EST AGE 18-39: CPT | Mod: 25,S$GLB,, | Performed by: STUDENT IN AN ORGANIZED HEALTH CARE EDUCATION/TRAINING PROGRAM

## 2023-09-22 PROCEDURE — 90686 FLU VACCINE (QUAD) GREATER THAN OR EQUAL TO 3YO PRESERVATIVE FREE IM: ICD-10-PCS | Mod: S$GLB,,, | Performed by: STUDENT IN AN ORGANIZED HEALTH CARE EDUCATION/TRAINING PROGRAM

## 2023-09-22 PROCEDURE — 3008F PR BODY MASS INDEX (BMI) DOCUMENTED: ICD-10-PCS | Mod: CPTII,S$GLB,, | Performed by: STUDENT IN AN ORGANIZED HEALTH CARE EDUCATION/TRAINING PROGRAM

## 2023-09-22 PROCEDURE — 72220 X-RAY EXAM SACRUM TAILBONE: CPT | Mod: TC

## 2023-09-22 PROCEDURE — 3078F DIAST BP <80 MM HG: CPT | Mod: CPTII,S$GLB,, | Performed by: STUDENT IN AN ORGANIZED HEALTH CARE EDUCATION/TRAINING PROGRAM

## 2023-09-22 PROCEDURE — 90686 IIV4 VACC NO PRSV 0.5 ML IM: CPT | Mod: S$GLB,,, | Performed by: STUDENT IN AN ORGANIZED HEALTH CARE EDUCATION/TRAINING PROGRAM

## 2023-09-22 PROCEDURE — 72220 X-RAY EXAM SACRUM TAILBONE: CPT | Mod: 26,,, | Performed by: RADIOLOGY

## 2023-09-22 RX ORDER — ANASTROZOLE 1 MG/1
1 TABLET ORAL
COMMUNITY
Start: 2023-08-21

## 2023-09-22 RX ORDER — TESTOSTERONE CYPIONATE 200 MG/ML
INJECTION, SOLUTION INTRAMUSCULAR
COMMUNITY

## 2023-09-22 NOTE — LETTER
September 22, 2023    Dong Denny  [address] [place] [address]             [facility]  [address]  [place] [address]  Phone: [phone]  Fax: [phone] Dear Mr. Denny:      It was a pleasure seeing you in clinic for your Executive Health exam on 09/22/2023. Enclosed is a copy of the clinic note detailing the history, physical exam findings, laboratory studies, and recommendations at that time.  Thank you for allowing me to participate in your medical care.        If you have any questions or concerns, please don't hesitate to call.    Sincerely,        Aimee Moe MD     
     September 25, 2023    Dong Denny  [address] [place] [address]             [facility]  [address]  [place] [address]  Phone: [phone]  Fax: [phone] Dear Mr. Denny:      It was a pleasure seeing you in clinic for your Executive Health exam on 09/22/2023. Enclosed is a copy of the clinic note detailing the history, physical exam findings, laboratory studies, and recommendations at that time.  Thank you for allowing me to participate in your medical care.        If you have any questions or concerns, please don't hesitate to call.    Sincerely,        Aimee Moe MD     
No

## 2023-09-22 NOTE — PATIENT INSTRUCTIONS
An imaging study as detailed below has been ordered. Please schedule this at check out.     A referral to physical therapy has also been placed. You may also schedule this appointment when you check out today.     Try increasing your omega 3 fish oil dose to 2 capsules daily.

## 2023-09-22 NOTE — PROGRESS NOTES
[facility] EXECUTIVE HEALTH EXAM    CHIEF COMPLAINT: Executive health exam    HISTORY OF PRESENT ILLNESS: Dong Denny is a 32 y.o. male who presents here today for an executive health examination. Patient is an employee of [employer]. 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 He is here with his wife today.     Patient reports lower back pain in left lumbar/sacral region that has been ongoing for the past 6 weeks. He recalls no specific injury or triggering event. It has prevented him from maintaining his regular running routine. Advil has provided minimal relief. He denies any neurologic symptoms. No radiating symptoms.     Patient has a history of chronic sinusitis for which he has had 2 turbinate surgeries. He has taken numerous allergy medications but continues to have issues. Current regimen includes daily flonase and singulair. He would like to be re-evaluated by ENT due to progressive symptoms.     Medical history is otherwise remarkable for low testosterone for which he is taking injectable testosterone, anastrozole, and hcg injection. This is being managed by a specialist. Of note, patient and his wife are currently trying to conceive.       PAST MEDICAL HISTORY:  Past Medical History:   Diagnosis Date    Androgenetic alopecia     Chronic rhinosinusitis     Dyslipidemia     Kidney stones     Low testosterone        MEDICATIONS:    Current Outpatient Medications:     chorionic gonadotropin, human (PREGNYL) 10,000 unit injection, SMARTSI Unit(s) SUB-Q Twice a Week, Disp: , Rfl:     fluticasone propionate (FLONASE) 50 mcg/actuation nasal spray, 1 spray (50 mcg total) by Each Nostril route once daily., Disp: 16 g, Rfl: 6    montelukast (SINGULAIR) 10 mg tablet, TAKE ONE TABLET BY MOUTH ONE TIME DAILY, Disp: 90 tablet, Rfl: 3    testosterone cypionate (DEPOTESTOTERONE CYPIONATE) 200 mg/mL injection, Inject into the muscle every 7 days., Disp: , Rfl:     anastrozole (ARIMIDEX) 1 mg Tab,  "Take 1 mg by mouth., Disp: , Rfl:     PAST SURGICAL HISTORY:  Past Surgical History:   Procedure Laterality Date    TOE SURGERY Left 2015    TONSILLECTOMY      TURBINATE RESECTION Bilateral 2010, 2006    done twice    TYMPANOSTOMY TUBE PLACEMENT Bilateral 1990       SOCIAL HISTORY:  Social History     Tobacco Use    Smoking status: Never    Smokeless tobacco: Never   Substance Use Topics    Alcohol use: Yes     Alcohol/week: 2.0 standard drinks of alcohol     Types: 2 Standard drinks or equivalent per week     Comment: socially    Drug use: No       ALLERGIES:  Review of patient's allergies indicates:  No Known Allergies    FAMILY HISTORY:  Family History   Problem Relation Age of Onset    Celiac disease Mother     Diabetes Father     No Known Problems Sister     No Known Problems Maternal Grandmother     No Known Problems Maternal Grandfather     No Known Problems Paternal Grandmother     No Known Problems Paternal Grandfather        PREVENTATIVE HEALTH:   Immunizations  Influenza: requested  COVID:  yes x 3  Tdap:  next due 2026  Pneumonia: not indicated  Shingles: not indicated  Screenings  Prostate cancer: not indicated  Colon cancer: not indicated  Lung cancer: not indicated  AAA: not indicated  Bone density: not indicated  Depression: denies  Anxiety: denies  Interventions  Aspirin: Not Indicated  Statin: Not Indicated  Diet: "pretty good" tries to cook at home, does meal prep  Exercise: used to run 20 miles per week before back injury, weight lifts 4 days per week      PHYSICAL EXAM:    /76 (BP Location: Right arm, Patient Position: Sitting, BP Method: Medium (Manual))   Pulse 66   Ht 5' 9" (1.753 m)   Wt 76.3 kg (168 lb 3.4 oz)   SpO2 100%   BMI 24.84 kg/m²     Physical Exam  Vitals and nursing note reviewed.   Constitutional:       General: He is not in acute distress.     Appearance: Normal appearance. He is not ill-appearing, toxic-appearing or diaphoretic.   HENT:      Head: Normocephalic and " atraumatic.   Eyes:      Extraocular Movements: Extraocular movements intact.      Conjunctiva/sclera: Conjunctivae normal.      Pupils: Pupils are equal, round, and reactive to light.   Cardiovascular:      Rate and Rhythm: Normal rate and regular rhythm.      Heart sounds: Normal heart sounds. No murmur heard.  Pulmonary:      Effort: Pulmonary effort is normal. No respiratory distress.      Breath sounds: Normal breath sounds.   Musculoskeletal:         General: Normal range of motion.      Cervical back: Neck supple. No tenderness.      Lumbar back: Tenderness present. No deformity or bony tenderness. Normal range of motion.   Lymphadenopathy:      Cervical: No cervical adenopathy.   Skin:     Findings: No lesion or rash.   Neurological:      General: No focal deficit present.      Mental Status: He is alert.      Motor: No weakness.   Psychiatric:         Mood and Affect: Mood normal.         Behavior: Behavior normal.         Thought Content: Thought content normal.         Judgment: Judgment normal.            LAB REVIEW:    Lab Results   Component Value Date     09/22/2023    K 4.8 09/22/2023     09/22/2023    CO2 31 (H) 09/22/2023    BUN 13 09/22/2023    CREATININE 1.2 09/22/2023    CALCIUM 10.3 09/22/2023    ANIONGAP 7 (L) 09/22/2023    EGFRNORACEVR >60.0 09/22/2023       Lab Results   Component Value Date    ALT 47 (H) 09/22/2023    AST 17 09/22/2023    ALKPHOS 46 (L) 09/22/2023    BILITOT 0.5 09/22/2023       Lab Results   Component Value Date    WBC 4.55 09/22/2023    HGB 18.4 (H) 09/22/2023    HCT 53.9 09/22/2023    MCV 91 09/22/2023     09/22/2023       Lab Results   Component Value Date    HGBA1C 5.0 09/22/2023       Cholesterol   Date Value Ref Range Status   09/22/2023 187 120 - 199 mg/dL Final     Comment:     The National Cholesterol Education Program (NCEP) has set the  following guidelines (reference ranges) for Cholesterol:  Optimal.....................<200 mg/dL  Borderline  High.............200-239 mg/dL  High........................> or = 240 mg/dL       HDL   Date Value Ref Range Status   09/22/2023 24 (L) 40 - 75 mg/dL Final     Comment:     The National Cholesterol Education Program (NCEP) has set the  following guidelines (reference values) for HDL Cholesterol:  Low...............<40 mg/dL  Optimal...........>60 mg/dL       LDL Cholesterol   Date Value Ref Range Status   09/22/2023 106.8 63.0 - 159.0 mg/dL Final     Comment:     The National Cholesterol Education Program (NCEP) has set the  following guidelines (reference values) for LDL Cholesterol:  Optimal.......................<130 mg/dL  Borderline High...............130-159 mg/dL  High..........................160-189 mg/dL  Very High.....................>190 mg/dL       Triglycerides   Date Value Ref Range Status   09/22/2023 281 (H) 30 - 150 mg/dL Final     Comment:     The National Cholesterol Education Program (NCEP) has set the  following guidelines (reference values) for triglycerides:  Normal......................<150 mg/dL  Borderline High.............150-199 mg/dL  High........................200-499 mg/dL       Lab Results   Component Value Date    HEPCAB Non-reactive 09/22/2023         IMAGING:    EKG  Results for orders placed or performed during the hospital encounter of 09/22/23   EKG 12-lead    Collection Time: 09/22/23  7:56 AM    Narrative    Test Reason : Z00.00,    Vent. Rate : 066 BPM     Atrial Rate : 066 BPM     P-R Int : 166 ms          QRS Dur : 090 ms      QT Int : 354 ms       P-R-T Axes : 008 064 019 degrees     QTc Int : 371 ms    Normal sinus rhythm  Within normal limits    When compared with ECG of 04-OCT-2022 09:35,  No significant change was found  Confirmed by Ramy ALONSO, Gera ([id number]) on 9/22/2023 11:27:41 AM    Referred By: KAYLEIGH DOUGLASS           Confirmed By:Gera Mccann MD           ASSESSMENT & PLAN:    Dong was seen today for [facility] health.    Diagnoses and all orders for this  visit:    Annual physical exam  Health maintenance examination  Findings, health screenings, and immunizations as below  History, physical exam findings, lab results, and imaging results are all acceptable with exception of what is detailed below    Chronic left-sided low back pain without sciatica  Patient reporting 6+ weeks of left sided lumbar/sacral pain. No specific injury or preceding event. No neurologic or red flag symptoms. Pain has prevented him from maintaining regular level of physical activity.   Further evaluation with XR, and if unremarkable management per PT  -     X-Ray Lumbar Spine 2 Or 3 Views; Future; Expected date: 09/22/2023  -     Ambulatory referral/consult to Physical/Occupational Therapy; Future; Expected date: 09/29/2023  -     X-Ray Sacrum And Coccyx; Future; Expected date: 09/22/2023    Chronic rhinitis  Patient with chronic rhinitis with turbinate surgery x 2. Continues to have progressive symptoms. Has not f/u with ENT for several years - referral  placed.   Continue current regimen of flonase + singulair in the meantime. Discussed he may also add OTC antihistamine such as zyrtec or claritin.   -     Ambulatory referral/consult to ENT; Future; Expected date: 09/29/2023    Testosterone deficiency  Patient currently on weekly testosterone injection, hcg injection, and anastrozole.  Advised continued f/u with specialist and discussion of current treatment regimen in relation to he and his partner trying to conceive.     Hypertriglyceridemia  Dyslipidemia  Total cholesterol is in normal range however HDL historically low (24 today) and triglycerides high (281 today). No statin indicated.   Discussed healthy diet. Also advised increased dosage of omega-3 fatty acids supplement patient is taking.    Elevated hemoglobin  Slightly elevated at 18.4. Patient is on injectable testosterone and has this measured regularly.   No further workup or intervention. Continue regular monitoring and  follow-up with testosterone provider.    Elevated ALT measurement  Slightly elevated at 47. New finding. Remaining LFT in in normal limits. Patient asymptomatic.   No further workup at this time. Consider hepatitis panel and US if persistent or symptomatic.     Need for vaccination  Influenza vaccine(s) ordered to be completed today  -     Influenza - Quadrivalent (PF)          Return to clinic in 12 months or sooner as needed.    Aimee Moe MD  [facility]  09/22/2023

## 2023-09-25 PROBLEM — E78.5 DYSLIPIDEMIA: Status: ACTIVE | Noted: 2023-09-25

## 2023-09-25 PROBLEM — R74.01 ELEVATED ALT MEASUREMENT: Status: ACTIVE | Noted: 2023-09-25

## 2023-09-25 PROBLEM — E78.1 HYPERTRIGLYCERIDEMIA: Status: ACTIVE | Noted: 2023-09-25

## 2023-09-25 PROBLEM — E34.9 TESTOSTERONE DEFICIENCY: Status: ACTIVE | Noted: 2023-09-25

## 2023-09-25 PROBLEM — D58.2 ELEVATED HEMOGLOBIN: Status: ACTIVE | Noted: 2023-09-25

## 2024-02-28 ENCOUNTER — PATIENT MESSAGE (OUTPATIENT)
Dept: UROLOGY | Facility: CLINIC | Age: 34
End: 2024-02-28
Payer: COMMERCIAL

## 2024-03-04 ENCOUNTER — OFFICE VISIT (OUTPATIENT)
Dept: UROLOGY | Facility: CLINIC | Age: 34
End: 2024-03-04
Payer: COMMERCIAL

## 2024-03-04 VITALS
DIASTOLIC BLOOD PRESSURE: 84 MMHG | BODY MASS INDEX: 24.73 KG/M2 | HEART RATE: 86 BPM | WEIGHT: 167 LBS | SYSTOLIC BLOOD PRESSURE: 131 MMHG | HEIGHT: 69 IN

## 2024-03-04 DIAGNOSIS — R10.30 LOWER ABDOMINAL PAIN: ICD-10-CM

## 2024-03-04 DIAGNOSIS — Z87.442 HISTORY OF KIDNEY STONES: Primary | ICD-10-CM

## 2024-03-04 PROCEDURE — 99214 OFFICE O/P EST MOD 30 MIN: CPT | Mod: S$GLB,,, | Performed by: UROLOGY

## 2024-03-04 PROCEDURE — 99999 PR PBB SHADOW E&M-EST. PATIENT-LVL III: CPT | Mod: PBBFAC,,, | Performed by: UROLOGY

## 2024-03-04 RX ORDER — KETOROLAC TROMETHAMINE 10 MG/1
10 TABLET, FILM COATED ORAL EVERY 6 HOURS PRN
Qty: 20 TABLET | Refills: 0 | Status: SHIPPED | OUTPATIENT
Start: 2024-03-04 | End: 2024-03-09

## 2024-03-04 RX ORDER — TAMSULOSIN HYDROCHLORIDE 0.4 MG/1
0.4 CAPSULE ORAL DAILY
Qty: 30 CAPSULE | Refills: 0 | Status: SHIPPED | OUTPATIENT
Start: 2024-03-04 | End: 2024-04-03

## 2024-03-04 NOTE — PROGRESS NOTES
[place] - Urology   Clinic Note    SUBJECTIVE:     Chief Complaint: right flank pain    Referral from: No ref. provider found.    History of Present Illness:  Dong Denny is a 33 y.o. male who presents to clinic for right flank pain.    Patient presents with right flank and abdominal pain that started few weeks ago.  Patient has a history of kidney stones and believes he may have passed a stone recently.  He says he has had 3 stones in his life.  He has a strong family history of kidney stones.  He describes the pain is colicky and intermittent.  He also endorses pelvic pain and dysuria at the end of voiding.  Reports that stream is strong he does have increased frequency.  Denies any fevers or chills.  Additionally is on testosterone replacement therapy which is managed by an outside [facility].    10/26/22  Patient presents today feeling much better than last time.  He denies any abdominal pain.  Denies any gross hematuria.  He passed a stone 2 hours after seeing me which he brought in for stone analysis which is 100% calcium oxalate dihydrate.  No fevers or chills.  No UTI symptoms.    03/04/2024  Recently with left flank pain.  Described as colicky and intermittent.  No fevers.  No chills.  We will leave to have a kidney stone as this is similar to his previous episodes.    Patient endorses no additional complaints at this time.    Past Medical History:   Diagnosis Date    Androgenetic alopecia     Chronic rhinosinusitis     Dyslipidemia 2022    Kidney stones     Low testosterone        Past Surgical History:   Procedure Laterality Date    TOE SURGERY Left 2015    TONSILLECTOMY      TURBINATE RESECTION Bilateral 2010, 2006    done twice    TYMPANOSTOMY TUBE PLACEMENT Bilateral 1990       Family History   Problem Relation Age of Onset    Celiac disease Mother     Diabetes Father     No Known Problems Sister     No Known Problems Maternal Grandmother     No Known Problems Maternal  "Grandfather     No Known Problems Paternal Grandmother     No Known Problems Paternal Grandfather        Social History     Tobacco Use    Smoking status: Never    Smokeless tobacco: Never   Substance Use Topics    Alcohol use: Yes     Alcohol/week: 2.0 standard drinks of alcohol     Types: 2 Standard drinks or equivalent per week     Comment: socially    Drug use: No       Current Outpatient Medications on File Prior to Visit   Medication Sig Dispense Refill    anastrozole (ARIMIDEX) 1 mg Tab Take 1 mg by mouth.      chorionic gonadotropin, human (PREGNYL) 10,000 unit injection SMARTSI Unit(s) SUB-Q Twice a Week      fluticasone propionate (FLONASE) 50 mcg/actuation nasal spray 1 spray (50 mcg total) by Each Nostril route once daily. 16 g 6    montelukast (SINGULAIR) 10 mg tablet TAKE ONE TABLET BY MOUTH ONE TIME DAILY 90 tablet 3    testosterone cypionate (DEPOTESTOTERONE CYPIONATE) 200 mg/mL injection Inject into the muscle every 7 days.       No current facility-administered medications on file prior to visit.       Review of patient's allergies indicates:  No Known Allergies    Review of Systems:  A review of 10+ systems was conducted with pertinent positive and negative findings documented in HPI with all other systems reviewed and negative.    OBJECTIVE:     Estimated body mass index is 24.84 kg/m² as calculated from the following:    Height as of 23: 5' 9" (1.753 m).    Weight as of 23: 76.3 kg (168 lb 3.4 oz).    Vital Signs (Most Recent)  There were no vitals filed for this visit.      Physical Exam:  GENERAL: patient sitting comfortably  HEENT: normocephalic  NECK: supple, no JVD  PULM: normal chest rise, no increased WOB  HEART: non-diaphoretic  ABDO: soft, nondistended, nontender  BACK: no CVA tenderness bilaterally  SKIN: warm, dry, well perfused  EXT: no bruising or edema  NEURO: grossly normal with no focal deficits  PSYCH: appropriate mood and affect    Genitourinary " Exam:  deferred    Lab Results   Component Value Date    BUN 13 09/22/2023    CREATININE 1.2 09/22/2023    WBC 4.55 09/22/2023    HGB 18.4 (H) 09/22/2023    HCT 53.9 09/22/2023     09/22/2023    AST 17 09/22/2023    ALT 47 (H) 09/22/2023    ALKPHOS 46 (L) 09/22/2023    ALBUMIN 4.6 09/22/2023    HGBA1C 5.0 09/22/2023      Imaging:  I have personally reviewed the below imaging and discussed my findings with the patient.     No results found for this or any previous visit (from the past 2160 hour(s)).    No results found for this or any previous visit (from the past 2160 hour(s)).  X-Ray Lumbar Spine 2 Or 3 Views  Narrative: EXAMINATION:  XR LUMBAR SPINE 2 OR 3 VIEWS    CLINICAL HISTORY:  Low back pain, unspecified    TECHNIQUE:  Lumbar spine three views    COMPARISON:  None    FINDINGS:  The lumbosacral disc space is slightly narrowed.  The other disc spaces are well maintained.  No fracture, spondylolisthesis or bone destruction identified  Impression: See above    Electronically signed by: Lam Ragland MD  Date:    09/22/2023  Time:    09:41  X-Ray Sacrum And Coccyx  Narrative: EXAMINATION:  XR SACRUM AND COCCYX    CLINICAL HISTORY:  Low back pain, unspecified    TECHNIQUE:  Two or three views of the sacrum and coccyx were performed.    COMPARISON:  None    FINDINGS:  No fracture or dislocation.  No bone destruction identified.  The SI joints are within normal limits.  Impression: See above    Electronically signed by: Lam Ragland MD  Date:    09/22/2023  Time:    09:39         ASSESSMENT     1. History of kidney stones    2. Lower abdominal pain        PLAN:     Bilateral Nephrolithiasis  Abdominal pain    - CT stone protocol. Follow up after for imaging review    Siva Leyva MD  Urology  [facility]    Disclaimer: This note has been generated using voice-recognition software. There may be typographical errors that have been missed during proof-reading.

## 2024-03-20 ENCOUNTER — PATIENT MESSAGE (OUTPATIENT)
Dept: UROLOGY | Facility: CLINIC | Age: 34
End: 2024-03-20
Payer: COMMERCIAL

## 2024-04-05 ENCOUNTER — HOSPITAL ENCOUNTER (OUTPATIENT)
Dept: RADIOLOGY | Facility: HOSPITAL | Age: 34
Discharge: HOME | End: 2024-04-05
Attending: UROLOGY
Payer: COMMERCIAL

## 2024-04-05 DIAGNOSIS — R10.30 LOWER ABDOMINAL PAIN: ICD-10-CM

## 2024-04-05 PROCEDURE — 74176 CT ABD & PELVIS W/O CONTRAST: CPT | Mod: TC

## 2024-04-05 PROCEDURE — 74176 CT ABD & PELVIS W/O CONTRAST: CPT | Mod: 26,,, | Performed by: INTERNAL MEDICINE

## 2024-04-15 ENCOUNTER — OFFICE VISIT (OUTPATIENT)
Dept: UROLOGY | Facility: CLINIC | Age: 34
End: 2024-04-15
Payer: COMMERCIAL

## 2024-04-15 ENCOUNTER — OFFICE VISIT (OUTPATIENT)
Dept: OTOLARYNGOLOGY | Facility: CLINIC | Age: 34
End: 2024-04-15
Payer: COMMERCIAL

## 2024-04-15 VITALS
SYSTOLIC BLOOD PRESSURE: 128 MMHG | DIASTOLIC BLOOD PRESSURE: 86 MMHG | HEART RATE: 69 BPM | WEIGHT: 166.25 LBS | HEIGHT: 69 IN | BODY MASS INDEX: 24.62 KG/M2

## 2024-04-15 VITALS
BODY MASS INDEX: 25.08 KG/M2 | SYSTOLIC BLOOD PRESSURE: 128 MMHG | WEIGHT: 169.88 LBS | HEART RATE: 67 BPM | DIASTOLIC BLOOD PRESSURE: 79 MMHG

## 2024-04-15 DIAGNOSIS — N20.0 NEPHROLITHIASIS: Primary | ICD-10-CM

## 2024-04-15 DIAGNOSIS — J31.0 CHRONIC RHINITIS: Chronic | ICD-10-CM

## 2024-04-15 DIAGNOSIS — J34.89 NASAL SEPTAL PERFORATION: ICD-10-CM

## 2024-04-15 DIAGNOSIS — J30.9 ALLERGIC RHINITIS, UNSPECIFIED SEASONALITY, UNSPECIFIED TRIGGER: Primary | Chronic | ICD-10-CM

## 2024-04-15 DIAGNOSIS — J34.2 DEVIATED NASAL SEPTUM: Chronic | ICD-10-CM

## 2024-04-15 DIAGNOSIS — R10.30 LOWER ABDOMINAL PAIN: ICD-10-CM

## 2024-04-15 PROCEDURE — 99214 OFFICE O/P EST MOD 30 MIN: CPT | Mod: S$GLB,,, | Performed by: UROLOGY

## 2024-04-15 PROCEDURE — 31231 NASAL ENDOSCOPY DX: CPT | Mod: S$GLB,,, | Performed by: OTOLARYNGOLOGY

## 2024-04-15 PROCEDURE — 99999 PR PBB SHADOW E&M-EST. PATIENT-LVL IV: CPT | Mod: PBBFAC,,, | Performed by: OTOLARYNGOLOGY

## 2024-04-15 PROCEDURE — 99999 PR PBB SHADOW E&M-EST. PATIENT-LVL III: CPT | Mod: PBBFAC,,, | Performed by: UROLOGY

## 2024-04-15 PROCEDURE — 99204 OFFICE O/P NEW MOD 45 MIN: CPT | Mod: 25,S$GLB,, | Performed by: OTOLARYNGOLOGY

## 2024-04-15 RX ORDER — LEVOCETIRIZINE DIHYDROCHLORIDE 5 MG/1
5 TABLET, FILM COATED ORAL NIGHTLY
Qty: 30 TABLET | Refills: 11 | Status: SHIPPED | OUTPATIENT
Start: 2024-04-15 | End: 2025-04-15

## 2024-04-15 RX ORDER — CLOMIPHENE CITRATE 50 MG/1
50 TABLET ORAL DAILY
COMMUNITY

## 2024-04-15 RX ORDER — AZELASTINE HYDROCHLORIDE, FLUTICASONE PROPIONATE 137; 50 UG/1; UG/1
1 SPRAY, METERED NASAL 2 TIMES DAILY
Qty: 23 G | Refills: 11 | Status: SHIPPED | OUTPATIENT
Start: 2024-04-15 | End: 2024-06-03 | Stop reason: SDUPTHER

## 2024-04-15 NOTE — PROGRESS NOTES
[place] - Urology   Clinic Note    SUBJECTIVE:     Chief Complaint: right flank pain    Referral from: No ref. provider found.    History of Present Illness:  Dong Denny is a 33 y.o. male who presents to clinic for right flank pain.    Patient presents with right flank and abdominal pain that started few weeks ago.  Patient has a history of kidney stones and believes he may have passed a stone recently.  He says he has had 3 stones in his life.  He has a strong family history of kidney stones.  He describes the pain is colicky and intermittent.  He also endorses pelvic pain and dysuria at the end of voiding.  Reports that stream is strong he does have increased frequency.  Denies any fevers or chills.  Additionally is on testosterone replacement therapy which is managed by an outside [facility].    10/26/22  Patient presents today feeling much better than last time.  He denies any abdominal pain.  Denies any gross hematuria.  He passed a stone 2 hours after seeing me which he brought in for stone analysis which is 100% calcium oxalate dihydrate.  No fevers or chills.  No UTI symptoms.    03/04/2024  Recently with left flank pain.  Described as colicky and intermittent.  No fevers.  No chills.  We will leave to have a kidney stone as this is similar to his previous episodes.    04/15/2024  Abdominal pain has resolved.  Now with a CT scan completed which showed small bilateral nonobstructing renal stones.    Patient endorses no additional complaints at this time.    Past Medical History:   Diagnosis Date    Androgenetic alopecia     Chronic rhinosinusitis     Dyslipidemia 2022    Kidney stones     Low testosterone        Past Surgical History:   Procedure Laterality Date    TOE SURGERY Left 2015    TONSILLECTOMY      TURBINATE RESECTION Bilateral 2010, 2006    done twice    TYMPANOSTOMY TUBE PLACEMENT Bilateral 1990       Family History   Problem Relation Name Age of Onset    Celiac disease  "Mother      Diabetes Father      No Known Problems Sister      No Known Problems Maternal Grandmother      No Known Problems Maternal Grandfather      No Known Problems Paternal Grandmother      No Known Problems Paternal Grandfather         Social History     Tobacco Use    Smoking status: Never    Smokeless tobacco: Never   Substance Use Topics    Alcohol use: Yes     Alcohol/week: 2.0 standard drinks of alcohol     Types: 2 Standard drinks or equivalent per week     Comment: socially    Drug use: No       Current Outpatient Medications on File Prior to Visit   Medication Sig Dispense Refill    anastrozole (ARIMIDEX) 1 mg Tab Take 1 mg by mouth.      chorionic gonadotropin, human (PREGNYL) 10,000 unit injection SMARTSI Unit(s) SUB-Q Twice a Week      clomiPHENE (CLOMID) 50 mg tablet Take 50 mg by mouth once daily.      fluticasone propionate (FLONASE) 50 mcg/actuation nasal spray 1 spray (50 mcg total) by Each Nostril route once daily. (Patient not taking: Reported on 4/15/2024) 16 g 6    montelukast (SINGULAIR) 10 mg tablet TAKE ONE TABLET BY MOUTH ONE TIME DAILY (Patient not taking: Reported on 4/15/2024) 90 tablet 3    tamsulosin (FLOMAX) 0.4 mg Cap Take 1 capsule (0.4 mg total) by mouth once daily. 30 capsule 0    testosterone cypionate (DEPOTESTOTERONE CYPIONATE) 200 mg/mL injection Inject into the muscle every 7 days.       No current facility-administered medications on file prior to visit.       Review of patient's allergies indicates:  No Known Allergies    Review of Systems:  A review of 10+ systems was conducted with pertinent positive and negative findings documented in HPI with all other systems reviewed and negative.    OBJECTIVE:     Estimated body mass index is 24.55 kg/m² as calculated from the following:    Height as of this encounter: 5' 9" (1.753 m).    Weight as of this encounter: 75.4 kg (166 lb 3.6 oz).    Vital Signs (Most Recent)  Vitals:    04/15/24 1059   BP: 128/86   Pulse: 69 "         Physical Exam:  GENERAL: patient sitting comfortably  HEENT: normocephalic  NECK: supple, no JVD  PULM: normal chest rise, no increased WOB  HEART: non-diaphoretic  ABDO: soft, nondistended, nontender  BACK: no CVA tenderness bilaterally  SKIN: warm, dry, well perfused  EXT: no bruising or edema  NEURO: grossly normal with no focal deficits  PSYCH: appropriate mood and affect    Genitourinary Exam:  deferred    Lab Results   Component Value Date    BUN 13 09/22/2023    CREATININE 1.2 09/22/2023    WBC 4.55 09/22/2023    HGB 18.4 (H) 09/22/2023    HCT 53.9 09/22/2023     09/22/2023    AST 17 09/22/2023    ALT 47 (H) 09/22/2023    ALKPHOS 46 (L) 09/22/2023    ALBUMIN 4.6 09/22/2023    HGBA1C 5.0 09/22/2023      Imaging:  I have personally reviewed the below imaging and discussed my findings with the patient.     Results for orders placed or performed during the hospital encounter of 04/05/24 (from the past 2160 hour(s))   CT Renal Stone Study ABD Pelvis WO    Narrative    EXAMINATION:  CT RENAL STONE STUDY ABD PELVIS WO    CLINICAL HISTORY:  Flank pain, kidney stone suspected; Lower abdominal pain, unspecified    TECHNIQUE:  Low dose axial images, sagittal and coronal reformations were obtained from the lung bases to the pubic symphysis.  Contrast was not administered.    COMPARISON:  CT urogram 10/14/2022    FINDINGS:  LUNG BASES: Unremarkable.    HEPATOBILIARY: Diffuse hepatic steatosis.  There is fat sparing near the gallbladder fossa.  Gallbladder is unremarkable.  No bile duct dilatation.    SPLEEN: No splenomegaly.    PANCREAS: No focal masses or ductal dilatation.    ADRENALS: No adrenal nodules.    KIDNEYS/URETERS: Multiple nonobstructing renal stones bilaterally, the largest measuring 5 mm in the left upper pole.  No ureteral stones or hydronephrosis.  Bilateral renal cysts.    BLADDER/PELVIC ORGANS: Unremarkable.    PERITONEUM / RETROPERITONEUM: No free air or fluid.  There is a 1.5 cm fat  containing lesion in the left hemiabdomen adjacent to the descending colon (2:80), new from prior.    LYMPH NODES: No lymphadenopathy.    VESSELS: Unremarkable.    GI TRACT: No distention or wall thickening. Normal appendix.    BONES AND SOFT TISSUES: No fractures or focal osseous lesions.      Impression    Multiple nonobstructing renal stones bilaterally measuring up to 5 mm.  No hydronephrosis.    Hepatic steatosis.    New 1.5 cm fat containing lesion adjacent to the descending colon, concerning for epiploic appendagitis.      Electronically signed by: Jamel Trevino  Date:    04/05/2024  Time:    11:03       No results found for this or any previous visit (from the past 2160 hour(s)).  CT Renal Stone Study ABD Pelvis WO  Narrative: EXAMINATION:  CT RENAL STONE STUDY ABD PELVIS WO    CLINICAL HISTORY:  Flank pain, kidney stone suspected; Lower abdominal pain, unspecified    TECHNIQUE:  Low dose axial images, sagittal and coronal reformations were obtained from the lung bases to the pubic symphysis.  Contrast was not administered.    COMPARISON:  CT urogram 10/14/2022    FINDINGS:  LUNG BASES: Unremarkable.    HEPATOBILIARY: Diffuse hepatic steatosis.  There is fat sparing near the gallbladder fossa.  Gallbladder is unremarkable.  No bile duct dilatation.    SPLEEN: No splenomegaly.    PANCREAS: No focal masses or ductal dilatation.    ADRENALS: No adrenal nodules.    KIDNEYS/URETERS: Multiple nonobstructing renal stones bilaterally, the largest measuring 5 mm in the left upper pole.  No ureteral stones or hydronephrosis.  Bilateral renal cysts.    BLADDER/PELVIC ORGANS: Unremarkable.    PERITONEUM / RETROPERITONEUM: No free air or fluid.  There is a 1.5 cm fat containing lesion in the left hemiabdomen adjacent to the descending colon (2:80), new from prior.    LYMPH NODES: No lymphadenopathy.    VESSELS: Unremarkable.    GI TRACT: No distention or wall thickening. Normal appendix.    BONES AND SOFT TISSUES: No  fractures or focal osseous lesions.  Impression: Multiple nonobstructing renal stones bilaterally measuring up to 5 mm.  No hydronephrosis.    Hepatic steatosis.    New 1.5 cm fat containing lesion adjacent to the descending colon, concerning for epiploic appendagitis.    Electronically signed by: Jamel Trevino  Date:    04/05/2024  Time:    11:03         ASSESSMENT     1. Nephrolithiasis    2. Lower abdominal pain          PLAN:     Bilateral Nephrolithiasis  Abdominal pain    - pain has resolved    - patient would nonobstructing renal stones.  Discussed options.  Patient would like to observe them with annual imaging.  He can return to clinic in 1 year.    Siva Leyva MD  Urology  [facility]    Disclaimer: This note has been generated using voice-recognition software. There may be typographical errors that have been missed during proof-reading.

## 2024-04-15 NOTE — PROGRESS NOTES
Chief Complaint   Patient presents with    Nasal Congestion        HPI:   The pt is a 33 y.o. male with nasal congestion of many years duration. The congestion is reported to be moderate. Associated signs and symptoms are clear runny nose, post nasal drip, and sniffling. The patient ( patient representative) denies purulent runny nose, facial pain, headache, and cough.    There is no history of snoring. There is no of sleep disturbance.    The patient has exercised induced asthma years ago, but not current symtpoms. .  The patient does not have eczema.  The patient has been diagnosed with allergic rhinitis.     The patient has been treated with: OTC antihistamines, prescription antihistamines, intranasal steroids, and montelukast.  He has not tried intranasal antihistamines.  He is currently taking Allegra.    The response to the above noted treatment is described as: minimal improvement. The patient's evaluation to date includes: allergy testing.  Allergy testing years ago showed sensitivity to grasses, this was done in 2005.  Patient had a septoplasty and turbinate reduction done many years ago, and in the more recent past had an additional turbinate reduction done by Dr. Milena Mancia a few years ago.  Dr. Mancia had mentioned at that time that his septum was still deviated and that he had a small septal perforation that may additionally be causing some of his symptoms.                  Past Medical History:   Diagnosis Date    Androgenetic alopecia     Chronic rhinosinusitis     Dyslipidemia 2022    Kidney stones     Low testosterone      Social History     Socioeconomic History    Marital status:    Occupational History    Occupation:    Tobacco Use    Smoking status: Never    Smokeless tobacco: Never   Substance and Sexual Activity    Alcohol use: Yes     Alcohol/week: 2.0 standard drinks of alcohol     Types: 2 Standard drinks or equivalent per week     Comment: socially    Drug use: No     Sexual activity: Yes     Partners: Female     Past Surgical History:   Procedure Laterality Date    TOE SURGERY Left 2015    TONSILLECTOMY      TURBINATE RESECTION Bilateral 2010, 2006    done twice    TYMPANOSTOMY TUBE PLACEMENT Bilateral 1990     Family History   Problem Relation Name Age of Onset    Celiac disease Mother      Diabetes Father      No Known Problems Sister      No Known Problems Maternal Grandmother      No Known Problems Maternal Grandfather      No Known Problems Paternal Grandmother      No Known Problems Paternal Grandfather             Review of Systems  General: negative for chills, fever or weight loss  Psychological: negative for mood changes or depression  Ophthalmic: negative for blurry vision, photophobia or eye pain  ENT: see HPI  Respiratory: no cough, shortness of breath, or wheezing  Cardiovascular: no chest pain or dyspnea on exertion  Gastrointestinal: no abdominal pain, change in bowel habits, or black/ bloody stools  Musculoskeletal: negative for gait disturbance or muscular weakness  Neurological: no syncope or seizures; no ataxia  Dermatological: negative for pruritis,  rash and jaundice  Hematologic/lymphatic: no easy bruising, no new adenopathy      Physical Exam:    Vitals:    04/15/24 1311   BP: 128/79   Pulse: 67         Constitutional:   He is oriented to person, place, and time. Vital signs are normal. He appears well-developed and well-nourished. He appears alert. He is cooperative. Normal speech.      Head:  Normocephalic and atraumatic. Salivary glands normal.  Facial strength is normal.      Ears:  Hearing normal to normal and whispered voice; external ear normal without scars, lesions, or masses; ear canal, tympanic membrane, and middle ear normal., right ear hearing normal to normal and whispered voice; external ear normal without scars, lesions, or masses; ear canal, tympanic membrane, and middle ear normal. and left ear hearing normal to normal and whispered  voice; external ear normal without scars, lesions, or masses; ear canal, tympanic membrane, and middle ear normal..   Right Ear: Tympanic membrane is not erythematous and not retracted. No middle ear effusion.   Left Ear: Tympanic membrane is not erythematous and not retracted.  No middle ear effusion.     Nose:  Mucosal edema and septal deviation (Septum still persistently deviated to right) present. No rhinorrhea or polyps. Turbinates abnormal.  No turbinate hypertrophy (Previous turbinate reduction/resection; small residual turbinate on left, moderate size residual turbinate on right).  Right sinus exhibits no maxillary sinus tenderness and no frontal sinus tenderness. Left sinus exhibits no maxillary sinus tenderness and no frontal sinus tenderness.     Mouth/Throat  Oropharynx clear and moist without lesions or asymmetry, normal uvula midline, lips, teeth, and gums normal and oropharynx normal. No uvula swelling, oral lesions, trismus or mucous membrane lesions. No oropharyngeal exudate, posterior oropharyngeal edema or posterior oropharyngeal erythema. Tonsils not present.  Mirror exam not performed due to patient tolerance.  Mirror exam not performed due to patient tolerance.      Neck:  Neck normal without thyromegaly masses, asymmetry, normal tracheal structure, crepitus, and tenderness, thyroid normal, trachea normal, phonation normal, full range of motion with neck supple and no adenopathy. No JVD present. Carotid bruit is not present. No thyroid mass and no thyromegaly present.     He has no cervical adenopathy.     Cardiovascular:    Normal rate, regular rhythm and rate and rhythm, heart sounds, and pulses normal.              Pulmonary/Chest:   Effort and breath sounds normal.     Psychiatric:   He has a normal mood and affect. His speech is normal and behavior is normal.     Neurological:   He is alert and oriented to person, place, and time. He has neurological normal, alert and oriented. No cranial  nerve deficit.     Skin:   No abrasions, lacerations, lesions, or rashes.         Nasal/sinus endoscopy    Date/Time: 4/15/2024 1:00 PM    Performed by: Salome Mckenna MD  Authorized by: Salome Mckenna MD    Consent Done?:  Yes (Verbal)  Anesthesia:     Local anesthetic:  Topical anesthetic    Location: Bilateral.    Patient tolerance:  Patient tolerated the procedure well with no immediate complications  Nose:     Procedure Performed:  Nasal Endoscopy  External:      No external nasal deformity  Intranasal:      Mucosa no polyps     Mucosa ulcers not present     No mucosa lesions present     Turbinates not enlarged     Septum gross deformity  Nasopharynx:      No mucosa lesions     Adenoids not present     Posterior choanae patent     Eustachian tube patent       Middle and superior meatus clear, sphenoethmoidal recess clear.  Moderate to large amount of clear mucus present bilateral nasal cavities, worse on left.  Significant resection of bilateral turbinates, left being worse.  Septal deviation still persists to the right side and there is a small septal perforation at the central mid septum noted in photographs below.  No crusting, bleeding, irregularities of the mucosa noted.                    Assessment:    ICD-10-CM ICD-9-CM    1. Allergic rhinitis, unspecified seasonality, unspecified trigger  J30.9 477.9 levocetirizine (XYZAL) 5 MG tablet      Ambulatory referral/consult to Allergy      2. Chronic rhinitis  J31.0 472.0 levocetirizine (XYZAL) 5 MG tablet      Ambulatory referral/consult to Allergy      3. Deviated nasal septum  J34.2 470       4. Nasal septal perforation  J34.89 478.19         The primary encounter diagnosis was Allergic rhinitis, unspecified seasonality, unspecified trigger. Diagnoses of Chronic rhinitis, Deviated nasal septum, and Nasal septal perforation were also pertinent to this visit.      Plan:    The patient was given a prescription for a nasal steroid and/or nasal  antihistamine nasal spray and we discussed in detail the proper mechanism of use directing the spray away from the nasal septum.  The patient was also instructed to take a daily oral antihistamine (Claritin, Zyrtec, Allegra, or Xyzal).    In addition, we also discussed that it will take 3-4 weeks of daily use to achieve maximal effectiveness.  The patient will please call in 3-4 weeks with their progress. Allergy referral made for workup of continued allergic rhinitis.     We discussed option of facial plastic referral if he would like to see about correction of septal deviation and perforation, but we will hold off for now.    Follow-up in 4 months or sooner if needed.      Salome Mckenna MD         no

## 2024-04-15 NOTE — PATIENT INSTRUCTIONS
The patient was given a prescription for a nasal steroid and/or nasal antihistamine nasal spray and we discussed in detail the proper mechanism of use directing the spray away from the nasal septum.  The patient was also instructed to take a daily oral antihistamine (Claritin, Zyrtec, Allegra, or Xyzal).    In addition, we also discussed that it will take 3-4 weeks of daily use to achieve maximal effectiveness.  The patient will please call in 3-4 weeks with their progress. Allergy referral made.     We discussed option of facial plastic referral if he would like to see about correction of septal deviation and perforation, but we will hold off for now.     How do you use a Nasal Corticosteroid Spray?    Make sure you understand your dosing instructions. Spray only the number of prescribed sprays in each nostril. Read the package instructions before using your spray the first time.    Most corticosteroid sprays suggest the following steps:    Wash your hands well.    Gently blow your nose to clear the passageway.    Shake the container several times.    Tilt your head slightly downward.  Use the opposite hand from the nostril you will be spraying to hold the spray bottle.    Block one nostril with your finger.  Insert the nasal applicator into the other nostril.    Aim the spray toward the outer wall of the nostril.  Inhale slowly through the nose and press the .    Breathe out and repeat to apply the prescribed number of sprays.  Repeat these steps for the other nostril.     Avoid sneezing or blowing your nose right after spraying.

## 2024-04-18 DIAGNOSIS — Z00.00 ROUTINE MEDICAL EXAM: Primary | ICD-10-CM

## 2024-04-22 ENCOUNTER — LAB VISIT (OUTPATIENT)
Dept: LAB | Facility: HOSPITAL | Age: 34
End: 2024-04-22
Attending: INTERNAL MEDICINE
Payer: COMMERCIAL

## 2024-04-22 ENCOUNTER — OFFICE VISIT (OUTPATIENT)
Dept: ALLERGY | Facility: CLINIC | Age: 34
End: 2024-04-22
Payer: COMMERCIAL

## 2024-04-22 ENCOUNTER — PATIENT MESSAGE (OUTPATIENT)
Dept: OTOLARYNGOLOGY | Facility: CLINIC | Age: 34
End: 2024-04-22
Payer: COMMERCIAL

## 2024-04-22 VITALS
WEIGHT: 170.63 LBS | DIASTOLIC BLOOD PRESSURE: 94 MMHG | SYSTOLIC BLOOD PRESSURE: 143 MMHG | HEART RATE: 59 BPM | BODY MASS INDEX: 25.2 KG/M2

## 2024-04-22 DIAGNOSIS — J31.0 CHRONIC RHINITIS: Chronic | ICD-10-CM

## 2024-04-22 DIAGNOSIS — J31.0 CHRONIC RHINITIS: Primary | Chronic | ICD-10-CM

## 2024-04-22 PROCEDURE — 36415 COLL VENOUS BLD VENIPUNCTURE: CPT | Performed by: STUDENT IN AN ORGANIZED HEALTH CARE EDUCATION/TRAINING PROGRAM

## 2024-04-22 PROCEDURE — 86003 ALLG SPEC IGE CRUDE XTRC EA: CPT | Mod: 59 | Performed by: STUDENT IN AN ORGANIZED HEALTH CARE EDUCATION/TRAINING PROGRAM

## 2024-04-22 PROCEDURE — 99999 PR PBB SHADOW E&M-EST. PATIENT-LVL III: CPT | Mod: PBBFAC,,, | Performed by: STUDENT IN AN ORGANIZED HEALTH CARE EDUCATION/TRAINING PROGRAM

## 2024-04-22 PROCEDURE — 86003 ALLG SPEC IGE CRUDE XTRC EA: CPT | Performed by: STUDENT IN AN ORGANIZED HEALTH CARE EDUCATION/TRAINING PROGRAM

## 2024-04-22 PROCEDURE — 99204 OFFICE O/P NEW MOD 45 MIN: CPT | Mod: S$GLB,,, | Performed by: STUDENT IN AN ORGANIZED HEALTH CARE EDUCATION/TRAINING PROGRAM

## 2024-04-22 NOTE — TELEPHONE ENCOUNTER
It was ordered and received by Nisa, so you may need to speak to them.      Salome Mckenna MD  [facility]    This note was created by combination of typed  and MModal dictation.  Transcription errors may be present.  If there are any questions, please contact me.

## 2024-04-22 NOTE — PROGRESS NOTES
ALLERGY & IMMUNOLOGY CLINIC - INITIAL CONSULTATION      HISTORY OF PRESENT ILLNESS     Patient ID: Dong Denny is a 33 y.o. male    CC: chronic rhinitis     HPI: Dong Denny is a 33 y.o. male with a history of nasal septal deviation and small perforation, presenting for chronic rhinitis.  Patient was referred by Salome Mckenna MD (ENT).    Symptoms started around high school. He is bothered by mucus constantly on the back of his palate. Makes a sound when he breathes through his nose, almost like he is trying to force mucus out of his nose instead of letting it drip down back of throat. Says he doesn't notice it much anymore, but he is newly  and his wife notices it.   Frequently needs to blow his nose.   Also bothered by post-nasal drip.  Patient denies congestion, sneezing, rhinorrhea, cough, nasal pruritus, ocular pruritus.  Symptoms are perennial. He doesn't thinks there is any seasonal variation.   Symptoms occur every day.  There is no noticeable difference between indoors and outdoors.   The patient has not identified any triggers.   He has dogs, but symptoms persist when they go on vacations without the dogs.   The patient denies heartburn/reflux symptoms.  He endorses hyposmia, but says this has probably always been the case.   He recalls having allergy skin testing as a teenager around 2005. He recalls grass being positive.     He saw ENT last week. Was prescribed dymista, which he started 3-4 days. He is using it 1 SEN BID.  He says he woke up this morning feeling a little better, so he thinks it might be helping a little.   He was also prescribed xyzal, but hasn't gotten it from the pharmacy yet.   He was previously on flonase and singulair, but didn't find it helpful. Says he stopped the montelukast last year.  He has tried multiple antihistamines like claritin, allegra, zyrtec; didn't find them helpful.  He recalls being on rhinocort in the past, but doesn't recall  it helping.     No shortness of breath or wheezing.   He says he has noticed some lymphadenopathy in his neck for the past 5 years. When asked if it is persistently there or comes and go, he says it must come and go, because he doesn't feel it right now.      MEDICAL HISTORY     Vaccines:    Immunization History   Administered Date(s) Administered    COVID-19 Vaccine 2020, 2021    COVID-19, MRNA, LN-S, PF (MODERNA FULL 0.5 ML DOSE) 2020, 2021    COVID-19, MRNA, LN-S, PF (Pfizer) (Purple Cap) 2021    Hepatitis B 10/16/1998, 1998, 1999    Influenza - Quadrivalent - PF *Preferred* (6 months and older) 10/04/2022, 2023    Tdap 2016     Medical Hx:   Patient Active Problem List   Diagnosis    Hammer toe    Chronic rhinitis    Elevated ALT measurement    Elevated hemoglobin    Dyslipidemia    Hypertriglyceridemia    Testosterone deficiency     Surgical Hx:   Past Surgical History:   Procedure Laterality Date    TOE SURGERY Left 2015    TONSILLECTOMY      TURBINATE RESECTION Bilateral ,     done twice    TYMPANOSTOMY TUBE PLACEMENT Bilateral      Medications:   Current Outpatient Medications on File Prior to Visit   Medication Sig Dispense Refill    anastrozole (ARIMIDEX) 1 mg Tab Take 1 mg by mouth.      azelastine-fluticasone (DYMISTA) 137-50 mcg/spray Spry nassal spray 1 spray by Each Nostril route 2 (two) times daily. 23 g 11    chorionic gonadotropin, human (PREGNYL) 10,000 unit injection SMARTSI Unit(s) SUB-Q Twice a Week      clomiPHENE (CLOMID) 50 mg tablet Take 50 mg by mouth once daily.      levocetirizine (XYZAL) 5 MG tablet Take 1 tablet (5 mg total) by mouth every evening. (Patient not taking: Reported on 2024) 30 tablet 11    montelukast (SINGULAIR) 10 mg tablet TAKE ONE TABLET BY MOUTH ONE TIME DAILY (Patient not taking: Reported on 4/15/2024) 90 tablet 3    tamsulosin (FLOMAX) 0.4 mg Cap Take 1 capsule (0.4 mg total) by mouth once  daily. 30 capsule 0     No current facility-administered medications on file prior to visit.     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    Infection Hx: Denies frequent infections requiring antibiotics. No history of pneumonia.    Social Hx:   Social History     Tobacco Use    Smoking status: Never    Smokeless tobacco: Never   Substance Use Topics    Alcohol use: Yes     Alcohol/week: 2.0 standard drinks of alcohol     Types: 2 Standard drinks or equivalent per week     Comment: socially    Drug use: No     Family Hx:   Asthma: no  Allergic rhinitis: no  Family History   Problem Relation Name Age of Onset    Celiac disease Mother      Diabetes Father      No Known Problems Sister      No Known Problems Maternal Grandmother      No Known Problems Maternal Grandfather      No Known Problems Paternal Grandmother      No Known Problems Paternal Grandfather        PHYSICAL EXAM     VS: BP (!) 143/94 (BP Location: Left arm, Patient Position: Sitting, BP Method: Medium (Automatic))   Pulse (!) 59   Wt 77.4 kg (170 lb 10.2 oz)   BMI 25.20 kg/m²   GENERAL: Alert, NAD, well-appearing  EYES: EOMI, no conjunctival injection, no discharge, no infraorbital shiners  NOSE: NT 1+ B/L, + deviated septum to right, no stringing mucus, no polyps visualized  ORAL: MMM, no ulcers, no thrush  NECK: no thyromegaly, no LAD  LUNGS: CTAB, no w/r/c, no increased WOB  HEART: RRR, normal S1/S2, no m/g/r  DERM: no rashes, no skin breaks  NEURO: normal speech, normal gait, no facial asymmetry     LABORATORY STUDIES     Component      Latest Ref Rng 9/25/2017 10/4/2022 9/22/2023   WBC      3.90 - 12.70 K/uL 6.06  4.10  4.55     RBC      4.60 - 6.20 M/uL 5.29  5.37  5.94    Hemoglobin      14.0 - 18.0 g/dL 15.7  16.6  18.4 (H)    Hematocrit      40.0 - 54.0 % 45.7  48.0  53.9    MCV      82 - 98 fL 86  89  91    MCH      27.0 - 31.0 pg 29.7  30.9  31.0    MCHC      32.0 - 36.0 g/dL 34.4  34.6  34.1    RDW      11.5 - 14.5 % 12.3  13.0  12.5    Platelet Count      150 - 450 K/uL 296  285  292    MPV      9.2 - 12.9 fL 9.6  9.2  9.6    Gran # (ANC)      1.8 - 7.7 K/uL 4.7      Lymph #      1.0 - 4.8 K/uL 0.9 (L)      Mono #      0.3 - 1.0 K/uL 0.4      Eos #      0.0 - 0.5 K/uL 0.0      Differential Method Automated      Sodium      136 - 145 mmol/L  137  142    Potassium      3.5 - 5.1 mmol/L  4.1  4.8    Chloride      95 - 110 mmol/L  101  104    CO2      23 - 29 mmol/L  27  31 (H)    Glucose      70 - 110 mg/dL  103  112 (H)    BUN      6 - 20 mg/dL  11  13    Creatinine      0.5 - 1.4 mg/dL  1.1  1.2    Calcium      8.7 - 10.5 mg/dL  9.2  10.3    PROTEIN TOTAL      6.0 - 8.4 g/dL  7.1  7.5    Albumin      3.5 - 5.2 g/dL  4.5  4.6    BILIRUBIN TOTAL      0.1 - 1.0 mg/dL  1.1 (H)  0.5    ALP      55 - 135 U/L  43 (L)  46 (L)    AST      10 - 40 U/L  19  17    ALT      10 - 44 U/L  28  47 (H)    Hemoglobin A1C External      4.0 - 5.6 %   5.0    TSH      0.400 - 4.000 uIU/mL  1.419     Hepatitis C Ab      Non-reactive    Non-reactive       ALLERGEN TESTING     Immunocaps: Ordered     IMAGING & OTHER DIAGNOSTICS     CXR 7/9/20:  FINDINGS:  Heart normal.  Lungs clear.  Impression:  Stable normal chest.     CHART REVIEW     Reviewed ENT note, labs, imaging.      ASSESSMENT & PLAN     Dong Denny is a 33 y.o. male with     # Chronic rhinitis: Symptoms daily and perennial. Main symptoms are feeling of significant mucus production, need for frequent nose blowing, and post-nasal drip. He has not noticed benefit from oral antihistamines, montelukast, or flonase in the past. He saw ENT recently, started dymista a few days ago, and he  thinks it might be helping a little.  -immunocaps for aeroallergens ordered.  -continue dymista 1 SEN BID.  -agree with starting levocetirizine nightly.   -if symptoms not controlled, and if testing shows evidence of allergy, can further discuss immunotherapy options at next visit.       Follow up: September     I spent a total of 45 minutes on the day of the visit.  This includes face to face time and non-face to face time preparing to see the patient (eg, review of tests), obtaining and/or reviewing separately obtained history, documenting clinical information in the electronic or other health record.    Trent Romero MD  Allergy/Immunology

## 2024-04-25 LAB
A ALTERNATA IGE QN: <0.1 KU/L
A FUMIGATUS IGE QN: <0.1 KU/L
ALLERGEN BOXELDER MAPLE TREE IGE: <0.1 KU/L
ALLERGEN MAPLE (BOX ELDER) CLASS: NORMAL
ALLERGEN WHITE ASH TREE IGE: <0.1 KU/L
BAHIA GRASS IGE QN: <0.1 KU/L
BERMUDA GRASS IGE QN: <0.1 KU/L
CAT DANDER IGE QN: <0.1 KU/L
CEDAR IGE QN: <0.1 KU/L
COTTONWOOD IGE QN: <0.1 KU/L
D FARINAE IGE QN: <0.1 KU/L
D PTERONYSS IGE QN: <0.1 KU/L
DEPRECATED A ALTERNATA IGE RAST QL: NORMAL
DEPRECATED A FUMIGATUS IGE RAST QL: NORMAL
DEPRECATED BAHIA GRASS IGE RAST QL: NORMAL
DEPRECATED BERMUDA GRASS IGE RAST QL: NORMAL
DEPRECATED CAT DANDER IGE RAST QL: NORMAL
DEPRECATED CEDAR IGE RAST QL: NORMAL
DEPRECATED COTTONWOOD IGE RAST QL: NORMAL
DEPRECATED D FARINAE IGE RAST QL: NORMAL
DEPRECATED D PTERONYSS IGE RAST QL: NORMAL
DEPRECATED DOG DANDER IGE RAST QL: NORMAL
DEPRECATED ELDER IGE RAST QL: NORMAL
DEPRECATED ENGL PLANTAIN IGE RAST QL: NORMAL
DEPRECATED JOHNSON GRASS IGE RAST QL: NORMAL
DEPRECATED PECAN/HICK TREE IGE RAST QL: NORMAL
DEPRECATED ROACH IGE RAST QL: NORMAL
DEPRECATED SALTWORT IGE RAST QL: NORMAL
DEPRECATED SHEEP SORREL IGE RAST QL: NORMAL
DEPRECATED TIMOTHY IGE RAST QL: NORMAL
DEPRECATED WEST RAGWEED IGE RAST QL: NORMAL
DEPRECATED WHITE OAK IGE RAST QL: NORMAL
DOG DANDER IGE QN: <0.1 KU/L
ELDER IGE QN: <0.1 KU/L
ENGL PLANTAIN IGE QN: <0.1 KU/L
JOHNSON GRASS IGE QN: <0.1 KU/L
PECAN/HICK TREE IGE QN: <0.1 KU/L
ROACH IGE QN: <0.1 KU/L
SALTWORT IGE QN: <0.1 KU/L
SHEEP SORREL IGE QN: <0.1 KU/L
TIMOTHY IGE QN: <0.1 KU/L
WEST RAGWEED IGE QN: <0.1 KU/L
WHITE ASH CLASS: NORMAL
WHITE OAK IGE QN: <0.1 KU/L

## 2024-04-26 ENCOUNTER — PATIENT MESSAGE (OUTPATIENT)
Dept: ALLERGY | Facility: CLINIC | Age: 34
End: 2024-04-26
Payer: COMMERCIAL

## 2024-05-03 ENCOUNTER — OFFICE VISIT (OUTPATIENT)
Dept: UROLOGY | Facility: CLINIC | Age: 34
End: 2024-05-03
Payer: COMMERCIAL

## 2024-05-03 VITALS
SYSTOLIC BLOOD PRESSURE: 132 MMHG | BODY MASS INDEX: 25.31 KG/M2 | HEIGHT: 69 IN | DIASTOLIC BLOOD PRESSURE: 84 MMHG | WEIGHT: 170.88 LBS | HEART RATE: 64 BPM

## 2024-05-03 DIAGNOSIS — N52.9 ERECTILE DYSFUNCTION, UNSPECIFIED ERECTILE DYSFUNCTION TYPE: ICD-10-CM

## 2024-05-03 DIAGNOSIS — E34.9 TESTOSTERONE DEFICIENCY: Primary | ICD-10-CM

## 2024-05-03 PROCEDURE — 99213 OFFICE O/P EST LOW 20 MIN: CPT | Mod: S$GLB,,,

## 2024-05-03 PROCEDURE — 99999 PR PBB SHADOW E&M-EST. PATIENT-LVL III: CPT | Mod: PBBFAC,,,

## 2024-05-03 RX ORDER — TADALAFIL 5 MG/1
5 TABLET ORAL DAILY PRN
Qty: 30 TABLET | Refills: 11 | Status: SHIPPED | OUTPATIENT
Start: 2024-05-03 | End: 2025-05-03

## 2024-05-03 NOTE — PROGRESS NOTES
Subjective:       Patient ID: Dong Denny is a 33 y.o. male.    Chief Complaint: ED     This is a 33 y.o.  male patient that is new to me but not new to the system.  This is a patient of Dr. Leyva. He has a history of kidney stones, stone analysis showed 100% calcium oxalate dihydrate in 2022. Patient receives TRT through an outside men's clinic. He has stopped injections due him and his wife trying to conceive. He was previously on TRT with HCG injections. Reports last injection was almost 6 months ago. Reports that in the future he would like to continue TRT through our clinic. Patient here with complaints of erectile dysfunction.  He endorses difficulty getting and maintaining erections.  He is not able to reliably achieve a strong enough erection for intercourse.   JOHNATHAN: 11/25  Previous ED treatment: no  Patient endorses no additional complaints at this time.         Lab Results   Component Value Date    CREATININE 1.2 09/22/2023       ---  PMH/PSH/Medications/Allergies/Social history reviewed and as in chart.    Review of Systems   Constitutional:  Negative for activity change, chills and fever.   Respiratory:  Negative for shortness of breath.    Cardiovascular:  Negative for chest pain and palpitations.   Gastrointestinal:  Negative for abdominal pain and constipation.   Genitourinary:  Negative for difficulty urinating, dysuria, flank pain, frequency, hematuria and urgency.   Neurological:  Negative for dizziness and light-headedness.     Objective:      Physical Exam  HENT:      Head: Normocephalic.   Pulmonary:      Effort: Pulmonary effort is normal.   Musculoskeletal:         General: Normal range of motion.      Cervical back: Normal range of motion.   Skin:     General: Skin is warm and dry.   Neurological:      Mental Status: He is alert and oriented to person, place, and time.       Assessment:     Problem Noted   Erectile Dysfunction 5/3/2024       Plan:     Erectile Dysfunction  -  We discussed the etiology and management of ED, including organic and psychogenic causes. First line therapy involves treatment with PDE-5 inhibitors.  - We discussed the risks and benefits of treatment with PDE5i's. Treatment with RADHA was also briefly discussed.   - Next line therapies would include intraurethral suppository (125-1000 ug), then intracavernosal injections (1 ml trimix papaverine 30 mg, phentolamine 1 mg, prostaglandin E1 10 ug). Ultimately, a penile prosthesis would be the final option.   - We will initiate therapy with a PDE5i with Cialis 5mg daily.   - side effects of medication reviewed and patient verbalized understanding.  Follow-up PRN with questions or concerns    NIDHI Vilchis spent a total of 25 minutes on the day of the visit.This includes face to face time and non-face to face time preparing to see the patient (eg, review of tests), obtaining and/or reviewing separately obtained history, documenting clinical information in the electronic or other health record, independently interpreting results and communicating results to the patient/family/caregiver, or care coordinator.

## 2024-06-04 RX ORDER — AZELASTINE HYDROCHLORIDE, FLUTICASONE PROPIONATE 137; 50 UG/1; UG/1
1 SPRAY, METERED NASAL 2 TIMES DAILY
Qty: 23 G | Refills: 11 | Status: SHIPPED | OUTPATIENT
Start: 2024-06-04 | End: 2025-06-04

## 2024-07-01 ENCOUNTER — PATIENT MESSAGE (OUTPATIENT)
Dept: UROLOGY | Facility: CLINIC | Age: 34
End: 2024-07-01
Payer: COMMERCIAL

## 2024-07-26 ENCOUNTER — OFFICE VISIT (OUTPATIENT)
Dept: INTERNAL MEDICINE | Facility: CLINIC | Age: 34
End: 2024-07-26

## 2024-07-26 ENCOUNTER — CLINICAL SUPPORT (OUTPATIENT)
Dept: INTERNAL MEDICINE | Facility: CLINIC | Age: 34
End: 2024-07-26

## 2024-07-26 ENCOUNTER — HOSPITAL ENCOUNTER (OUTPATIENT)
Dept: CARDIOLOGY | Facility: CLINIC | Age: 34
Discharge: HOME | End: 2024-07-26

## 2024-07-26 VITALS
HEIGHT: 69 IN | WEIGHT: 171.44 LBS | BODY MASS INDEX: 25.39 KG/M2 | SYSTOLIC BLOOD PRESSURE: 114 MMHG | OXYGEN SATURATION: 97 % | DIASTOLIC BLOOD PRESSURE: 72 MMHG | HEART RATE: 71 BPM

## 2024-07-26 DIAGNOSIS — Z00.00 ROUTINE MEDICAL EXAM: ICD-10-CM

## 2024-07-26 DIAGNOSIS — R00.1 SINUS BRADYCARDIA ON ECG: ICD-10-CM

## 2024-07-26 DIAGNOSIS — Z00.00 ROUTINE GENERAL MEDICAL EXAMINATION AT A HEALTH CARE FACILITY: Primary | ICD-10-CM

## 2024-07-26 DIAGNOSIS — Z00.00 ANNUAL PHYSICAL EXAM: Primary | ICD-10-CM

## 2024-07-26 DIAGNOSIS — G89.29 CHRONIC LEFT SI JOINT PAIN: ICD-10-CM

## 2024-07-26 DIAGNOSIS — Z00.00 ENCOUNTER FOR SCREENING AND PREVENTATIVE CARE: Primary | ICD-10-CM

## 2024-07-26 DIAGNOSIS — M53.3 CHRONIC LEFT SI JOINT PAIN: ICD-10-CM

## 2024-07-26 DIAGNOSIS — K76.0 HEPATIC STEATOSIS: ICD-10-CM

## 2024-07-26 LAB
ALBUMIN SERPL BCP-MCNC: 4.4 G/DL (ref 3.5–5.2)
ALP SERPL-CCNC: 40 U/L (ref 55–135)
ALT SERPL W/O P-5'-P-CCNC: 24 U/L (ref 10–44)
ANION GAP SERPL CALC-SCNC: 7 MMOL/L (ref 8–16)
AST SERPL-CCNC: 15 U/L (ref 10–40)
BILIRUB SERPL-MCNC: 0.5 MG/DL (ref 0.1–1)
BUN SERPL-MCNC: 13 MG/DL (ref 6–20)
CALCIUM SERPL-MCNC: 9.5 MG/DL (ref 8.7–10.5)
CHLORIDE SERPL-SCNC: 106 MMOL/L (ref 95–110)
CHOLEST SERPL-MCNC: 144 MG/DL (ref 120–199)
CHOLEST/HDLC SERPL: 5.8 {RATIO} (ref 2–5)
CO2 SERPL-SCNC: 27 MMOL/L (ref 23–29)
CREAT SERPL-MCNC: 1 MG/DL (ref 0.5–1.4)
ERYTHROCYTE [DISTWIDTH] IN BLOOD BY AUTOMATED COUNT: 12.1 % (ref 11.5–14.5)
EST. GFR  (NO RACE VARIABLE): >60 ML/MIN/1.73 M^2
ESTIMATED AVG GLUCOSE: 94 MG/DL (ref 68–131)
GLUCOSE SERPL-MCNC: 95 MG/DL (ref 70–110)
HBA1C MFR BLD: 4.9 % (ref 4–5.6)
HCT VFR BLD AUTO: 48 % (ref 40–54)
HDLC SERPL-MCNC: 25 MG/DL (ref 40–75)
HDLC SERPL: 17.4 % (ref 20–50)
HGB BLD-MCNC: 16.1 G/DL (ref 14–18)
LDLC SERPL CALC-MCNC: 93.6 MG/DL (ref 63–159)
MCH RBC QN AUTO: 30.4 PG (ref 27–31)
MCHC RBC AUTO-ENTMCNC: 33.5 G/DL (ref 32–36)
MCV RBC AUTO: 91 FL (ref 82–98)
NONHDLC SERPL-MCNC: 119 MG/DL
OHS QRS DURATION: 90 MS
OHS QTC CALCULATION: 370 MS
PLATELET # BLD AUTO: 253 K/UL (ref 150–450)
PMV BLD AUTO: 9.3 FL (ref 9.2–12.9)
POTASSIUM SERPL-SCNC: 4.5 MMOL/L (ref 3.5–5.1)
PROT SERPL-MCNC: 7 G/DL (ref 6–8.4)
RBC # BLD AUTO: 5.3 M/UL (ref 4.6–6.2)
SODIUM SERPL-SCNC: 140 MMOL/L (ref 136–145)
TRIGL SERPL-MCNC: 127 MG/DL (ref 30–150)
TSH SERPL DL<=0.005 MIU/L-ACNC: 1.81 UIU/ML (ref 0.4–4)
WBC # BLD AUTO: 3.24 K/UL (ref 3.9–12.7)

## 2024-07-26 PROCEDURE — 99999 PR PBB SHADOW E&M-EST. PATIENT-LVL I: CPT | Mod: PBBFAC,,,

## 2024-07-26 PROCEDURE — 80061 LIPID PANEL: CPT | Performed by: EMERGENCY MEDICINE

## 2024-07-26 PROCEDURE — 93010 ELECTROCARDIOGRAM REPORT: CPT | Mod: S$GLB,,, | Performed by: INTERNAL MEDICINE

## 2024-07-26 PROCEDURE — 99395 PREV VISIT EST AGE 18-39: CPT | Mod: S$GLB,,, | Performed by: EMERGENCY MEDICINE

## 2024-07-26 PROCEDURE — 80053 COMPREHEN METABOLIC PANEL: CPT | Performed by: EMERGENCY MEDICINE

## 2024-07-26 PROCEDURE — 99211 OFF/OP EST MAY X REQ PHY/QHP: CPT | Mod: S$GLB,,, | Performed by: INTERNAL MEDICINE

## 2024-07-26 PROCEDURE — 97802 MEDICAL NUTRITION INDIV IN: CPT | Mod: S$GLB,,, | Performed by: DIETITIAN, REGISTERED

## 2024-07-26 PROCEDURE — 84443 ASSAY THYROID STIM HORMONE: CPT | Performed by: EMERGENCY MEDICINE

## 2024-07-26 PROCEDURE — 99999 PR PBB SHADOW E&M-EST. PATIENT-LVL III: CPT | Mod: PBBFAC,,, | Performed by: EMERGENCY MEDICINE

## 2024-07-26 PROCEDURE — 97750 PHYSICAL PERFORMANCE TEST: CPT | Mod: S$GLB,,, | Performed by: INTERNAL MEDICINE

## 2024-07-26 PROCEDURE — 83036 HEMOGLOBIN GLYCOSYLATED A1C: CPT | Performed by: EMERGENCY MEDICINE

## 2024-07-26 PROCEDURE — 85027 COMPLETE CBC AUTOMATED: CPT | Performed by: EMERGENCY MEDICINE

## 2024-07-26 PROCEDURE — 93005 ELECTROCARDIOGRAM TRACING: CPT | Mod: S$GLB,,, | Performed by: EMERGENCY MEDICINE

## 2024-07-26 NOTE — PROGRESS NOTES
[facility]      TODAY'S Date 7/26/2024  Patient ID: Dong Denny is a 33 y.o. male   MRN: [id number]  Primary Care Physician (PCP):  Ashley Arriaza MD    SUBJECTIVE     Chief Complaint:   Chief Complaint   Patient presents with    On license of [facility]      HPI:   Reviewed medical, surgical, social and family history, medications, appropriate preventive health screenings, as well as vaccination history. Updates as noted below or in assessment and plan.    This is a very pleasant 33 y.o. nonsmoking male with PMHx androgenetic alopecia, HLD.  Although presenting for his usual annual exam in On license of [facility], he is a new patient to me.  The patient is currently in good health, with the exception of intermittent L hip pain. States he has had problems with his SI joint 'for awhile.' He previously did physical therapy for it but went on vacation and did not finish the problem. This does not impede him currently from working. At this time, he works at [employer] as an  staring up new projects.  This can have some long stressful day.  In his free time, he enjoys video games, gardening and hanging out with his wife 1.5 years.  He does not yet have children. Currently his exercise routine includes biking and lifting weights 3-4x week.  Patient does not follow any formal flexibility routine at the current time. His goal for 2024 is to increase stretching and  physical therapy sessions. The patient's sleep schedule is consistent, with a bedtime of 10:00 p.m. and wake-up time of 7:00 a.m..  He feels generally rested in the morning but tired by the afternoon despite not snoring or needing to nap.  He avoids caffeniated beverages but may have tea on occasion.  He keeps generally hydrated otherwise and may have 3-4 beers once a month.  In terms of diet, the patient typically skips breakfast. Lunch usually consists of protein shake, greek yogurt and/or  other protein, and dinner is meal prepped on the weekend.The patient avoids snacking. He recently started clomiphine and other meds to help with fertility. There is no report of herbal medications, dietary supplements, foreign travel, fever/chills, recent trauma, hearing loss, tinnitus, noise exposure, cough, sneezing, dizziness, tingling, clumsiness, numbness, n/v/d/c, abdominal pain, IV drug use, or loss of bowel or bladder control.     SCREENING:   Prostate:    n/a as < 50 years old and no family history  Colonoscopy:    n/a as < 45 years old and no family history colon cancer  DEXA:     N/a as < 70 years old and little to no clinical risk factors for fracture independent of bone mineral density   Depression:    Negative   Anxiety:    Negative  Medications:    Reviewed and Reconciled  Eye Exam:    UTD, wears corrective lens  Dental Exam:    Routine q 6 months   Ear Exam:    + hearing loss. No complaints of tinnitus, noise exposure, or vertigo   Skin:     No new concerning moles or lesions   Routinely uses sunscreen on face. Long sleeves when cutting grass.  Negative for recent sunburn, moderate sun exposure in the past, several blistering sunburns.  No history of tanning bed use or melanoma in first degree relatives.  Pt has had moles removed in the past: yes, benign (L arm/shoulder)   Sex:     Monogamous relationship and trying to get pregnant  Transportation safety:   Uses restraint consistently, does not drink alcohol before or while driving  Firearm safety:   Stores guns safely  Lung cancer risk/Tobacco use:   Tobacco Use: Low Risk  (5/3/2024)    Patient History     Smoking Tobacco Use: Never     Smokeless Tobacco Use: Never     Passive Exposure: Not on file     A review of medical records indicates patient has seen the following specialists:   Urology - Alisa Quinn FNP  Allergy - Trent Romero MD  Otolaryngology - Salome Mckenna MD    Immunization History   Administered Date(s) Administered     COVID-19 Vaccine 2020, 2021    COVID-19, MRNA, LN-S, PF (MODERNA FULL 0.5 ML DOSE) 2020, 2021    COVID-19, MRNA, LN-S, PF (Pfizer) (Purple Cap) 2021    Hepatitis B 10/16/1998, 1998, 1999    Influenza - Quadrivalent - PF *Preferred* (6 months and older) 10/04/2022, 2023    Tdap 2016     Past Medical History:   Diagnosis Date    Androgenetic alopecia     Chronic rhinosinusitis     Dyslipidemia     Kidney stones     Low testosterone      Past Surgical History:   Procedure Laterality Date    TOE SURGERY Left     TONSILLECTOMY      TURBINATE RESECTION Bilateral ,     done twice    TYMPANOSTOMY TUBE PLACEMENT Bilateral      Family History   Problem Relation Name Age of Onset    Celiac disease Mother      Diabetes Father      No Known Problems Sister      No Known Problems Maternal Grandmother      No Known Problems Maternal Grandfather      No Known Problems Paternal Grandmother      No Known Problems Paternal Grandfather       Social History     Tobacco Use    Smoking status: Never    Smokeless tobacco: Never   Substance Use Topics    Alcohol use: Yes     Alcohol/week: 2.0 standard drinks of alcohol     Types: 2 Standard drinks or equivalent per week     Comment: socially    Drug use: No     Past Medical, Surgical and Social history reviewed and verified by me.     Review of patient's allergies indicates:  No Known Allergies  Current Outpatient Medications on File Prior to Visit   Medication Sig Dispense Refill    anastrozole (ARIMIDEX) 1 mg Tab Take 1 mg by mouth.      azelastine-fluticasone (DYMISTA) 137-50 mcg/spray Spry nassal spray 1 spray by Each Nostril route 2 (two) times daily. 23 g 11    chorionic gonadotropin, human (PREGNYL) 10,000 unit injection SMARTSI Unit(s) SUB-Q Twice a Week      clomiPHENE (CLOMID) 50 mg tablet Take 50 mg by mouth once daily.      levocetirizine (XYZAL) 5 MG tablet Take 1 tablet (5 mg total) by mouth every  "evening. 30 tablet 11    tadalafiL (CIALIS) 5 MG tablet Take 1 tablet (5 mg total) by mouth daily as needed for Erectile Dysfunction. 30 tablet 11     No current facility-administered medications on file prior to visit.       Review of Systems as per HPI  Review of Systems   Musculoskeletal:  Positive for back pain (intermittent) and joint pain (left hip). Negative for falls, myalgias and neck pain.   Constitutional: Negative for activity change, appetite change, diaphoresis, chills and fever.   Respiratory: Negative for apnea, choking, chest tightness and wheezing.  Genitourinary: Negative for hematuria, flank pain, frequency and dysuria.   Skin: Negative for color change, pallor, rash and wound. Patient denies concerning moles or lesions.  Psychiatric/Behavioral: Negative for agitation, behavioral problems, confusion, decreased concentration and dysphoric mood.     All other systems reviewed and are negative.    OBJECTIVE     PHYSICAL EXAM  Vitals:    07/26/24 0954   BP: 114/72   Pulse: 71   SpO2: 97%   Weight: 77.7 kg (171 lb 6.5 oz)   Height: 5' 9" (1.753 m)     Vital Signs (Most Recent):  Pulse: 71 (07/26/24 0954)  BP: 114/72 (07/26/24 0954)  SpO2: 97 % (07/26/24 0954)   Weight:   Wt Readings from Last 1 Encounters:   07/26/24 77.7 kg (171 lb 6.5 oz)     Body mass index is 25.31 kg/m².      Vital signs and nursing assessment noted: relatively normal vitals    GEN:   NAD, A & Ox3, atraumatic, well appearing, nontoxic appearing  HEENT:  PERRLA, EOMI, moist membranes, nl conjunctiva, no scleral icterus, no nystagmus, no nodes/nodules, soft, supple, FROM, no trachial deviation, nexus negative, normal TMs bilaterally, normal pharynx  CV:   RRR no m/r/g, 2+ radial pulses, <2sec cap refill, no obvious JVD  RESP:  CTA B, no w/r/r, equal and bilateral chest rise, no respiratory distress  ABD:   soft, Nontender, Nondistended, +BS, no guarding/rebound  :   Deferred  BACK:  FROM, no midline tenderness, no paraspinal " tenderness  EXT:   FROM, JEFF x 4, no swelling, no edema, no calf tenderness, no bony tenderness, no warmth or redness, no crepitus, no obvious deformity  LYMPH:  no gross adenopathy  NEURO:  GCS 15, CN II-XII grossly intact, no obvious motor/sensory deficit, no tremor, negative Romberg,  nl gait/coordination  PSYCH:   Cooperative, no SI/HI, no anxiety, nl mood/affect, nl judgement/thought process  SKIN:  no rashes/lesions or masses, nl color, no pallor, warm, dry, intact    Tests      Results for orders placed or performed during the hospital encounter of 07/26/24   EKG 12-lead    Collection Time: 07/26/24  8:42 AM   Result Value Ref Range    QRS Duration 90 ms    [facility] QTC Calculation 370 ms    Narrative    Test Reason : Z00.00,    Vent. Rate : 059 BPM     Atrial Rate : 059 BPM     P-R Int : 184 ms          QRS Dur : 090 ms      QT Int : 374 ms       P-R-T Axes : 005 051 030 degrees     QTc Int : 370 ms    Sinus bradycardia  Otherwise normal ECG  When compared with ECG of 22-SEP-2023 07:56,  No significant change was found  Confirmed by Regina Kerr MD ([id number]) on 7/26/2024 12:18:41 PM    Referred By: LEÓN NOONAN           Confirmed By:Regina Kerr MD      Labs reviewed and independently interpreted. Imaging studies reviewed.    Latest Reference Range & Units 09/19/16 09:44 06/19/17 07:33 09/12/18 08:14 08/26/19 08:39 07/09/20 08:07 10/04/22 09:23 09/22/23 07:44   Cholesterol Total 120 - 199 mg/dL 122 153 144 150 153 160 187   HDL 40 - 75 mg/dL 37 (L) 44 33 (L) 40 36 (L) 29 (L) 24 (L)   HDL/Cholesterol Ratio 20.0 - 50.0 % 30.3 28.8 22.9 26.7 23.5 18.1 (L) 12.8 (L)   Non-HDL Cholesterol mg/dL 85 109 111 110 117 131 163   Total Cholesterol/HDL Ratio 2.0 - 5.0  3.3 3.5 4.4 3.8 4.3 5.5 (H) 7.8 (H)   Triglycerides 30 - 150 mg/dL 97 131 76 105 136 211 (H) 281 (H)   LDL Cholesterol 63.0 - 159.0 mg/dL 65.6 82.8 95.8 89.0 89.8 88.8 106.8      Latest Reference Range & Units 09/22/23 07:44   Hepatitis C Ab Non-reactive   Non-reactive      Latest Reference Range & Units 09/19/16 09:44 06/19/17 07:33   HIV 1/2 Ag/Ab Negative  Negative Negative       Recent Results (from the past 2016 hour(s))   Comprehensive Metabolic Panel    Collection Time: 07/26/24  8:34 AM   Result Value Ref Range    Sodium 140 136 - 145 mmol/L    Potassium 4.5 3.5 - 5.1 mmol/L    Chloride 106 95 - 110 mmol/L    CO2 27 23 - 29 mmol/L    Glucose 95 70 - 110 mg/dL    BUN 13 6 - 20 mg/dL    Creatinine 1.0 0.5 - 1.4 mg/dL    Calcium 9.5 8.7 - 10.5 mg/dL    Total Protein 7.0 6.0 - 8.4 g/dL    Albumin 4.4 3.5 - 5.2 g/dL    Total Bilirubin 0.5 0.1 - 1.0 mg/dL    Alkaline Phosphatase 40 (L) 55 - 135 U/L    AST 15 10 - 40 U/L    ALT 24 10 - 44 U/L    eGFR >60.0 >60 mL/min/1.73 m^2    Anion Gap 7 (L) 8 - 16 mmol/L   TSH    Collection Time: 07/26/24  8:34 AM   Result Value Ref Range    TSH 1.807 0.400 - 4.000 uIU/mL   Hemoglobin A1C    Collection Time: 07/26/24  8:34 AM   Result Value Ref Range    Hemoglobin A1C 4.9 4.0 - 5.6 %    Estimated Avg Glucose 94 68 - 131 mg/dL   CBC Without Differential    Collection Time: 07/26/24  8:34 AM   Result Value Ref Range    WBC 3.24 (L) 3.90 - 12.70 K/uL    RBC 5.30 4.60 - 6.20 M/uL    Hemoglobin 16.1 14.0 - 18.0 g/dL    Hematocrit 48.0 40.0 - 54.0 %    MCV 91 82 - 98 fL    MCH 30.4 27.0 - 31.0 pg    MCHC 33.5 32.0 - 36.0 g/dL    RDW 12.1 11.5 - 14.5 %    Platelets 253 150 - 450 K/uL    MPV 9.3 9.2 - 12.9 fL   Lipid Panel    Collection Time: 07/26/24  8:34 AM   Result Value Ref Range    Cholesterol 144 120 - 199 mg/dL    Triglycerides 127 30 - 150 mg/dL    HDL 25 (L) 40 - 75 mg/dL    LDL Cholesterol 93.6 63.0 - 159.0 mg/dL    HDL/Cholesterol Ratio 17.4 (L) 20.0 - 50.0 %    Total Cholesterol/HDL Ratio 5.8 (H) 2.0 - 5.0    Non-HDL Cholesterol 119 mg/dL   EKG 12-lead    Collection Time: 07/26/24  8:42 AM   Result Value Ref Range    QRS Duration 90 ms    OHS QTC Calculation 370 ms      CT RENAL STONE  April 2024 Impression:  Multiple  nonobstructing renal stones bilaterally measuring up to 5 mm.  No hydronephrosis.  Hepatic steatosis.  New 1.5 cm fat containing lesion adjacent to the descending colon, concerning for epiploic appendagitis.     XR LUMBAR SPINE 2 OR 3 VIEWS Sept 2023 FINDINGS:  The lumbosacral disc space is slightly narrowed.  The other disc spaces are well maintained.  No fracture, spondylolisthesis or bone destruction     XR SACRUM AND COCCYX Sept 2023 FINDINGS:  No fracture or dislocation.  No bone destruction identified.  The SI joints are within normal limits.identified    CT UROGRAM ABD PELVIS W WO Oct 2022 Impression:  Bilateral nonobstructing renal calculi  Several bilateral small renal hypodensities which are too small to characterize but likely represent cysts.    CT UROGRAM ABD PELVIS W WO Oct 2022 Impression:  Bilateral nonobstructing renal calculi  Several bilateral small renal hypodensities which are too small to characterize but likely represent cysts.    X-Ray Foot Complete Left Aug 2017 Findings:  Since the prior examination the patient has undergone hammertoe release at the PIP of the little toe.  I detect no other significant change.   Lisfranc articulation is congruent.  Talar dome is maintained.    ASSESSMENT:     1. Encounter for screening and preventative care    2. Hepatic steatosis    3. Chronic left SI joint pain    4. Sinus bradycardia on ECG       Health Maintenance   Topic Date Due    TETANUS VACCINE  09/19/2026    Lipid Panel  07/26/2029    Hepatitis C Screening  Completed     33 y.o. nonsmoking  male PMHx androgenetic alopecia with hypotestosteronism on anastrozole, hcg, clomiphene, chronic rhinosinusitis on levocetirizine, azelastine-fluticasone presents for annual exam in [facility]. Patient is complaining of L hip pain and would like to continue physical therapy. Functionally, the patient does not report limitations due to his symptoms. Home medications including tadalafil and health  maintenance reviewed.  Exam is relatively benign.  No obvious skin lesions suspicious for malignancy noted. Immunization status: up to date and documented. Scheduled cancer screenings completed.  MDM  Reviewed: previous chart, nursing note and vitals  Reviewed previous: labs, ECG, x-ray and CT scan  Interpretation: labs (low HDL and WBC otherwise relatively unremarkable Lipid Panel, CMP, CBC, TSH, HgA1C, HIV, Hep C ab)      PLAN:   Periodic health maintenance visits annually or sooner with concerns. Consider audiograms every 3-5 years or sooner with concerns.  Vaccinations to be obtained at local pharmacy or with PCP Ashley Arriaza MD  Routine labs CMP, CBC, Lipid, HgA1C, TSH   Continue current medications. Follow up with fertility clinic.  Encouraged healthy eating, exercise, weight management,  flexibility routine possibly in the form of yoga and avoidance of alcohol  Recommend a high fiber, lean protein diet that is high in complex carbohydrates such as non-starchy vegetables and whole grains.   Recommend 150 minutes of moderate-intensity physical activity and 2 days of muscle strengthening activity weekly.  Recommend hearing protection when in noisy environments.   Recommend daily sun protection/avoidance, use of at least SPF 30, broad spectrum sunscreen (OTC drug), and routine physician surveillance to optimize early detection.  Recommend hearing protection when in noisy environments.   For asymptomatic patients with sinus bradycardia, treatment is neither indicated nor required.    Consider Rheum evaluation.  Orders Placed This Encounter   Procedures    Ambulatory referral/consult to Physical/Occupational Therapy     The results of physical exam findings, labs, and imaging were reviewed with the patient. Management of above assessments/visit diagnoses was discussed with patient. Precautions for return discussed at length. Patient was given ample time for questions. All questions asked and answered to the  satisfaction of the patient. Patient is in agreement with the above and verbalized understanding. Total time spent caring for the patient today was 30 minutes. This includes time spent before the visit reviewing the chart, time spent during the visit, and time spent after the visit on documentation.    Ayla Donahue MD  [facility]    Disclaimer: This document was created using voice recognition software (M*Modal Fluency Direct). Although it may be edited, this document may contain errors related to incorrect recognition of the spoken word. Please contact the physician if clarification is needed.

## 2024-07-26 NOTE — PROGRESS NOTES
Nutrition Assessment  Session Time:  45 minutes      Client name:  Dong Denny  :  1990  Age:  33 y.o.  Gender:  male    Client states:  Very pleasant [employer] Employee here today for annual [facility]  physical and nutrition assessment. Patient is . He works as a  for [employer]. 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 He does preprep meals to avoid eating out. He is completing a caffeine fast starting in July. He was taking in 300 - 400 mg a day. He avoids high oxalate foods due to kidney stones. He was documenting his intake using myfitness pal for a few years but stopped and noticed he gained weight. He is currently tracking his calories again w/ daily goal of 1750 calories and 100 gm Protein. He continues to follow an exercise routine 4 x a week. Past labs discussed. Current labs are pending due to system issues. His nutrition goal is to dec trig levels, and %body weight.     Anthropometrics  Height:  69  inches     Weight: 168  lbs  BMI:   25   % Body Fat: 22.3    Clinical Signs/Symptoms  N/V/D:  None  Appetite:  good       Past Medical History:   Diagnosis Date    Androgenetic alopecia     Chronic rhinosinusitis     Dyslipidemia     Kidney stones     Low testosterone        Past Surgical History:   Procedure Laterality Date    TOE SURGERY Left 2015    TONSILLECTOMY      TURBINATE RESECTION Bilateral ,     done twice    TYMPANOSTOMY TUBE PLACEMENT Bilateral        Medications    has a current medication list which includes the following prescription(s): anastrozole, azelastine-fluticasone, chorionic gonadotropin, human, clomiphene, levocetirizine, and tadalafil.    Vitamins, Minerals, and/or Supplements:  None    Food/Medication Interactions:  Reviewed     Food Allergies or Intolerances:  NKFA but avoids Oxalate foods due to kidney stones.     Social History    Marital status:    Employment:  [employer]  -  for [employer].     Social History     Tobacco Use    Smoking status: Never    Smokeless tobacco: Never   Substance Use Topics    Alcohol use: Yes     Alcohol/week: 2.0 standard drinks of alcohol     Types: 2 Standard drinks or equivalent per week     Comment: socially        Lab Reports   Sodium   Date Value Ref Range Status   07/26/2024 140 136 - 145 mmol/L Final     Potassium   Date Value Ref Range Status   07/26/2024 4.5 3.5 - 5.1 mmol/L Final     Chloride   Date Value Ref Range Status   07/26/2024 106 95 - 110 mmol/L Final     CO2   Date Value Ref Range Status   07/26/2024 27 23 - 29 mmol/L Final     Glucose   Date Value Ref Range Status   07/26/2024 95 70 - 110 mg/dL Final     BUN   Date Value Ref Range Status   07/26/2024 13 6 - 20 mg/dL Final     Creatinine   Date Value Ref Range Status   07/26/2024 1.0 0.5 - 1.4 mg/dL Final     Calcium   Date Value Ref Range Status   07/26/2024 9.5 8.7 - 10.5 mg/dL Final     Total Protein   Date Value Ref Range Status   07/26/2024 7.0 6.0 - 8.4 g/dL Final     Albumin   Date Value Ref Range Status   07/26/2024 4.4 3.5 - 5.2 g/dL Final   09/25/2017 4.9 3.6 - 5.1 g/dL Final     Comment:     @ Test Performed By:  [facility] Esa Garner M.D., [place],   [address] [place] [id number]  [facility]  [id number]       Total Bilirubin   Date Value Ref Range Status   07/26/2024 0.5 0.1 - 1.0 mg/dL Final     Comment:     For infants and newborns, interpretation of results should be based  on gestational age, weight and in agreement with clinical  observations.    Premature Infant recommended reference ranges:  Up to 24 hours.............<8.0 mg/dL  Up to 48 hours............<12.0 mg/dL  3-5 days..................<15.0 mg/dL  6-29 days.................<15.0 mg/dL       Alkaline Phosphatase   Date Value Ref Range Status   07/26/2024 40 (L) 55 - 135 U/L Final     AST   Date Value Ref Range Status    07/26/2024 15 10 - 40 U/L Final     ALT   Date Value Ref Range Status   07/26/2024 24 10 - 44 U/L Final     Anion Gap   Date Value Ref Range Status   07/26/2024 7 (L) 8 - 16 mmol/L Final     eGFR if    Date Value Ref Range Status   07/09/2020 >60.0 >60 mL/min/1.73 m^2 Final     eGFR if non    Date Value Ref Range Status   07/09/2020 >60.0 >60 mL/min/1.73 m^2 Final     Comment:     Calculation used to obtain the estimated glomerular filtration  rate (eGFR) is the CKD-EPI equation.         Lab Results   Component Value Date    WBC 3.24 (L) 07/26/2024    HGB 16.1 07/26/2024    HCT 48.0 07/26/2024    MCV 91 07/26/2024     07/26/2024        Lab Results   Component Value Date    CHOL 144 07/26/2024     Lab Results   Component Value Date    HDL 25 (L) 07/26/2024     Lab Results   Component Value Date    LDLCALC 93.6 07/26/2024     Lab Results   Component Value Date    TRIG 127 07/26/2024     Lab Results   Component Value Date    CHOLHDL 17.4 (L) 07/26/2024     Lab Results   Component Value Date    HGBA1C 4.9 07/26/2024     BP Readings from Last 1 Encounters:   07/26/24 114/72       Food History  Meal Pattern: 1-2 meals daily and 2 meals daily  Breakfast:  skips  Mid-morning Snack: bananas at work   Lunch:  Greek yogurt, protein bar, protein shake (ON Whey pro 24 gm/pro scoop) from 12 to 3 pm.  Mid-afternoon Snack:  varies  Dinner:  cooks at home. Shrimp fried rice with peas and carrots; Usually eats 4 oz protein at dinner; veggies peas, carrots, green beans,   Snack:  none  *Fluid intake:  Drinks: 3-4 32 oz bottles of water; No caffeine currently;     Exercise History: Patient currently cycles at a vigorous intensity pace for 30 minutes, 4 days a week. Patient practices upper and lower body resistance training exercises, each 2 days a week and core strengthening exercises, 4 days a week. Patient does not follow any formal flexibility routine at the current time. Fitness - Goals:   Patient set a goal weight between 150-155 lbs and body fat % goal of 15% or below.         Cultural/Spiritual/Personal Preferences:  None identified    Support System:  spouse    State of Change:  Preparation      Barriers to Change:  None identified    Diagnosis    No nutrition diagnosis identified.     Intervention    RMR (Method:  InBody):  1653 kcal  Activity Factor:  1.3    MARINO:  2150 kcal    Goals:  1.  Continue to document intake using myfitnesspal.   2.  Add snack to morning routine. May be fruit w/ protein source.   3.  Continue healthy eating. Trig - 127 dec from 281 (9/23/23).   4.  Continue to Limit foods high in oxalate (a compound found naturally in some foods) to reduce the risk of kidney stone or if you had kidney stones in the past. Some foods that are high in oxalate include:  Protein Foods: Beans, tofu, nuts  Grains: Wheat bran, wheat germ, whole wheat flour  Vegetables: Beets, dark leafy greens, sweet potato  Fruit: Berries, kiwi, rhubarb, dried figs  Beverages: Beer, cocoa, instant tea, instant coffee  Other: Carob, chocolate    Nutrition Education  The following education was provided to the patient:  Complimented patient on proactive role in health maintenance.  Complimented patient on dietary compliance/modifications and resulting health improvements.  Complimented patient on physical activity efforts.  Suggested dietary modifications based on current dietary behaviors and individual food preferences.  *Lab results were pending at time of consult and so, not discussed with patient.  Discussed vitamin/mineral recommendations (may include but not limited to MVI, Vitamin D, Calcium, and/or Iron) and associated food sources.  Discussed caffeine intake (recommended intake, potential health consequences of excessive caffeine, sources of caffeine, etc.).  Provided ongoing support, encouragement, and guidance toward improved health efforts.    Patient verbalized understanding of nutrition education  and recommendations received.    Handouts Provided   None    [facility] Additional Handouts provided:  No additional information provided.      Monitoring/Evaluation    Monitor the following:  Weight  BMI  % Body Fat  Caloric intake  Labs:  Annual - CBC, CMP, A1C, LIPID PROFILE - HDL, LDL, TRIG, CHOL    Follow Up Plan:  Communication with referring healthcare provider is unnecessary at this time as patient presented as part of annual wellness exam.  However, will follow up with patient in 1-2 years.

## 2024-07-26 NOTE — PROGRESS NOTES
"Pt. Has no significant cardiovascular or pulmonary history.    Physical Limitations:  Patient has a "slipped disc" in his lumber spine from deadlifting in 2018.  He still avoids deadlifts and back-weighted exercise such as squats.  For the last year, patient has been experiencing left SI joint pain when running for which he went to physical therapy.  Patient is limited from running and chose to defer the core endurance test after attempting and feeling pain.  He plans to ask his physician for another referral to physical therapy.      Current exercise routine:  Patient currently cycles at a vigorous intensity pace for 30 minutes, 4 days a week.  Patient practices upper and lower body resistance training exercises, each 2 days a week and core strengthening exercises, 4 days a week.  Patient does not follow any formal flexibility routine at the current time.    Goals:  Patient set a goal weight between 150-155 lbs and body fat % goal of 15% or below.    Notes:  Patient was very friendly and engaged.  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  He recently "cleaned up his diet" and started a mini cut about 3 weeks ago.  Patient asked questions and was receptive to all recommendations.      The fitness evaluation results are as follows:    D.O.S. 7/26/2024 7/9/2020 6/19/2017   Height (in): 69 69 69   Weight (lbs): 168.4 153.7 162   BMI: 24.39419 22.446413 23.955263   Body Fat (%): 22.30 18.40 17.85   Waist (cm): 87 84 80   RBP (mmHg): 124/76 108/72 110/78   RHR (bpm): 60 60 62    Strength Dominant (Lbs): 93 88.033670 96.004951    Strength Non Dominant (Lbs): 95 98.194097 85.481219   Push-up Assessment: 26 38 43   Curl-up Assessment: Deferred 40 33   Flexibility Testing (cm): 32 36 40   REE (kcals): 1653 1600 1760       Age/gender stratified assessment:    Resting BP: Within Normal Limits   Body Fat %: Good   Waist " Circumfernece: Low Risk    Strength Dominant: 25th    Strength Non Dominant: 25th   Upper Body Endurance: Very Good   Abdominal Endurance: Deferred   Lower body Flexibiltiy: Good       Recommended fitness guidelines:    -150 minutes of moderate intensity aerobic exercise per week or 75 minutes of vigorous intensity aerobic exercise per week.    -2 to 4 days per week of resistance training for each muscle group.   Make sure you are keeping your body challenged and progressing your strength training routine by adding sets, repetitions, weight, time under tension, or switching up an exercise about every 6-8 weeks or once an exercise starts to feel easy for you.      -Daily stretching with a hold of at least 30 seconds per muscle group.

## 2024-08-16 ENCOUNTER — CLINICAL SUPPORT (OUTPATIENT)
Dept: REHABILITATION | Facility: HOSPITAL | Age: 34
End: 2024-08-16
Attending: EMERGENCY MEDICINE
Payer: COMMERCIAL

## 2024-08-16 DIAGNOSIS — G89.29 CHRONIC LEFT SI JOINT PAIN: ICD-10-CM

## 2024-08-16 DIAGNOSIS — M53.3 CHRONIC LEFT SI JOINT PAIN: ICD-10-CM

## 2024-08-16 DIAGNOSIS — G89.29 LUMBOSACRAL PAIN, CHRONIC: Primary | ICD-10-CM

## 2024-08-16 DIAGNOSIS — M54.50 LUMBOSACRAL PAIN, CHRONIC: Primary | ICD-10-CM

## 2024-08-16 NOTE — PLAN OF CARE
[facility]  Physical Therapy Initial Evaluation    Name: Dong Denny  Clinic Number: [id number]    Therapy Diagnosis:   Encounter Diagnoses   Name Primary?    Chronic left SI joint pain     Lumbosacral pain, chronic Yes     Physician: Ayla Donahue MD    Physician Orders: PT Eval and Treat   Medical Diagnosis: M53.3,G89.29 (ICD-10-CM) - Chronic left SI joint pain   Evaluation Date: 8/16/2024  Authorization Period Expiration: 12/31/2024  Plan of Care Certification Period: 8/16/2024 to 11/10/2024  Visit # / Visits authorized: 1/TBD  FOTO: 1/5  PTA visits: 0/5    Time In: 1000  Time Out: 1050  Total Billable Time: 45 minutes (1 LCE)  Precautions: Standard    Subjective   Dong is a 32 y/o WM. Med hx: kidney stones, DLD. Primary complaint of chronic left SIJ area pain. Has had this issue at a low-level for several years.  In the past was running more often for exercise. Mileage: running 2-3 miles with the occasional 5K.  Then to TM and incr'd his mileage. Began consistently having left SIJ pain about a mile into his runs.  Last run was Dec 2023. Has swapped to more cycling, cross training without pain. But he would like to return to running. Now occasional getting sharp SIJ feeling when he walks his dog. Feels it when he pushes his leg down to push-off during gait. Also, aggravated by sit-up test.  Does voice hx of left sciatica; usually a lateral referral pattern that will extend to his leg/foot. 1-2 episodes per/year.  Initially aggravated doing loading squatting, dead lift at the gym. He avoids these exercises now.        Past Medical History:   Diagnosis Date    Androgenetic alopecia     Chronic rhinosinusitis     Dyslipidemia 2022    Kidney stones     Low testosterone      Dong Denny  has a past surgical history that includes Tonsillectomy; Turbinate resection (Bilateral, 2010, 2006); Tympanostomy tube placement (Bilateral, 1990); and Toe Surgery (Left,  "2015).    Dong has a current medication list which includes the following prescription(s): anastrozole, azelastine-fluticasone, chorionic gonadotropin, human, clomiphene, levocetirizine, and tadalafil.    Review of patient's allergies indicates:  No Known Allergies     Imaging:   Lumbar radiographs: Findings: "The lumbosacral disc space is slightly narrowed. The other disc spaces are well maintained. No fracture, spondylolisthesis or bone destruction identified."    Sacral radiographs: Findings: "No fracture or dislocation. No bone destruction identified. The SI joints are within normal limits."    Prior Therapy:  Yes, but did not complete  Social History:  Lives with family.    Occupation:    for [employer]  Prior Level of Function:  See above  Current Level of Function:  See above    Pain:  Current 1/10, worst 5/10, best 0/10   Location: left SIJ area  Description: Dull and Tight    Pts goals:  1. To be rid of his pain    2. Would like to return to running program.     Objective     Palpation:  (+) Elaine's sign left. No TTP lateral sacral border left  Posture:   Flattening of lumbar lordosis    Gross movement analysis:  -Gait:   Non-antalgic  -Running TM assessment: Pain left SIJ area. Abolished pain with running on incline grade  -Forward bending:  Good lumbopelvic rhythm with appropriate depth  -Squat:  Able to achieve full depth    Lumbar motor control battery:  Seated leg extension:   (+) pain L  Standing posterior pelvic tilt:  (+)  Prone knee flexion:   (-)  Prone hip extension   (+)  Hook-lying hip abd/external rotation:  (-)  Standing overhead shoulder flexion against wall: within normal limits: (-)    Lumbar Range of Motion:    Degrees   Flexion WNL   Extension WNL   Left Side Bending WNL   Right Side Bending WNL   Left rotation WNL   Right Rotation   WNL      Range of Motion:   LE Right Left   Hip flexion 110; tight 110; tight   Hip extension WNL WNL   Hip abduction WNL WNL   Hip ER WNL WNL   Hip IR "  WNL WNL   Knee WNL WNL   Ankle 15 knee flexed,   5 knee extended 15 knee flexed,   5 knee extended       Lower Extremity Strength   Right Left   Hip flexion: 5/5 5/5   Hip extension: 5/5 5/5   Hip abduction: 5/5 5/5   Hip adduction 5/5 5/5   Hip ExR/IntR 5/5 5/5   Knee extension: 5/5 5/5   Knee flexion: 5/5 5/5   Ankle dorsiflexion: 5/5 5/5   Great toe extension: 5/5 5/5     Special Tests:  -Repeated Flexion:  No change  -Repeated Ext:  Increased SIJ pain  -SLR neural tension test:  + left    - Hip clearing test:  (-) bilateral     Rajni et al SIJ CPR:  Thigh thrust test:  -  SI compression:  -  SI distraction:  -  Gaelslen's test:  -  Sacral thrust:   (+)   (or FABERs)  -  Excluding centralization --> Sn 91%, Sp 87%  LR+ 6.97, LR- 0.11    DTR:   Right Left   Patellar (L3-4) 2+ 2+   Achilles (S1) 2+ 2+     Joint Mobility:  Pain with P-to-A mobs of L5    Sensation:  Intact light touch sensation to BLE through L2-S2 dermatomal distribution      CMS Impairment/Limitation/Restriction for FOTO Lumbar Survey    Therapist reviewed FOTO scores for Dong Denny on 8/16/2024.   FOTO documents entered into [employer] - see Media section.    Limitation Score: 8% --> 2%  LILY: 0%         TREATMENT   Not today.   Next: lumbar stabilization program, trial of traction.     Home Exercises and Patient Education Provided  Education provided re:   -  Therapy diagnosis and recommended treatment course.   -  Gym routine recommendations    Written Home Exercises Provided: not today.      Assessment   Dong is a 33 y.o. male referred to outpatient Physical Therapy with a medical diagnosis of M53.3,G89.29 (ICD-10-CM) - Chronic left SI joint pain. Pt presents with > 1 year history of intermittent left SIJ pain. Mostly with dynamic walking and running activities. Normal lumbosacral imaging. Lumbar motor control and SIJ cluster testing suggestive of SIJ issue but patient does have history of low back pain with left sciatica attributed  to loading mechanism of injury.  Trial of TM running activated typical pain. Both lumbar motor control tests and TM running improved with increased activation of lumbar extensors for bracing. Therapy diagnosis: lumbar pain with movement coordination impairments.     Pt prognosis is Excellent.   Pt will benefit from skilled outpatient Physical Therapy to address the deficits stated above and in the chart below, provide pt/family education, and to maximize pt's level of independence.     Plan of care discussed with patient: Yes  Pt's spiritual, cultural and educational needs considered and patient is agreeable to the plan of care and goals as stated below:     Anticipated Barriers for therapy: none    Medical Necessity is demonstrated by the following  History  Co-morbidities and personal factors that may impact the plan of care Co-morbidities:   Kidney stones, DLD, back pain    Personal Factors:   no deficits     low   Examination  Body Structures and Functions, activity limitations and participation restrictions that may impact the plan of care Body Regions:   back    Body Systems:    strength  gait  motor control  edema    Participation Restrictions:   See above    Activity limitations:   Learning and applying knowledge  no deficits    General Tasks and Commands  no deficits    Communication  no deficits    Mobility  lifting and carrying objects  walking  running    Self care  no deficits    Domestic Life  no deficits    Interactions/Relationships  no deficits    Life Areas  no deficits    Community and Social Life  community life  recreation and leisure         low   Clinical Presentation stable and uncomplicated low   Decision Making/ Complexity Score: low     Goals:  Short Term Goals: 2-3 weeks:  1.  Patient will demonstrate understanding of and compliance with home exercise program.   2.  Patient will report ability to complete walking the dog without left SIJ area pain.   3.  Patient will demonstrate no pain  and normal lumbar motor control with seated knee extension and prone hip extension for pain-free transfers.    Long Term Goals: 8-12 weeks   1. Patient will demonstrate ability to complete 10' jog without increased lumbosacral pain.   2. Patient will report <3/10 left SIJ area pain at worst with heavy activities at home and gym.   3. Patient with report <2% limitation on FOTO survey.     Plan   Certification Period/Plan of care expiration: 8/16/2024 to 11/10/2024.    Outpatient Physical Therapy 14 times over the course of 12 weeks to include the following interventions: Cervical/Lumbar Traction, Gait Training, Manual Therapy, Moist Heat/ Ice, Neuromuscular Re-ed, Patient Education, Self Care, Therapeutic Activities, and Therapeutic Exercise, IASTM, FDN.     Jorje Rehman, PT, DPT, OCS

## 2024-08-19 ENCOUNTER — CLINICAL SUPPORT (OUTPATIENT)
Dept: REHABILITATION | Facility: HOSPITAL | Age: 34
End: 2024-08-19
Payer: COMMERCIAL

## 2024-08-19 DIAGNOSIS — M54.50 LUMBOSACRAL PAIN, CHRONIC: Primary | ICD-10-CM

## 2024-08-19 DIAGNOSIS — G89.29 LUMBOSACRAL PAIN, CHRONIC: Primary | ICD-10-CM

## 2024-08-19 PROCEDURE — 97116 GAIT TRAINING THERAPY: CPT | Mod: PN

## 2024-08-19 PROCEDURE — 97530 THERAPEUTIC ACTIVITIES: CPT | Mod: PN

## 2024-08-19 PROCEDURE — 97112 NEUROMUSCULAR REEDUCATION: CPT | Mod: PN

## 2024-08-19 NOTE — PROGRESS NOTES
[facility]   Physical Therapy Treatment Note      Name: Dong Dos Santosaux  Clinic Number: [id number]    Therapy Diagnosis:   Encounter Diagnosis   Name Primary?    Lumbosacral pain, chronic Yes     Physician: Ayla Donahue MD    Visit Date: 8/19/2024    Physician Orders: PT Eval and Treat   Medical Diagnosis: M53.3,G89.29 (ICD-10-CM) - Chronic left SI joint pain   Evaluation Date: 8/16/2024  Authorization Period Expiration: 12/31/2024  Plan of Care Certification Period: 8/16/2024 to 11/10/2024  Visit # / Visits authorized: 1/20 (+eval)  FOTO: 1/5  PTA visits: 0/5     Time In: 1500  Time Out: 1550  Total Billable Time: 50 minutes (2 NM, 1 GT, 1 TH)  Precautions: Standard      Subjective     Patient reports: for 1st f/u appt. Went to gym today; did legs. Consisting of .  He was compliant with home exercise program.  Response to previous treatment: eval  Functional change: none voiced    Pain: 0/10  Location: left SIJ at rest     Objective      Objective Measures updated at progress report unless specified.     Treatment     Dong received the treatments listed below:      Neuromuscular re-education activities to improve: Sense, Proprioception, and Posture for 25 minutes. The following activities were included:  Sidelying hip abd 2x10/each  Seated LAQs (motor control) 3x10/each  Quadruped shoulder FE 2x10/each  Quadruped hip extension 2x10/each    therapeutic activities to improve functional performance for 15  minutes, including:  Open books 2x10/each  Shoulder complex squats 4x5 high box  Seated hip hinge 2x10 5# wand behind head, high box    Gait training to improve functional mobility and safety for 10 minutes, including:  TM walk/jog interval -> 2' walk, 2' run (0 incline), 4' (2.0 incline), 2' walk        Patient Education and Home Exercises       Education provided:   - controlled loading program.     Written Home Exercises Provided: not today.     Assessment     Dong is a  33 y.o. male. Chronic left SI joint pain. Pt presents with > 1 year history of intermittent left SIJ pain. Mostly with dynamic walking and running activities. Normal lumbosacral imaging. Introduced lumbar extension-based lumbar motor control program with good tolerance.     Dong Is progressing well towards his goals.   Patient prognosis is Excellent.     Patient will continue to benefit from skilled outpatient physical therapy to address the deficits listed in the problem list box on initial evaluation, provide pt/family education and to maximize pt's level of independence in the home and community environment.   Patient's spiritual, cultural and educational needs considered and pt agreeable to plan of care and goals.     Anticipated barriers to physical therapy: none    Goals:   Short Term Goals: 2-3 weeks: --> progressing towards  1.  Patient will demonstrate understanding of and compliance with home exercise program.   2.  Patient will report ability to complete walking the dog without left SIJ area pain.   3.  Patient will demonstrate no pain and normal lumbar motor control with seated knee extension and prone hip extension for pain-free transfers.     Long Term Goals: 8-12 weeks --> --> progressing towards  1. Patient will demonstrate ability to complete 10' jog without increased lumbosacral pain.   2. Patient will report <3/10 left SIJ area pain at worst with heavy activities at home and gym.   3. Patient with report <2% limitation on FOTO survey.     Plan   Lumbar motor control program.     Jorje Rehman, PT, DPT, OCS

## 2024-08-26 ENCOUNTER — OFFICE VISIT (OUTPATIENT)
Dept: OTOLARYNGOLOGY | Facility: CLINIC | Age: 34
End: 2024-08-26
Payer: COMMERCIAL

## 2024-08-26 ENCOUNTER — CLINICAL SUPPORT (OUTPATIENT)
Dept: REHABILITATION | Facility: HOSPITAL | Age: 34
End: 2024-08-26
Payer: COMMERCIAL

## 2024-08-26 VITALS
SYSTOLIC BLOOD PRESSURE: 131 MMHG | DIASTOLIC BLOOD PRESSURE: 79 MMHG | WEIGHT: 169.88 LBS | HEART RATE: 65 BPM | BODY MASS INDEX: 25.08 KG/M2

## 2024-08-26 DIAGNOSIS — M54.50 LUMBOSACRAL PAIN, CHRONIC: Primary | ICD-10-CM

## 2024-08-26 DIAGNOSIS — J34.2 DEVIATED NASAL SEPTUM: Chronic | ICD-10-CM

## 2024-08-26 DIAGNOSIS — G89.29 LUMBOSACRAL PAIN, CHRONIC: Primary | ICD-10-CM

## 2024-08-26 DIAGNOSIS — J31.0 CHRONIC RHINITIS: Chronic | ICD-10-CM

## 2024-08-26 DIAGNOSIS — J34.89 NASAL SEPTAL PERFORATION: Primary | Chronic | ICD-10-CM

## 2024-08-26 PROCEDURE — 97112 NEUROMUSCULAR REEDUCATION: CPT | Mod: PN

## 2024-08-26 PROCEDURE — 99999 PR PBB SHADOW E&M-EST. PATIENT-LVL III: CPT | Mod: PBBFAC,,, | Performed by: OTOLARYNGOLOGY

## 2024-08-26 PROCEDURE — 99214 OFFICE O/P EST MOD 30 MIN: CPT | Mod: S$GLB,,, | Performed by: OTOLARYNGOLOGY

## 2024-08-26 PROCEDURE — 97116 GAIT TRAINING THERAPY: CPT | Mod: PN

## 2024-08-26 PROCEDURE — 97530 THERAPEUTIC ACTIVITIES: CPT | Mod: PN

## 2024-08-26 RX ORDER — MUPIROCIN 20 MG/G
OINTMENT TOPICAL 2 TIMES DAILY
Qty: 15 G | Refills: 3 | Status: SHIPPED | OUTPATIENT
Start: 2024-08-26 | End: 2024-09-05

## 2024-08-26 NOTE — PROGRESS NOTES
Chief Complaint   Patient presents with    Follow-up     Stated that things have improved 75% also informed us that he is taking medications as prescribed         HPI 4/2024:   The pt is a 33 y.o. male with nasal congestion of many years duration. The congestion is reported to be moderate. Associated signs and symptoms are clear runny nose, post nasal drip, and sniffling. The patient ( patient representative) denies purulent runny nose, facial pain, headache, and cough.    There is no history of snoring. There is no of sleep disturbance.    The patient has exercised induced asthma years ago, but not current symtpoms. .  The patient does not have eczema.  The patient has been diagnosed with allergic rhinitis.     The patient has been treated with: OTC antihistamines, prescription antihistamines, intranasal steroids, and montelukast.  He has not tried intranasal antihistamines.  He is currently taking Allegra.    The response to the above noted treatment is described as: minimal improvement. The patient's evaluation to date includes: allergy testing.  Allergy testing years ago showed sensitivity to grasses, this was done in 2005.  Patient had a septoplasty and turbinate reduction done many years ago, and in the more recent past had an additional turbinate reduction done by Dr. Milena Mancia a few years ago.  Dr. Mancia had mentioned at that time that his septum was still deviated and that he had a small septal perforation that may additionally be causing some of his symptoms.        Interval HPI 08/26/2024 :      Reports his symptoms are much improved.  He has been using the Dymista daily as well as the Xyzal.  He still has times that the right nostril feels congested and more closed up and he does get occasional mucus and crusting, but this occurs far less frequently than previous.  He does use saline nasal rinses occasionally.              Past Medical History:   Diagnosis Date    Androgenetic alopecia     Chronic  rhinosinusitis     Dyslipidemia 2022    Kidney stones     Low testosterone      Social History     Socioeconomic History    Marital status:    Occupational History    Occupation:    Tobacco Use    Smoking status: Never    Smokeless tobacco: Never   Substance and Sexual Activity    Alcohol use: Yes     Alcohol/week: 2.0 standard drinks of alcohol     Types: 2 Standard drinks or equivalent per week     Comment: socially    Drug use: No    Sexual activity: Yes     Partners: Female     Social Determinants of Health     Financial Resource Strain: Low Risk  (7/24/2024)    Overall Financial Resource Strain (CARDIA)     Difficulty of Paying Living Expenses: Not hard at all   Food Insecurity: No Food Insecurity (7/24/2024)    Hunger Vital Sign     Worried About Running Out of Food in the Last Year: Never true     Ran Out of Food in the Last Year: Never true   Physical Activity: Insufficiently Active (7/24/2024)    Exercise Vital Sign     Days of Exercise per Week: 4 days     Minutes of Exercise per Session: 30 min   Stress: Stress Concern Present (7/24/2024)    [employer] - Occupational Stress Questionnaire     Feeling of Stress : To some extent   Housing Stability: Unknown (7/24/2024)    Housing Stability Vital Sign     Unable to Pay for Housing in the Last Year: No     Past Surgical History:   Procedure Laterality Date    TOE SURGERY Left 2015    TONSILLECTOMY      TURBINATE RESECTION Bilateral 2010, 2006    done twice    TYMPANOSTOMY TUBE PLACEMENT Bilateral 1990     Family History   Problem Relation Name Age of Onset    Celiac disease Mother      Diabetes Father      No Known Problems Sister      No Known Problems Maternal Grandmother      No Known Problems Maternal Grandfather      No Known Problems Paternal Grandmother      No Known Problems Paternal Grandfather             Review of Systems  General: negative for chills, fever or weight loss  Psychological:  negative for mood changes or depression  Ophthalmic: negative for blurry vision, photophobia or eye pain  ENT: see HPI  Respiratory: no cough, shortness of breath, or wheezing  Cardiovascular: no chest pain or dyspnea on exertion  Gastrointestinal: no abdominal pain, change in bowel habits, or black/ bloody stools  Musculoskeletal: negative for gait disturbance or muscular weakness  Neurological: no syncope or seizures; no ataxia  Dermatological: negative for pruritis,  rash and jaundice  Hematologic/lymphatic: no easy bruising, no new adenopathy      Physical Exam:    Vitals:    08/26/24 0909   BP: 131/79   Pulse: 65           Constitutional:   He is oriented to person, place, and time. Vital signs are normal. He appears well-developed and well-nourished. He appears alert. He is cooperative. Normal speech.      Head:  Normocephalic and atraumatic. Salivary glands normal.  Facial strength is normal.      Ears:  Hearing normal to normal and whispered voice; external ear normal without scars, lesions, or masses; ear canal, tympanic membrane, and middle ear normal., right ear hearing normal to normal and whispered voice; external ear normal without scars, lesions, or masses; ear canal, tympanic membrane, and middle ear normal. and left ear hearing normal to normal and whispered voice; external ear normal without scars, lesions, or masses; ear canal, tympanic membrane, and middle ear normal..   Right Ear: Tympanic membrane is not erythematous and not retracted. No middle ear effusion.   Left Ear: Tympanic membrane is not erythematous and not retracted.  No middle ear effusion.     Nose:  Mucosal edema and septal deviation (Septum still persistently deviated to right; septal perforation noted and scant crusting noted) present. No rhinorrhea or polyps. Turbinates abnormal.  No turbinate hypertrophy (Previous turbinate reduction/resection; small residual turbinate on left, moderate size residual turbinate on right).  Right  sinus exhibits no maxillary sinus tenderness and no frontal sinus tenderness. Left sinus exhibits no maxillary sinus tenderness and no frontal sinus tenderness.     Mouth/Throat  Oropharynx clear and moist without lesions or asymmetry, normal uvula midline, lips, teeth, and gums normal and oropharynx normal. No uvula swelling, oral lesions, trismus or mucous membrane lesions. No oropharyngeal exudate, posterior oropharyngeal edema or posterior oropharyngeal erythema. Tonsils not present.  Mirror exam not performed due to patient tolerance.  Mirror exam not performed due to patient tolerance.      Neck:  Neck normal without thyromegaly masses, asymmetry, normal tracheal structure, crepitus, and tenderness, thyroid normal, trachea normal, phonation normal, full range of motion with neck supple and no adenopathy. No JVD present. Carotid bruit is not present. No thyroid mass and no thyromegaly present.     He has no cervical adenopathy.     Cardiovascular:    Normal rate, regular rhythm and rate and rhythm, heart sounds, and pulses normal.              Pulmonary/Chest:   Effort and breath sounds normal.     Psychiatric:   He has a normal mood and affect. His speech is normal and behavior is normal.     Neurological:   He is alert and oriented to person, place, and time. He has neurological normal, alert and oriented. No cranial nerve deficit.     Skin:   No abrasions, lacerations, lesions, or rashes.           Assessment:    ICD-10-CM ICD-9-CM    1. Nasal septal perforation  J34.89 478.19       2. Chronic rhinitis  J31.0 472.0       3. Deviated nasal septum  J34.2 470           The primary encounter diagnosis was Nasal septal perforation. Diagnoses of Chronic rhinitis and Deviated nasal septum were also pertinent to this visit.      Plan:    Continue xyzal and dymista daily.   Add [facility] nasal symptom management nasal rinses.    Mupirocin ointment to be used for nasal crusting and irritation  p.r.n..    Follow-up in 6 months or sooner if needed.      Salome Mckenna MD

## 2024-08-26 NOTE — PATIENT INSTRUCTIONS
Continue dymista and xyzal daily.   Add mupirocin to nose for crusting/irritation.     Medication for the nasal rinse will be sent from the compounding pharmacy, [facility], in [place], [place].  Use it according to directions.     He will let me know if symptoms are changing, but we will follow up in 6 mos for routine visit if symptoms are stable.

## 2024-08-26 NOTE — PROGRESS NOTES
[facility]   Physical Therapy Treatment Note      Name: Dong Dos Santosaux  Clinic Number: [id number]    Therapy Diagnosis:   Encounter Diagnosis   Name Primary?    Lumbosacral pain, chronic Yes     Physician: Ayla Donahue MD    Visit Date: 8/26/2024    Physician Orders: PT Eval and Treat   Medical Diagnosis: M53.3,G89.29 (ICD-10-CM) - Chronic left SI joint pain   Evaluation Date: 8/16/2024  Authorization Period Expiration: 12/31/2024  Plan of Care Certification Period: 8/16/2024 to 11/10/2024  Visit # / Visits authorized: 2/20 (+eval)  FOTO: 3/5  PTA visits: 0/5     Time In: 1500  Time Out: 1550  Total Billable Time: 50 minutes (2 NM, 1 GT, 1 TH)  Precautions: Standard      Subjective     Patient reports: for 2nd f/u appt. Plans to run in [place] on 09/7/2024  He was compliant with home exercise program.  Response to previous treatment: no adverse effects  Functional change: none voiced    Pain: 0/10  Location: left SIJ at rest     Objective      Objective Measures updated at progress report unless specified.     Treatment     Dong received the treatments listed below:      Neuromuscular re-education activities to improve: Sense, Proprioception, and Posture for 25 minutes. The following activities were included:  Sidelying hip abd 2x10/each  Seated LAQs (motor control) 3x10/each  Quadruped shoulder FE 2x10/each  Quadruped hip extension 2x10/each  Quadruped alternating shoulder FE/hip ext 2x10/each    therapeutic activities to improve functional performance for 15 minutes, including:  Open books 2x10/each  Shoulder complex squats 3x10 high box 5#  Seated hip hinge 3x10 5# wand behind head, high box    Gait training to improve functional mobility and safety for 10 minutes, including:  TM walk/jog interval -> 3' walk, 4' run (0 incline), 4' (2.0 incline), 4' walk      Patient Education and Home Exercises       Education provided:   - controlled loading program.     Written  Home Exercises Provided: not today.     Assessment     Dong is a 33 y.o. male. Chronic left SI joint pain. Pt presents with > 1 year history of intermittent left SIJ pain. Mostly with dynamic walking and running activities. Normal lumbosacral imaging. Continued lumbar extension-based lumbar motor control program with good tolerance.     Dong Is progressing well towards his goals.   Patient prognosis is Excellent.     Patient will continue to benefit from skilled outpatient physical therapy to address the deficits listed in the problem list box on initial evaluation, provide pt/family education and to maximize pt's level of independence in the home and community environment.   Patient's spiritual, cultural and educational needs considered and pt agreeable to plan of care and goals.     Anticipated barriers to physical therapy: none    Goals:   Short Term Goals: 2-3 weeks: --> progressing towards  1.  Patient will demonstrate understanding of and compliance with home exercise program.   2.  Patient will report ability to complete walking the dog without left SIJ area pain.   3.  Patient will demonstrate no pain and normal lumbar motor control with seated knee extension and prone hip extension for pain-free transfers.     Long Term Goals: 8-12 weeks --> --> progressing towards  1. Patient will demonstrate ability to complete 10' jog without increased lumbosacral pain.   2. Patient will report <3/10 left SIJ area pain at worst with heavy activities at home and gym.   3. Patient with report <2% limitation on FOTO survey.     Plan   Lumbar motor control program.     Jorje Rehman, PT, DPT, OCS

## 2024-09-06 ENCOUNTER — CLINICAL SUPPORT (OUTPATIENT)
Dept: REHABILITATION | Facility: HOSPITAL | Age: 34
End: 2024-09-06
Payer: COMMERCIAL

## 2024-09-06 DIAGNOSIS — G89.29 LUMBOSACRAL PAIN, CHRONIC: Primary | ICD-10-CM

## 2024-09-06 DIAGNOSIS — M54.50 LUMBOSACRAL PAIN, CHRONIC: Primary | ICD-10-CM

## 2024-09-06 PROCEDURE — 97116 GAIT TRAINING THERAPY: CPT | Mod: PN

## 2024-09-06 PROCEDURE — 97112 NEUROMUSCULAR REEDUCATION: CPT | Mod: PN

## 2024-09-06 PROCEDURE — 97530 THERAPEUTIC ACTIVITIES: CPT | Mod: PN

## 2024-09-06 NOTE — PROGRESS NOTES
[facility]   Physical Therapy Treatment Note      Name: oDng Menendez Eduin  Clinic Number: [id number]    Therapy Diagnosis:   Encounter Diagnosis   Name Primary?    Lumbosacral pain, chronic Yes     Physician: Ayla Donahue MD    Visit Date: 9/6/2024    Physician Orders: PT Eval and Treat   Medical Diagnosis: M53.3,G89.29 (ICD-10-CM) - Chronic left SI joint pain   Evaluation Date: 8/16/2024  Authorization Period Expiration: 12/31/2024  Plan of Care Certification Period: 8/16/2024 to 11/10/2024  Visit # / Visits authorized: 3/20 (+eval)  FOTO: 4/5  PTA visits: 0/5     Time In: 1000  Time Out: 1055  Total Billable Time: 55 minutes (2 NM, 1 GT, 1 TH)  Precautions: Standard    Subjective     Patient reports: for 3rd f/u appt. Plans to run in [place] on 09/7/2024. Long car ride over the weekend cause some incr'd left SIJ area pain.   He was compliant with home exercise program.  Response to previous treatment: no adverse effects  Functional change: none voiced    Pain: 0/10  Location: left SIJ at rest     Objective      Objective Measures updated at progress report unless specified.     Treatment     Dong received the treatments listed below:      Neuromuscular re-education activities to improve: Sense, Proprioception, and Posture for 25 minutes. The following activities were included:  Sidelying hip abd 2x15/each  Seated LAQs (motor control) 3x10/each  Quadruped shoulder FE 3x10/each  Quadruped hip extension 2x10/each  Quadruped alternating shoulder FE/hip ext 2x10/each  (+) prone press-ups 2x10    therapeutic activities to improve functional performance for 10 minutes, including:  Open books 2x10/each  Shoulder complex squats 3x10 medium boxbox 5#  Seated hip hinge 3x10 5# wand behind head, high box    Gait training to improve functional mobility and safety for 20 minutes, including:  TM walk/jog interval -> 3' walk, 5' run (0 incline), 5' (2.0 incline), 4' walk      Patient  Education and Home Exercises       Education provided:   - controlled loading program.     Written Home Exercises Provided: not today.     Assessment     Dong is a 33 y.o. male. Chronic left SI joint pain. Pt presents with > 1 year history of intermittent left SIJ pain. Mostly with dynamic walking and running activities. Normal lumbosacral imaging. Continued lumbar extension-based lumbar motor control program with good tolerance. Addition of prone press-ups with good tolerance. Set to run 5K tomorrow.      Dong Is progressing well towards his goals.   Patient prognosis is Excellent.     Patient will continue to benefit from skilled outpatient physical therapy to address the deficits listed in the problem list box on initial evaluation, provide pt/family education and to maximize pt's level of independence in the home and community environment.   Patient's spiritual, cultural and educational needs considered and pt agreeable to plan of care and goals.     Anticipated barriers to physical therapy: none    Goals:   Short Term Goals: 2-3 weeks: --> progressing towards  1.  Patient will demonstrate understanding of and compliance with home exercise program.   2.  Patient will report ability to complete walking the dog without left SIJ area pain.   3.  Patient will demonstrate no pain and normal lumbar motor control with seated knee extension and prone hip extension for pain-free transfers.     Long Term Goals: 8-12 weeks --> --> progressing towards  1. Patient will demonstrate ability to complete 10' jog without increased lumbosacral pain.   2. Patient will report <3/10 left SIJ area pain at worst with heavy activities at home and gym.   3. Patient with report <2% limitation on FOTO survey.     Plan   Lumbar motor control program.     Jorje Rehman, PT, DPT, OCS

## 2024-09-13 ENCOUNTER — CLINICAL SUPPORT (OUTPATIENT)
Dept: REHABILITATION | Facility: HOSPITAL | Age: 34
End: 2024-09-13
Payer: COMMERCIAL

## 2024-09-13 DIAGNOSIS — M54.50 LUMBOSACRAL PAIN, CHRONIC: Primary | ICD-10-CM

## 2024-09-13 DIAGNOSIS — G89.29 LUMBOSACRAL PAIN, CHRONIC: Primary | ICD-10-CM

## 2024-09-13 PROCEDURE — 97112 NEUROMUSCULAR REEDUCATION: CPT | Mod: PN,CQ

## 2024-09-13 PROCEDURE — 97530 THERAPEUTIC ACTIVITIES: CPT | Mod: PN,CQ

## 2024-09-13 NOTE — PROGRESS NOTES
[facility]   Physical Therapy Treatment Note      Name: Dong Dos Santosaux  Clinic Number: [id number]    Therapy Diagnosis:   Encounter Diagnosis   Name Primary?    Lumbosacral pain, chronic Yes     Physician: Ayla Donahue MD    Visit Date: 9/13/2024    Physician Orders: PT Eval and Treat   Medical Diagnosis: M53.3,G89.29 (ICD-10-CM) - Chronic left SI joint pain   Evaluation Date: 8/16/2024  Authorization Period Expiration: 12/31/2024  Plan of Care Certification Period: 8/16/2024 to 11/10/2024  Visit # / Visits authorized: 4/20 (+eval)  FOTO: 5/5  PTA visits: 1/5     Time In: 1100  Time Out: 1150  Total Billable Time: 50 minutes (3 NM, 1 TH)  Precautions: Standard    Subjective     Patient reports: Ran 5k, Reports 7/10 pain throughout race. Moderate soreness for about 3 days after race.   He was compliant with home exercise program.  Response to previous treatment: no adverse effects  Functional change: none voiced    Pain: 0/10  Location: left SIJ at rest     Objective      Objective Measures updated at progress report unless specified.     Treatment     Dong received the treatments listed below:      Neuromuscular re-education activities to improve: Sense, Proprioception, and Posture for 40 minutes. The following activities were included:  Sidelying hip abd 2x15/each  Seated LAQs (motor control) 3x10/each  Quadruped shoulder FE 3x10/each  Quadruped hip extension 2x10/each  Quadruped alternating shoulder FE/hip ext 2x10/each  Prone press-ups 2x10    therapeutic activities to improve functional performance for 10 minutes, including:  Open books 2x10/each  Shoulder complex squats 3x10 medium boxbox 5#  Seated hip hinge 3x10 5# wand behind head, high box    Gait training to improve functional mobility and safety for 00 minutes, including:  TM walk/jog interval -> 3' walk, 5' run (0 incline), 5' (2.0 incline), 4' walk OOT    Manual Therapy x 5 min: LAD to LLE      Patient  "Education and Home Exercises       Education provided:   - controlled loading program.     Written Home Exercises Provided: not today.     Assessment     Dong is a 33 y.o. male. Chronic left SI joint pain. Pt presents with > 1 year history of intermittent left SIJ pain. Mostly with dynamic walking and running activities. Normal lumbosacral imaging. Fair tolerance to 5k completed last week. Moderate to high soreness after. Feeling "back to normal" today. Continued lumbar extension-based lumbar motor control program with good tolerance. Trial of LAD with no significant change in symptoms. Will monitor response.     Dong Is progressing well towards his goals.   Patient prognosis is Excellent.     Patient will continue to benefit from skilled outpatient physical therapy to address the deficits listed in the problem list box on initial evaluation, provide pt/family education and to maximize pt's level of independence in the home and community environment.   Patient's spiritual, cultural and educational needs considered and pt agreeable to plan of care and goals.     Anticipated barriers to physical therapy: none    Goals:   Short Term Goals: 2-3 weeks: --> progressing towards  1.  Patient will demonstrate understanding of and compliance with home exercise program.   2.  Patient will report ability to complete walking the dog without left SIJ area pain.   3.  Patient will demonstrate no pain and normal lumbar motor control with seated knee extension and prone hip extension for pain-free transfers.     Long Term Goals: 8-12 weeks --> --> progressing towards  1. Patient will demonstrate ability to complete 10' jog without increased lumbosacral pain.   2. Patient will report <3/10 left SIJ area pain at worst with heavy activities at home and gym.   3. Patient with report <2% limitation on FOTO survey.     Plan   Lumbar motor control program.     Allison Brush, PTA        "

## 2024-09-20 ENCOUNTER — CLINICAL SUPPORT (OUTPATIENT)
Dept: REHABILITATION | Facility: HOSPITAL | Age: 34
End: 2024-09-20
Payer: COMMERCIAL

## 2024-09-20 DIAGNOSIS — G89.29 LUMBOSACRAL PAIN, CHRONIC: Primary | ICD-10-CM

## 2024-09-20 DIAGNOSIS — M54.50 LUMBOSACRAL PAIN, CHRONIC: Primary | ICD-10-CM

## 2024-09-20 PROCEDURE — 97530 THERAPEUTIC ACTIVITIES: CPT | Mod: PN

## 2024-09-20 PROCEDURE — 97012 MECHANICAL TRACTION THERAPY: CPT | Mod: KX,PN

## 2024-09-20 PROCEDURE — 97140 MANUAL THERAPY 1/> REGIONS: CPT | Mod: PN

## 2024-09-20 PROCEDURE — 97112 NEUROMUSCULAR REEDUCATION: CPT | Mod: PN

## 2024-09-20 NOTE — PROGRESS NOTES
[facility]   Physical Therapy Treatment Note      Name: Dong Dos Santosaux  Clinic Number: [id number]    Therapy Diagnosis:   Encounter Diagnosis   Name Primary?    Lumbosacral pain, chronic Yes     Physician: Ayla Donahue MD    Visit Date: 9/20/2024    Physician Orders: PT Eval and Treat   Medical Diagnosis: M53.3,G89.29 (ICD-10-CM) - Chronic left SI joint pain   Evaluation Date: 8/16/2024  Authorization Period Expiration: 12/31/2024  Plan of Care Certification Period: 8/16/2024 to 11/10/2024  Visit # / Visits authorized: 5/20 (+eval)  FOTO: 6/8  PTA visits: 0/5     Time In: 1100  Time Out: 1155  Total Billable Time: 55 minutes (1 NM,  TH, 1 MT, 1 Mechanical traction)  Precautions: Standard    Subjective     Patient reports: no new changes.   He was compliant with home exercise program.  Response to previous treatment: no adverse effects  Functional change: none voiced    Pain: 0/10, 7/10  Location: left SIJ at rest, walking fast or running/joggin    Objective      (-) Elaine's sign bilateral  (+) sacral springing  No pain with lumbar posterior to anterior mobs    Treatment     Dong received the treatments listed below:      Neuromuscular re-education activities to improve: Sense, Proprioception, and Posture for 15 minutes. The following activities were included:  Sidelying hip abd 2x15/each  Seated LAQs (motor control) 3x10/each  Prone press-ups 2x10    Therapeutic activities to improve functional performance for 10 minutes, including:  Bridges 4x10    Manual Therapy x 15 min:   Assessment of lumbosacral spine  LLE LAD 5 rounds (++ response)    Mechanical traction: lumbar: total time 15 minutes:  55# pull on: 20# off, 30 seconds on: 15 off      Patient Education and Home Exercises       Education provided:   - controlled loading program.     Written Home Exercises Provided: not today.     Assessment     Dong is a 33 y.o. male. Chronic left SI joint pain. Pt presents with >  1 year history of intermittent left SIJ pain. Mostly with dynamic walking and running activities. Positive response to lumbar traction and LAD LLE today.     Dong Is progressing well towards his goals.   Patient prognosis is Excellent.     Patient will continue to benefit from skilled outpatient physical therapy to address the deficits listed in the problem list box on initial evaluation, provide pt/family education and to maximize pt's level of independence in the home and community environment.   Patient's spiritual, cultural and educational needs considered and pt agreeable to plan of care and goals.     Anticipated barriers to physical therapy: none    Goals:   Short Term Goals: 2-3 weeks: --> progressing towards  1.  Patient will demonstrate understanding of and compliance with home exercise program.   2.  Patient will report ability to complete walking the dog without left SIJ area pain.   3.  Patient will demonstrate no pain and normal lumbar motor control with seated knee extension and prone hip extension for pain-free transfers.     Long Term Goals: 8-12 weeks --> --> progressing towards  1. Patient will demonstrate ability to complete 10' jog without increased lumbosacral pain.   2. Patient will report <3/10 left SIJ area pain at worst with heavy activities at home and gym.   3. Patient with report <2% limitation on FOTO survey.     Plan   Lumbar motor control program.   Lumbar traction    Jorje Rehman, PT, DPT, OCS

## 2024-09-23 ENCOUNTER — CLINICAL SUPPORT (OUTPATIENT)
Dept: REHABILITATION | Facility: HOSPITAL | Age: 34
End: 2024-09-23
Payer: COMMERCIAL

## 2024-09-23 DIAGNOSIS — G89.29 LUMBOSACRAL PAIN, CHRONIC: Primary | ICD-10-CM

## 2024-09-23 DIAGNOSIS — M54.50 LUMBOSACRAL PAIN, CHRONIC: Primary | ICD-10-CM

## 2024-09-23 PROCEDURE — 97530 THERAPEUTIC ACTIVITIES: CPT | Mod: PN,CQ

## 2024-09-23 PROCEDURE — 97140 MANUAL THERAPY 1/> REGIONS: CPT | Mod: PN,CQ

## 2024-09-23 PROCEDURE — 97012 MECHANICAL TRACTION THERAPY: CPT | Mod: PN,CQ

## 2024-09-23 PROCEDURE — 97112 NEUROMUSCULAR REEDUCATION: CPT | Mod: PN,CQ

## 2024-09-23 NOTE — PROGRESS NOTES
[facility]   Physical Therapy Treatment Note      Name: Dong Dos Santosaux  Clinic Number: [id number]    Therapy Diagnosis:   Encounter Diagnosis   Name Primary?    Lumbosacral pain, chronic Yes     Physician: Ayla Donahue MD    Visit Date: 9/23/2024    Physician Orders: PT Eval and Treat   Medical Diagnosis: M53.3,G89.29 (ICD-10-CM) - Chronic left SI joint pain   Evaluation Date: 8/16/2024  Authorization Period Expiration: 12/31/2024  Plan of Care Certification Period: 8/16/2024 to 11/10/2024  Visit # / Visits authorized: 6/20 (+eval)  FOTO: 7/8  PTA visits: 1/5     Time In: 1500  Time Out: 1600  Total Billable Time: 60 minutes (2 NM,  TH, 1 MT, 1 Mechanical traction)  Precautions: Standard    Subjective     Patient reports: Good response to traction, temporarily relieved symptoms  He was compliant with home exercise program.  Response to previous treatment: no adverse effects  Functional change: none voiced    Pain: 0/10, 7/10  Location: left SIJ at rest, walking fast or running/joggin    Objective      (-) Elaine's sign bilateral  (+) sacral springing  No pain with lumbar posterior to anterior mobs    Treatment     Dong received the treatments listed below:      Neuromuscular re-education activities to improve: Sense, Proprioception, and Posture for 25 minutes. The following activities were included:  Sidelying hip abd 2x15/each  Seated LAQs (motor control) 3x10/each  Prone press-ups 2x10    Therapeutic activities to improve functional performance for 10 minutes, including:  Bridges 4x10    Manual Therapy x 10 min:   LLE LAD 5 rounds (++ response)    Mechanical traction: lumbar: total time 15 minutes:  55# pull on: 20# off, 30 seconds on: 15 off      Patient Education and Home Exercises       Education provided:   - controlled loading program.     Written Home Exercises Provided: not today.     Assessment     Dong is a 33 y.o. male. Chronic left SI joint pain. Pt presents  with > 1 year history of intermittent left SIJ pain. Mostly with dynamic walking and running activities. Continues to respond well to lumbar traction and LAD LLE today. Will monitor response.     Dong Is progressing well towards his goals.   Patient prognosis is Excellent.     Patient will continue to benefit from skilled outpatient physical therapy to address the deficits listed in the problem list box on initial evaluation, provide pt/family education and to maximize pt's level of independence in the home and community environment.   Patient's spiritual, cultural and educational needs considered and pt agreeable to plan of care and goals.     Anticipated barriers to physical therapy: none    Goals:   Short Term Goals: 2-3 weeks: --> progressing towards  1.  Patient will demonstrate understanding of and compliance with home exercise program.   2.  Patient will report ability to complete walking the dog without left SIJ area pain.   3.  Patient will demonstrate no pain and normal lumbar motor control with seated knee extension and prone hip extension for pain-free transfers.     Long Term Goals: 8-12 weeks --> --> progressing towards  1. Patient will demonstrate ability to complete 10' jog without increased lumbosacral pain.   2. Patient will report <3/10 left SIJ area pain at worst with heavy activities at home and gym.   3. Patient with report <2% limitation on FOTO survey.     Plan   Lumbar motor control program.   Lumbar traction    Allison Brush, PTA

## 2024-09-27 ENCOUNTER — CLINICAL SUPPORT (OUTPATIENT)
Dept: REHABILITATION | Facility: HOSPITAL | Age: 34
End: 2024-09-27
Payer: COMMERCIAL

## 2024-09-27 DIAGNOSIS — M54.50 LUMBOSACRAL PAIN, CHRONIC: Primary | ICD-10-CM

## 2024-09-27 DIAGNOSIS — G89.29 LUMBOSACRAL PAIN, CHRONIC: Primary | ICD-10-CM

## 2024-09-27 PROCEDURE — 97112 NEUROMUSCULAR REEDUCATION: CPT | Mod: PN

## 2024-09-27 PROCEDURE — 97530 THERAPEUTIC ACTIVITIES: CPT | Mod: PN

## 2024-09-27 NOTE — PROGRESS NOTES
[facility]   Physical Therapy Treatment Note      Name: Dong Dos Santosaux  Clinic Number: [id number]    Therapy Diagnosis:   Encounter Diagnosis   Name Primary?    Lumbosacral pain, chronic Yes     Physician: Ayla Donahue MD    Visit Date: 9/27/2024    Physician Orders: PT Eval and Treat   Medical Diagnosis: M53.3,G89.29 (ICD-10-CM) - Chronic left SI joint pain   Evaluation Date: 8/16/2024  Authorization Period Expiration: 12/31/2024  Plan of Care Certification Period: 8/16/2024 to 11/10/2024  Visit # / Visits authorized: 7/20 (+eval)  FOTO: 7/8  PTA visits: 1/5     Time In11:05  Time Out: 11:45  Total Billable Time: 40 minutes (2 NM,  TH)  Precautions: Standard    Subjective     Patient reports: soreness with traction for about 24 hours. Holding off today (due to volleyball game tonight), but overall feels it helps. Was able to run for about 100 yards yesterday with no pain after. Continues with pain along L SI joint.   He was compliant with home exercise program.  Response to previous treatment: no adverse effects  Functional change: none voiced    Pain:7/10  Location: left SIJ at rest, walking fast or running/joggin    Objective      9/27/24:  (+) L Elaine's sign bilateral      (+) sacral springing  No pain with lumbar posterior to anterior mobs    Treatment     Dong received the treatments listed below:      Neuromuscular re-education activities to improve: Sense, Proprioception, and Posture for 23 minutes. The following activities were included:  Sidelying hip abd 2x15/each  Seated LAQs (motor control) 4x10/each  Prone press-ups 2x10  +Bird dogs 2x5 bilaterally   +Knee drivers: 18'' step 2x5 reps (focus: maintaining knee tracking forward, avoid genu valgus- mirror feedback)    Therapeutic activities to improve functional performance for 17 minutes, including:  Bridges 4x10 with blue band hip abd  +Bilateral hip abd, flex, ext using foot sliders 2x10 (focus: frontal hip  stability, forward knee tracking- mirror feedback)    Manual Therapy 00min:   LLE LAD 5 rounds (++ response)    Mechanical traction: lumbar: total time 00 minutes:  55# pull on: 20# off, 30 seconds on: 15 off      Patient Education and Home Exercises       Education provided:   - controlled loading program.     Written Home Exercises Provided: not today.     Assessment     Dong is a 33 y.o. male. Chronic left SI joint pain. Pt presents with > 1 year history of intermittent left SIJ pain. Mostly with dynamic walking and running activities.  Interval: Patient presents with high pain, particularly bothersome while lying supine. Positive L fortins sign today. Addressed knee/hip stability and motor control with dynamic movements such as hip abduction, flexion, extension and step-ups. Patient had trouble avoiding dynamic genu valgus and/or trendelenburg sign, particularly with fatigue onset. However, he was able to correct knee/hip alignment with verbal and mirror feedback. Decided to forego lumbar traction today due to soreness thereafter; but does endorse positive response.     Dong Is progressing well towards his goals.   Patient prognosis is Excellent.     Patient will continue to benefit from skilled outpatient physical therapy to address the deficits listed in the problem list box on initial evaluation, provide pt/family education and to maximize pt's level of independence in the home and community environment.   Patient's spiritual, cultural and educational needs considered and pt agreeable to plan of care and goals.     Anticipated barriers to physical therapy: none    Goals:   Short Term Goals: 2-3 weeks: --> progressing towards  1.  Patient will demonstrate understanding of and compliance with home exercise program.   2.  Patient will report ability to complete walking the dog without left SIJ area pain.   3.  Patient will demonstrate no pain and normal lumbar motor control with seated knee extension and prone  hip extension for pain-free transfers.     Long Term Goals: 8-12 weeks --> --> progressing towards  1. Patient will demonstrate ability to complete 10' jog without increased lumbosacral pain.   2. Patient will report <3/10 left SIJ area pain at worst with heavy activities at home and gym.   3. Patient with report <2% limitation on FOTO survey.     Plan   Lumbar motor control program.   Lumbar traction    Tete Leija, PT

## 2024-10-07 ENCOUNTER — CLINICAL SUPPORT (OUTPATIENT)
Dept: REHABILITATION | Facility: HOSPITAL | Age: 34
End: 2024-10-07
Payer: COMMERCIAL

## 2024-10-07 ENCOUNTER — OFFICE VISIT (OUTPATIENT)
Dept: UROLOGY | Facility: CLINIC | Age: 34
End: 2024-10-07
Payer: COMMERCIAL

## 2024-10-07 VITALS
BODY MASS INDEX: 25.14 KG/M2 | DIASTOLIC BLOOD PRESSURE: 90 MMHG | WEIGHT: 169.75 LBS | SYSTOLIC BLOOD PRESSURE: 142 MMHG | HEART RATE: 78 BPM | HEIGHT: 69 IN

## 2024-10-07 DIAGNOSIS — G89.29 LUMBOSACRAL PAIN, CHRONIC: Primary | ICD-10-CM

## 2024-10-07 DIAGNOSIS — E29.1 TESTICULAR FAILURE: Primary | ICD-10-CM

## 2024-10-07 DIAGNOSIS — M54.50 LUMBOSACRAL PAIN, CHRONIC: Primary | ICD-10-CM

## 2024-10-07 PROBLEM — J31.0 CHRONIC RHINITIS: Status: RESOLVED | Noted: 2019-08-26 | Resolved: 2024-10-07

## 2024-10-07 PROBLEM — R74.01 ELEVATED ALT MEASUREMENT: Status: RESOLVED | Noted: 2023-09-25 | Resolved: 2024-10-07

## 2024-10-07 PROBLEM — M20.40 HAMMER TOE: Status: RESOLVED | Noted: 2017-08-24 | Resolved: 2024-10-07

## 2024-10-07 PROBLEM — E34.9 TESTOSTERONE DEFICIENCY: Status: RESOLVED | Noted: 2023-09-25 | Resolved: 2024-10-07

## 2024-10-07 PROBLEM — D58.2 ELEVATED HEMOGLOBIN: Status: RESOLVED | Noted: 2023-09-25 | Resolved: 2024-10-07

## 2024-10-07 PROBLEM — N52.9 ERECTILE DYSFUNCTION: Status: RESOLVED | Noted: 2024-05-03 | Resolved: 2024-10-07

## 2024-10-07 PROCEDURE — 99999 PR PBB SHADOW E&M-EST. PATIENT-LVL III: CPT | Mod: PBBFAC,,, | Performed by: UROLOGY

## 2024-10-07 PROCEDURE — 97012 MECHANICAL TRACTION THERAPY: CPT | Mod: PN,CQ

## 2024-10-07 PROCEDURE — 97112 NEUROMUSCULAR REEDUCATION: CPT | Mod: PN,CQ

## 2024-10-07 PROCEDURE — 97140 MANUAL THERAPY 1/> REGIONS: CPT | Mod: PN,CQ

## 2024-10-07 PROCEDURE — 97530 THERAPEUTIC ACTIVITIES: CPT | Mod: PN,CQ

## 2024-10-07 NOTE — PROGRESS NOTES
"Chief Complaint:  Infertility    HPI:    Mr. Denny is a 34 y.o.  male who has been  to his wife for the past 1 years. They have been trying to achieve a pregnancy for the past 10 months but without success. 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 He denies a history of erectile dysfunction and ejaculatory problems.    He's been on TRT in the past.  Managed by a "men's clinic."  Last injected .  Currently on clomid 50 mg taking QOD.  Also taking HCG and arimidex.  Also managed by a men's clinic.    He has achieved 1 pregnancies in the past with a different partner that was terminated electively.    SA 24--1.0 cc/169 million per cc/81%/6%    Alejandra Denny is 36 years old. ( [birth year]) Her menses are regular. She has not undergone prior hysterosalpingogram. She has achieved 0 prior pregnancies.  She sees Dr. Davis.    The couple has not undergone prior intrauterine insemination procedures.    The couple has not undergone prior in-vitro fertilization procedures.    Dong Denny denies a history of exposure to harmful chemicals, toxins, and radiation.    No history of recent fevers greater than 101.5 degrees Farenheit.    No history of recent exposure to "wet heat."    No history of urological trauma or testicular torsion.    No history of prostatitis, epididymitis, and orchitis.    No history of post-pubertal mumps.    There is no known family history of fertility problems.    REVIEW OF SYSTEMS:     He denies headache, blurred vision, fever, nausea, vomiting, chills, abdominal pain, chest pain, sore throat, bleeding per rectum, cough, SOB, recent loss of consciousness, recent mental status changes, seizures, dizziness, or upper or lower extremity weakness.    PHYSICAL EXAM:     The patient generally appears in good health, is appropriately interactive, and is in no apparent distress.     Eyes: anicteric sclerae, moist " "conjunctivae; no lid-lag; PERRLA     HENT: Atraumatic; oropharynx clear with moist mucous membranes and no mucosal ulcerations;normal hard and soft palate.  No evidence of lymphadenopathy.    Neck: Trachea midline.  No thyromegaly.    Skin: No lesions.    Mental: Cooperative with normal affect.  Is oriented to time, place, and person.    Neuro: Grossly intact.    Chest: Normal inspiratory effort.   No accessory muscles.  No audible wheezes.  Respirations symmetric on inspiration and expiration.    Heart: Regular rhythm.      Abdomen:  Soft, non-tender. No masses or organomegaly. Bladder is not palpable. No evidence of flank discomfort. No evidence of inguinal hernia.    Genitourinary: Penis is normal with no evidence of plaques or induration. Urethral meatus is normal. Scrotum is normal. Testes are descended bilaterally with no evidence of abnormal masses or tenderness. Epididymis, vas deferens, and cord structures are normal bilaterally.  Testicular volume is approximately 18 cc bilaterally.    Extremities: No cyanosis, clubbing, or edema.    IMPRESSION & PLAN:    Mr. Denny is a 34 y.o.  male who has been  to his wife for the past 1 years. They have been trying to achieve a pregnancy for the past 10 months but without success. 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 He denies a history of erectile dysfunction and ejaculatory problems.    He's been on TRT in the past.  Managed by a "men's clinic."  Last injected 12/23.  Currently on clomid 50 mg taking QOD.  Also taking HCG and arimidex.  Also managed by a men's clinic.    1.  Stop all clomid, HCG, and arimidex.  FSH, LH, testosterone, prolactin, and estradiol serum levels in 6 weeks due to recent medication use.  2.  Semen analysis x 1 more.  Independently interpreted his outside SA's today.   3.  Return to the clinic in 6 weeks to discuss test results and treatment plan.  4.  Recommend avoiding " ""wet heat."  5.  Recommend taking a multivitamin and 500 mg of vitamin c daily in addition to the multivitamin.      CC:       "

## 2024-10-07 NOTE — PROGRESS NOTES
[facility]   Physical Therapy Treatment Note      Name: Dong Denny  Clinic Number: [id number]    Therapy Diagnosis:   Encounter Diagnosis   Name Primary?    Lumbosacral pain, chronic Yes       Physician: Ayla Donahue MD    Visit Date: 10/7/2024    Physician Orders: PT Eval and Treat   Medical Diagnosis: M53.3,G89.29 (ICD-10-CM) - Chronic left SI joint pain   Evaluation Date: 8/16/2024  Authorization Period Expiration: 12/31/2024  Plan of Care Certification Period: 8/16/2024 to 11/10/2024  Visit # / Visits authorized: 7/20 (+eval)  FOTO: 7/8  PTA visits: 1/5     Time In 15:00  Time Out: 15:50  Total Billable Time: 50 minutes (1 NM,  TH, MT, 1MTrac)  Precautions: Standard    Subjective     Patient reports: Coming back from [facility] to [place], thinks he averaged about 8 miles of walking a day, no issues. General body soreness from trip but SI feels good as he has not run since before trip.   He was compliant with home exercise program.  Response to previous treatment: no adverse effects  Functional change: none voiced    Pain: 4/10  Location: left SIJ at rest, walking fast or running/joggin    Objective      9/27/24:  (+) L Elaine's sign bilateral      (+) sacral springing  No pain with lumbar posterior to anterior mobs    Treatment     Dong received the treatments listed below:      Neuromuscular re-education activities to improve: Sense, Proprioception, and Posture for 15 minutes. The following activities were included:  Sidelying hip abd 2x15/each  Seated LAQs (motor control) 3x10/each red band   Prone press-ups 2x10  Bird dogs 2x5 bilaterally     Therapeutic activities to improve functional performance for 10 minutes, including:  Bridges 4x10 with blue band hip abd    Manual Therapy 10min:   LLE LAD 5 rounds (++ response)    Mechanical traction: lumbar: total time 15 minutes:  55# pull on: 20# off, 30 seconds on: 15 off      Patient Education and Home Exercises        Education provided:   - controlled loading program.     Written Home Exercises Provided: not today.     Assessment     Dong is a 33 y.o. male. Chronic left SI joint pain. Pt presents with > 1 year history of intermittent left SIJ pain. Mostly with dynamic walking and running activities.  Interval: Presents with general body soreness but minimal SI joint pain. Returning from recent vacation to [place]. Reports increased daily walking without aggravation of symptoms. General body soreness attributed to not sleeping in own bed. Trial of traction again today as patient continues to endorse positive response. Monitor response.      Dong Is progressing well towards his goals.   Patient prognosis is Excellent.     Patient will continue to benefit from skilled outpatient physical therapy to address the deficits listed in the problem list box on initial evaluation, provide pt/family education and to maximize pt's level of independence in the home and community environment.   Patient's spiritual, cultural and educational needs considered and pt agreeable to plan of care and goals.     Anticipated barriers to physical therapy: none    Goals:   Short Term Goals: 2-3 weeks: --> progressing towards  1.  Patient will demonstrate understanding of and compliance with home exercise program.   2.  Patient will report ability to complete walking the dog without left SIJ area pain.   3.  Patient will demonstrate no pain and normal lumbar motor control with seated knee extension and prone hip extension for pain-free transfers.     Long Term Goals: 8-12 weeks --> --> progressing towards  1. Patient will demonstrate ability to complete 10' jog without increased lumbosacral pain.   2. Patient will report <3/10 left SIJ area pain at worst with heavy activities at home and gym.   3. Patient with report <2% limitation on FOTO survey.     Plan   Lumbar motor control program.   Lumbar traction    Allison Brush, PTA

## 2024-10-11 ENCOUNTER — CLINICAL SUPPORT (OUTPATIENT)
Dept: REHABILITATION | Facility: HOSPITAL | Age: 34
End: 2024-10-11
Payer: COMMERCIAL

## 2024-10-11 DIAGNOSIS — G89.29 CHRONIC LEFT SI JOINT PAIN: ICD-10-CM

## 2024-10-11 DIAGNOSIS — M54.50 LUMBOSACRAL PAIN, CHRONIC: Primary | ICD-10-CM

## 2024-10-11 DIAGNOSIS — G89.29 LUMBOSACRAL PAIN, CHRONIC: Primary | ICD-10-CM

## 2024-10-11 DIAGNOSIS — M53.3 CHRONIC LEFT SI JOINT PAIN: ICD-10-CM

## 2024-10-11 PROCEDURE — 97140 MANUAL THERAPY 1/> REGIONS: CPT | Mod: PN

## 2024-10-11 PROCEDURE — 97530 THERAPEUTIC ACTIVITIES: CPT | Mod: PN

## 2024-10-11 PROCEDURE — 97112 NEUROMUSCULAR REEDUCATION: CPT | Mod: PN

## 2024-10-11 NOTE — PROGRESS NOTES
[facility]   Physical Therapy Treatment Note      Name: Dong Denny  Clinic Number: [id number]    Therapy Diagnosis:   Encounter Diagnoses   Name Primary?    Lumbosacral pain, chronic Yes    Chronic left SI joint pain        Physician: Ayla Doanhue MD    Visit Date: 10/11/2024    Physician Orders: PT Eval and Treat   Medical Diagnosis: M53.3,G89.29 (ICD-10-CM) - Chronic left SI joint pain   Evaluation Date: 8/16/2024  Authorization Period Expiration: 12/31/2024  Plan of Care Certification Period: 8/16/2024 to 11/10/2024  Visit # / Visits authorized: 8/20 (+eval)  FOTO: 8/8  PTA visits: 0/5     Time In 1100  Time Out: 1150  Total Billable Time: 50 minutes (1 NM,  2 TH, 1 MT)  Precautions: Standard    Subjective     Patient reports: mild left SIJ pain today.   He was compliant with home exercise program.  Response to previous treatment: no adverse effects  Functional change: none voiced    Pain: 4/10  Location: left SIJ at rest, walking fast or running/joggin    Objective      (+) left Elaine's sign    Treatment     Dong received the treatments listed below:      Neuromuscular re-education activities to improve: Sense, Proprioception, and Posture for 15 minutes. The following activities were included:  Sidelying hip abd 2x15/each  Seated LAQs (motor control) 3x10/each red band   Prone press-ups 2x10  Bird dogs 2x5 bilaterally     Therapeutic activities to improve functional performance for 25 minutes, including:  Bridges 4x10 with blue band hip abd  TM 3' walk - 2' walk grade 2 incline - run x 5 grade 2 incline - run x 5' grade 1 incline - walk 3' [4/10 pain during]    Manual Therapy 10min:   LLE LAD 5 rounds (++ response)        Patient Education and Home Exercises       Education provided:   - controlled loading program.     Written Home Exercises Provided: not today.     Assessment   Eval: Dong is a 33 y.o. male. Chronic left SI joint pain. Pt presents with > 1 year  history of intermittent left SIJ pain. Mostly with dynamic walking and running activities.    Interval: Re-introduced TM running; 4/10 pain. Voices usually a 7/10 pain during.      Dong Is progressing well towards his goals.   Patient prognosis is Excellent.     Patient will continue to benefit from skilled outpatient physical therapy to address the deficits listed in the problem list box on initial evaluation, provide pt/family education and to maximize pt's level of independence in the home and community environment.   Patient's spiritual, cultural and educational needs considered and pt agreeable to plan of care and goals.     Anticipated barriers to physical therapy: none    Goals:   Short Term Goals: 2-3 weeks: --> progressing towards  1.  Patient will demonstrate understanding of and compliance with home exercise program.   2.  Patient will report ability to complete walking the dog without left SIJ area pain.   3.  Patient will demonstrate no pain and normal lumbar motor control with seated knee extension and prone hip extension for pain-free transfers.     Long Term Goals: 8-12 weeks --> --> progressing towards  1. Patient will demonstrate ability to complete 10' jog without increased lumbosacral pain.   2. Patient will report <3/10 left SIJ area pain at worst with heavy activities at home and gym.   3. Patient with report <2% limitation on FOTO survey.     Plan   Lumbar motor control program.   Lumbar traction    Jorje Rehman, PT, DPT, OCS

## 2024-10-21 ENCOUNTER — PATIENT MESSAGE (OUTPATIENT)
Dept: UROLOGY | Facility: CLINIC | Age: 34
End: 2024-10-21
Payer: COMMERCIAL

## 2024-10-21 ENCOUNTER — OFFICE VISIT (OUTPATIENT)
Dept: PRIMARY CARE CLINIC | Facility: CLINIC | Age: 34
End: 2024-10-21
Payer: COMMERCIAL

## 2024-10-21 VITALS
WEIGHT: 169.31 LBS | OXYGEN SATURATION: 98 % | HEART RATE: 66 BPM | SYSTOLIC BLOOD PRESSURE: 110 MMHG | DIASTOLIC BLOOD PRESSURE: 80 MMHG | BODY MASS INDEX: 25 KG/M2

## 2024-10-21 DIAGNOSIS — E29.1 TESTICULAR FAILURE: ICD-10-CM

## 2024-10-21 DIAGNOSIS — M53.3 CHRONIC LEFT SI JOINT PAIN: Primary | ICD-10-CM

## 2024-10-21 DIAGNOSIS — G89.29 CHRONIC LEFT SI JOINT PAIN: Primary | ICD-10-CM

## 2024-10-21 PROBLEM — Z87.442 HISTORY OF KIDNEY STONES: Status: ACTIVE | Noted: 2024-10-21

## 2024-10-21 PROCEDURE — 99214 OFFICE O/P EST MOD 30 MIN: CPT | Mod: S$GLB,,, | Performed by: STUDENT IN AN ORGANIZED HEALTH CARE EDUCATION/TRAINING PROGRAM

## 2024-10-21 PROCEDURE — 99999 PR PBB SHADOW E&M-EST. PATIENT-LVL III: CPT | Mod: PBBFAC,,, | Performed by: STUDENT IN AN ORGANIZED HEALTH CARE EDUCATION/TRAINING PROGRAM

## 2024-10-21 NOTE — PROGRESS NOTES
SUBJECTIVE     Chief Complaint   Patient presents with    Establish Care       [facility]  Dong Denny is a very pleasant 34 y.o. male with medical diagnoses as listed in the medical history and problem list that presents to establish care with a new provider    Pt is UTD on age appropriate CA screening.    Family, social, surgical Hx reviewed     L SI joint pain: PT restarted 2 months ago. Going twice a week. Xray last year. No other imaging. No sciatica or weakness.  Hx of low back injury about yrs dead lifting.     Hx of TRT: 2/2 testicular failure. Following with urology. Previously on Arimidex and clomid. Currently holding. He and wife are undergoing fertility tx.      Health Maintenance         Date Due Completion Date    Influenza Vaccine (1) 09/01/2024 9/22/2023    COVID-19 Vaccine (6 - 2024-25 season) 09/01/2024 12/6/2021    TETANUS VACCINE 09/19/2026 9/19/2016    Hemoglobin A1c (Diabetic Prevention Screening) 07/26/2027 7/26/2024    Lipid Panel 07/26/2029 7/26/2024    RSV Vaccine (Age 60+ and Pregnant patients) (1 - 1-dose 75+ series) 10/03/2063 ---              PAST MEDICAL HISTORY:  Past Medical History:   Diagnosis Date    Androgenetic alopecia     Chronic rhinosinusitis     Dyslipidemia 2022    Kidney stones     Low testosterone        PAST SURGICAL HISTORY:  Past Surgical History:   Procedure Laterality Date    TOE SURGERY Left 2015    TONSILLECTOMY      TURBINATE RESECTION Bilateral 2010, 2006    done twice    TYMPANOSTOMY TUBE PLACEMENT Bilateral 1990       SOCIAL HISTORY:  Social History     Socioeconomic History    Marital status:    Occupational History    Occupation:    Tobacco Use    Smoking status: Never    Smokeless tobacco: Never   Substance and Sexual Activity    Alcohol use: Yes     Alcohol/week: 2.0 standard drinks of alcohol     Types: 2 Standard drinks or equivalent per week     Comment: socially    Drug use: No    Sexual activity: Yes     Partners: Female      Birth control/protection: None     Social Drivers of Health     Financial Resource Strain: Low Risk  (2024)    Overall Financial Resource Strain (CARDIA)     Difficulty of Paying Living Expenses: Not hard at all   Food Insecurity: No Food Insecurity (2024)    Hunger Vital Sign     Worried About Running Out of Food in the Last Year: Never true     Ran Out of Food in the Last Year: Never true   Physical Activity: Insufficiently Active (2024)    Exercise Vital Sign     Days of Exercise per Week: 4 days     Minutes of Exercise per Session: 30 min   Stress: Stress Concern Present (2024)    [employer] [place] [employer] - Occupational Stress Questionnaire     Feeling of Stress : To some extent   Housing Stability: Unknown (2024)    Housing Stability Vital Sign     Unable to Pay for Housing in the Last Year: No       FAMILY HISTORY:  Family History   Problem Relation Name Age of Onset    Celiac disease Mother      Diabetes Father Camron     No Known Problems Sister      No Known Problems Maternal Grandmother      No Known Problems Maternal Grandfather      No Known Problems Paternal Grandmother      No Known Problems Paternal Grandfather         ALLERGIES AND MEDICATIONS: updated and reviewed.  Review of patient's allergies indicates:  No Known Allergies  Current Outpatient Medications   Medication Sig Dispense Refill    azelastine-fluticasone (DYMISTA) 137-50 mcg/spray Spry nassal spray 1 spray by Each Nostril route 2 (two) times daily. 23 g 11    levocetirizine (XYZAL) 5 MG tablet Take 1 tablet (5 mg total) by mouth every evening. 30 tablet 11    tadalafiL (CIALIS) 5 MG tablet Take 1 tablet (5 mg total) by mouth daily as needed for Erectile Dysfunction. 30 tablet 11    anastrozole (ARIMIDEX) 1 mg Tab Take 1 mg by mouth. (Patient not taking: Reported on 10/21/2024.)      chorionic gonadotropin, human (PREGNYL) 10,000 unit injection SMARTSI Unit(s) SUB-Q Twice a Week (Patient not  taking: Reported on 10/21/2024)      clomiPHENE (CLOMID) 50 mg tablet Take 50 mg by mouth once daily. (Patient not taking: Reported on 10/21/2024)       No current facility-administered medications for this visit.       ROS  Review of Systems   Constitutional:  Negative for fever and weight loss.   Respiratory:  Negative for cough and shortness of breath.    Cardiovascular:  Negative for chest pain and palpitations.   Gastrointestinal:  Negative for abdominal pain, constipation, diarrhea, nausea and vomiting.   Genitourinary:  Negative for dysuria.   Musculoskeletal:  Positive for back pain. Negative for joint pain.   Skin:  Negative for rash.   Neurological:  Negative for dizziness, weakness and headaches.   Psychiatric/Behavioral:  Negative for depression. The patient is not nervous/anxious.            OBJECTIVE     Physical Exam  Vitals:    10/21/24 0859   BP: 110/80   Pulse: 66    Body mass index is 25 kg/m².  Weight: 76.8 kg (169 lb 5 oz)         Physical Exam  HENT:      Head: Normocephalic and atraumatic.      Nose: Nose normal.      Mouth/Throat:      Mouth: Mucous membranes are moist.      Pharynx: Oropharynx is clear.   Eyes:      Extraocular Movements: Extraocular movements intact.      Conjunctiva/sclera: Conjunctivae normal.      Pupils: Pupils are equal, round, and reactive to light.   Cardiovascular:      Rate and Rhythm: Normal rate and regular rhythm.   Pulmonary:      Effort: Pulmonary effort is normal.      Breath sounds: Normal breath sounds.   Musculoskeletal:         General: No swelling. Normal range of motion.      Cervical back: Normal range of motion.      Right lower leg: No edema.      Left lower leg: No edema.   Skin:     General: Skin is warm.      Findings: No lesion or rash.   Neurological:      General: No focal deficit present.      Mental Status: He is alert and oriented to person, place, and time.      Motor: No weakness.               ASSESSMENT     34 y.o. male with     1.  Chronic left SI joint pain    2. Testicular failure        PLAN:     1. Chronic left SI joint pain  -     Ambulatory referral/consult to Orthopedics; Future; Expected date: 10/28/2024  Ortho referral placed  Continue PT prn    2. Testicular failure  Continue regimen per urology instructions      Discussed age and gender appropriate screenings at this visit and encouraged a healthy diet low in simple carbohydrates, and increased physical activity.  Counseled on medically appropriate vaccines based on age and current health condition.  Screening test reviewed and discussed with patient.      RTC for annual exam 7/2025    Anna Mendoza MD

## 2024-10-22 NOTE — PROGRESS NOTES
DATE: 10/22/2024  PATIENT: Dong Denny    Supervising Physician: Troy Bliss M.D.    CHIEF COMPLAINT: left SI joint pain    HISTORY:  Dong Denny is a 34 y.o. male here for initial evaluation of low back pain (Back - 0, Leg - 0).  The pain in the left side of the lower back is what bothers him most.  The pain has been present for a year without specific injury. The patient describes the pain as sharp.  The pain is worse with running and laying flat and improved by rest. There is negative associated numbness and tingling. There is negative subjective weakness. Prior treatments have included PT for 8 weeks in the last 3 months with no relief, but no injections or surgery.    The patient denies myelopathic symptoms such as handwriting changes or difficulty with buttons/coins/keys. Denies perineal paresthesias, bowel/bladder dysfunction.    PAST MEDICAL/SURGICAL HISTORY:  Past Medical History:   Diagnosis Date    Androgenetic alopecia     Chronic rhinosinusitis     Dyslipidemia     Kidney stones     Low testosterone      Past Surgical History:   Procedure Laterality Date    TOE SURGERY Left     TONSILLECTOMY      TURBINATE RESECTION Bilateral ,     done twice    TYMPANOSTOMY TUBE PLACEMENT Bilateral        Medications:   Current Outpatient Medications on File Prior to Visit   Medication Sig Dispense Refill    anastrozole (ARIMIDEX) 1 mg Tab Take 1 mg by mouth. (Patient not taking: Reported on 10/21/2024.)      azelastine-fluticasone (DYMISTA) 137-50 mcg/spray Spry nassal spray 1 spray by Each Nostril route 2 (two) times daily. 23 g 11    chorionic gonadotropin, human (PREGNYL) 10,000 unit injection SMARTSI Unit(s) SUB-Q Twice a Week (Patient not taking: Reported on 10/21/2024)      clomiPHENE (CLOMID) 50 mg tablet Take 50 mg by mouth once daily. (Patient not taking: Reported on 10/21/2024)      levocetirizine (XYZAL) 5 MG tablet Take 1 tablet (5 mg total) by mouth  every evening. 30 tablet 11    tadalafiL (CIALIS) 5 MG tablet Take 1 tablet (5 mg total) by mouth daily as needed for Erectile Dysfunction. 30 tablet 11     No current facility-administered medications on file prior to visit.       Social History:   Social History     Socioeconomic History    Marital status:    Occupational History    Occupation:    Tobacco Use    Smoking status: Never    Smokeless tobacco: Never   Substance and Sexual Activity    Alcohol use: Yes     Alcohol/week: 2.0 standard drinks of alcohol     Types: 2 Standard drinks or equivalent per week     Comment: socially    Drug use: No    Sexual activity: Yes     Partners: Female     Birth control/protection: None     Social Drivers of Health     Financial Resource Strain: Low Risk  (7/24/2024)    Overall Financial Resource Strain (CARDIA)     Difficulty of Paying Living Expenses: Not hard at all   Food Insecurity: No Food Insecurity (7/24/2024)    Hunger Vital Sign     Worried About Running Out of Food in the Last Year: Never true     Ran Out of Food in the Last Year: Never true   Physical Activity: Insufficiently Active (7/24/2024)    Exercise Vital Sign     Days of Exercise per Week: 4 days     Minutes of Exercise per Session: 30 min   Stress: Stress Concern Present (7/24/2024)    [employer] [place] [employer] - Occupational Stress Questionnaire     Feeling of Stress : To some extent   Housing Stability: Unknown (7/24/2024)    Housing Stability Vital Sign     Unable to Pay for Housing in the Last Year: No       REVIEW OF SYSTEMS:  Constitution: Negative. Negative for chills, fever and night sweats.   Cardiovascular: Negative for chest pain and syncope.   Respiratory: Negative for cough and shortness of breath.   Gastrointestinal: See HPI. Negative for nausea/vomiting. Negative for abdominal pain.  Genitourinary: See HPI. Negative for discoloration or dysuria.  Skin: Negative for dry skin, itching and rash.    Hematologic/Lymphatic: Negative for bleeding problem. Does not bruise/bleed easily.   Musculoskeletal: Negative for falls and muscle weakness.   Neurological: See HPI. No seizures.   Endocrine: Negative for polydipsia, polyphagia and polyuria.   Allergic/Immunologic: Negative for hives and persistent infections.     EXAM:  There were no vitals taken for this visit.    General: The patient is a very pleasant 34 y.o. male in no apparent distress, the patient is oriented to person, place and time.  Psych: Normal mood and affect  HEENT: Vision grossly intact, hearing intact to the spoken word.  Lungs: Respirations unlabored.  Gait: Normal station and gait, no difficulty with toe or heel walk.   Skin: Dorsal lumbar skin negative for rashes, lesions, hairy patches and surgical scars. There is negative lumbar tenderness to palpation.  Range of motion: Lumbar range of motion is acceptable.  Spinal Balance: Global saggital and coronal spinal balance acceptable, not significant for scoliosis and kyphosis.  Musculoskeletal: No pain with the range of motion of the bilateral hips. No trochanteric tenderness to palpation.  Vascular: Bilateral lower extremities warm and well perfused, dorsalis pedis pulses 2+ bilaterally.  Neurological: Normal strength and tone in all major motor groups in the bilateral lower extremities. Normal sensation to light touch in the L2-S1 dermatomes bilaterally.  Deep tendon reflexes symmetric 2+ in the bilateral lower extremities.  Negative Babinski bilaterally. Straight leg raise negative bilaterally.    IMAGING:      Today I personally reviewed AP, Lat and Flex/Ex  upright L-spine films that demonstrate mild degenerative changes at L5-S1.      There is no height or weight on file to calculate BMI.    Hemoglobin A1C   Date Value Ref Range Status   07/26/2024 4.9 4.0 - 5.6 % Final     Comment:     ADA Screening Guidelines:  5.7-6.4%  Consistent with prediabetes  >or=6.5%  Consistent with  diabetes    High levels of fetal hemoglobin interfere with the HbA1C  assay. Heterozygous hemoglobin variants (HbS, HgC, etc)do  not significantly interfere with this assay.   However, presence of multiple variants may affect accuracy.     09/22/2023 5.0 4.0 - 5.6 % Final     Comment:     ADA Screening Guidelines:  5.7-6.4%  Consistent with prediabetes  >or=6.5%  Consistent with diabetes    High levels of fetal hemoglobin interfere with the HbA1C  assay. Heterozygous hemoglobin variants (HbS, HgC, etc)do  not significantly interfere with this assay.   However, presence of multiple variants may affect accuracy.     10/04/2022 4.9 4.0 - 5.6 % Final     Comment:     ADA Screening Guidelines:  5.7-6.4%  Consistent with prediabetes  >or=6.5%  Consistent with diabetes    High levels of fetal hemoglobin interfere with the HbA1C  assay. Heterozygous hemoglobin variants (HbS, HgC, etc)do  not significantly interfere with this assay.   However, presence of multiple variants may affect accuracy.             ASSESSMENT/PLAN:    There are no diagnoses linked to this encounter.    Today we discussed at length all of the different treatment options including anti-inflammatories, acetaminophen, rest, ice, heat, physical therapy including strengthening and stretching exercises, home exercises, ROM, aerobic conditioning, aqua therapy, other modalities including ultrasound, massage, and dry needling, epidural steroid injections and finally surgical intervention.      Pt presents with chronic low back pain. Failure of conservative rx. Will obtain MRI to further evaluate and call with results.

## 2024-10-24 ENCOUNTER — OFFICE VISIT (OUTPATIENT)
Dept: ORTHOPEDICS | Facility: CLINIC | Age: 34
End: 2024-10-24
Payer: COMMERCIAL

## 2024-10-24 VITALS — WEIGHT: 169.31 LBS | BODY MASS INDEX: 25.08 KG/M2 | HEIGHT: 69 IN

## 2024-10-24 DIAGNOSIS — G89.29 CHRONIC LEFT SI JOINT PAIN: ICD-10-CM

## 2024-10-24 DIAGNOSIS — M53.3 CHRONIC LEFT SI JOINT PAIN: ICD-10-CM

## 2024-10-24 DIAGNOSIS — M54.9 DORSALGIA, UNSPECIFIED: Primary | ICD-10-CM

## 2024-10-24 DIAGNOSIS — M51.362 DEGENERATION OF INTERVERTEBRAL DISC OF LUMBAR REGION WITH DISCOGENIC BACK PAIN AND LOWER EXTREMITY PAIN: Primary | ICD-10-CM

## 2024-10-24 PROCEDURE — 99204 OFFICE O/P NEW MOD 45 MIN: CPT | Mod: S$GLB,,, | Performed by: ORTHOPAEDIC SURGERY

## 2024-10-24 PROCEDURE — 99999 PR PBB SHADOW E&M-EST. PATIENT-LVL III: CPT | Mod: PBBFAC,,, | Performed by: ORTHOPAEDIC SURGERY

## 2024-11-01 ENCOUNTER — HOSPITAL ENCOUNTER (OUTPATIENT)
Dept: RADIOLOGY | Facility: HOSPITAL | Age: 34
Discharge: HOME | End: 2024-11-01
Attending: ORTHOPAEDIC SURGERY
Payer: COMMERCIAL

## 2024-11-01 DIAGNOSIS — M54.9 DORSALGIA, UNSPECIFIED: ICD-10-CM

## 2024-11-01 PROCEDURE — 72148 MRI LUMBAR SPINE W/O DYE: CPT | Mod: TC

## 2024-11-01 PROCEDURE — 72148 MRI LUMBAR SPINE W/O DYE: CPT | Mod: 26,,, | Performed by: RADIOLOGY

## 2024-11-04 ENCOUNTER — OFFICE VISIT (OUTPATIENT)
Dept: ORTHOPEDICS | Facility: CLINIC | Age: 34
End: 2024-11-04
Payer: COMMERCIAL

## 2024-11-04 ENCOUNTER — TELEPHONE (OUTPATIENT)
Dept: PAIN MEDICINE | Facility: CLINIC | Age: 34
End: 2024-11-04
Payer: COMMERCIAL

## 2024-11-04 DIAGNOSIS — G89.29 CHRONIC LEFT SI JOINT PAIN: Primary | ICD-10-CM

## 2024-11-04 DIAGNOSIS — M53.3 CHRONIC LEFT SI JOINT PAIN: Primary | ICD-10-CM

## 2024-11-04 DIAGNOSIS — M46.1 SACROILIITIS: Primary | ICD-10-CM

## 2024-11-04 PROCEDURE — 99441 PR PHYSICIAN TELEPHONE EVALUATION 5-10 MIN: CPT | Mod: 95,,, | Performed by: ORTHOPAEDIC SURGERY

## 2024-11-04 NOTE — TELEPHONE ENCOUNTER
----- Message from Anna Carlos PA-C sent at 2024 10:15 AM CST -----  Regarding: Order for JULY PENA    Patient Name: JULY PENA([id number])  Sex: Male  : 1990      PCP: TOM SIMMS    Center: [facility]     Level of Service:86926     RI OFFICE/OUTPT VISIT, EST, LEVL III, 20-29 MIN    Types of orders made on 2024: Procedure Request    Order Date:2024  Ordering User:ANNA CARLOS [[id number]]  Encounter Provider:Anna Carlos PA-C [[id number]]  Authorizing Provider: Anna Carlos PA-C [[id number]]  Supervising Provider:DEMIAN RODRIGUEZ [[id number]]  Type of Supervision:Supervision Required  Department:[facility][[id number]]    Common Order Information  Procedure -> Sacroiliac Injection (Specify laterality) Cmt: LEFT    Order Specific Information  Order: Procedure Order to Pain Management [Custom: [id number]]  Order #:          [id number]: 1 FUTURE    Priority: Routine  Class: Clinic Performed    Future Order Information      Expires on:2025            Expected by:2024                   Associated Diagnoses      M53.3, G89.29 Chronic left SI joint pain      Facility Name: -> [facility]           Priority: Routine  Class: Clinic Performed    Future Order Information      Expires on:2025            Expected by:2024                   Associated Diagnoses      M53.3, G89.29 Chronic left SI joint pain      Procedure -> Sacroiliac Injection (Specify laterality) Cmt: LEFT        Facility Name: -> [facility]

## 2024-11-15 ENCOUNTER — TELEPHONE (OUTPATIENT)
Dept: PAIN MEDICINE | Facility: CLINIC | Age: 34
End: 2024-11-15
Payer: COMMERCIAL

## 2024-11-18 ENCOUNTER — PATIENT MESSAGE (OUTPATIENT)
Dept: UROLOGY | Facility: CLINIC | Age: 34
End: 2024-11-18
Payer: COMMERCIAL

## 2024-11-18 ENCOUNTER — LAB VISIT (OUTPATIENT)
Dept: LAB | Facility: HOSPITAL | Age: 34
End: 2024-11-18
Attending: UROLOGY
Payer: COMMERCIAL

## 2024-11-18 DIAGNOSIS — E29.1 TESTICULAR FAILURE: ICD-10-CM

## 2024-11-18 LAB
ESTRADIOL SERPL-MCNC: 17 PG/ML (ref 11–44)
FSH SERPL-ACNC: 1.99 MIU/ML (ref 0.95–11.95)
LH SERPL-ACNC: 1.4 MIU/ML (ref 0.6–12.1)
PROLACTIN SERPL IA-MCNC: 15.6 NG/ML (ref 3.5–19.4)
TESTOST SERPL-MCNC: 330 NG/DL (ref 304–1227)

## 2024-11-18 PROCEDURE — 84146 ASSAY OF PROLACTIN: CPT | Performed by: UROLOGY

## 2024-11-18 PROCEDURE — 83002 ASSAY OF GONADOTROPIN (LH): CPT | Performed by: UROLOGY

## 2024-11-18 PROCEDURE — 83001 ASSAY OF GONADOTROPIN (FSH): CPT | Performed by: UROLOGY

## 2024-11-18 PROCEDURE — 84403 ASSAY OF TOTAL TESTOSTERONE: CPT | Performed by: UROLOGY

## 2024-11-18 PROCEDURE — 36415 COLL VENOUS BLD VENIPUNCTURE: CPT | Mod: PO | Performed by: UROLOGY

## 2024-11-18 PROCEDURE — 82670 ASSAY OF TOTAL ESTRADIOL: CPT | Performed by: UROLOGY

## 2024-11-20 ENCOUNTER — TELEPHONE (OUTPATIENT)
Dept: PAIN MEDICINE | Facility: CLINIC | Age: 34
End: 2024-11-20
Payer: COMMERCIAL

## 2024-11-20 ENCOUNTER — OFFICE VISIT (OUTPATIENT)
Dept: UROLOGY | Facility: CLINIC | Age: 34
End: 2024-11-20
Payer: COMMERCIAL

## 2024-11-20 VITALS
WEIGHT: 167.56 LBS | HEART RATE: 62 BPM | DIASTOLIC BLOOD PRESSURE: 78 MMHG | SYSTOLIC BLOOD PRESSURE: 110 MMHG | HEIGHT: 69 IN | BODY MASS INDEX: 24.82 KG/M2

## 2024-11-20 DIAGNOSIS — E29.1 TESTICULAR FAILURE: Primary | ICD-10-CM

## 2024-11-20 PROBLEM — Z87.442 HISTORY OF KIDNEY STONES: Status: RESOLVED | Noted: 2024-10-21 | Resolved: 2024-11-20

## 2024-11-20 PROCEDURE — 99214 OFFICE O/P EST MOD 30 MIN: CPT | Mod: S$GLB,,, | Performed by: UROLOGY

## 2024-11-20 PROCEDURE — 99999 PR PBB SHADOW E&M-EST. PATIENT-LVL III: CPT | Mod: PBBFAC,,, | Performed by: UROLOGY

## 2024-11-20 NOTE — PRE-PROCEDURE INSTRUCTIONS
Unable to reach pt via phone.  Left voicemail with arrival time also informing pt of need for responsible  accompaniment and instructing pt to follow pre-procedure instructions provided via [facility] portal.  The following message was sent to pt's portal.        Dear Dong,     Please read over the following pre-procedure instructions in it's entirety as there is helpful information here to get you well prepared for your upcoming procedure.     You are scheduled for a procedure with Dr. BRIANNA Fritz on 11/22/2024.     [facility] at the corner of [facility] and [facility]. It is in the [place] [place] next to [employer]. The address is: [address] [place] [address]. Take the elevator to the 2nd floor.       Registration check in time: 6:00 am  Procedure scheduled for time: 7:00 am     If you are receiving sedation, you CANNOT drive yourself and must have a responsible friend or family member (no rideshare) to drive you home.        You should take any medications that you routinely take for blood pressure, heart medications, thyroid, cholesterol, etc.      The fasting restrictions are dependent on whether or not you are receiving sedation. Sedation is not available for all procedures.      Your fasting instructions/Sedation type are as follow:  No sedation.  You do not need to fast before this procedure.  You can eat and drink like normal.  You can drive yourself (with stipulations).  There are some rare cases where you may need to call an uber if you are unable to drive after the procedure due to weakness/dizziness.         If you are on blood thinners, you need to follow the anticoagulation instructions that had been discussed previously. You should only stop the blood thinners if it was approved by your primary care physician or your cardiologist. In the event that you are not able to stop your blood thinners, a blood thinner was not listed on your medication list, or we were not able to get  clearance from your cardiologist, then the procedure may have to be postponed/canceled.      IF you were told to stop your blood thinners, this is how long you should generally hold some of the more common ones. Remember that stopping blood thinners is only necessary for certain procedures. If you are unsure of your instructions, please call us.   Aspirin - 5 days  Plavix/Clopidogrel - 7 days  Warfarin / Coumadin - 5 days  Eliquis - 3 days  Pradaxa/Dabigatran - 4 days  Xarelto/Rivaroxaban - 3 days     If you are a diabetic, do not take your medication if you will be fasting, but bring it with you. Please plan on being here for roughly 2-3 hours.      HOLD all non-insulin injections (shots) until after surgery (Ozempic, Mounjaro, Trulicity, Victoza, Byetta, Wegovy and Adlyxin) (Total of 7 days prior)       Please call us if you have been sick (running fever, having any flu-like symptoms) or have been taking ANTIBIOTICS in the past 2 weeks or had any outpatient procedures other than with us (colonoscopy, endoscopy, OBGYN, dental, etc.).      If you have been previously COVID positive, you will need to hold off on your procedure until you are symptom free for 10 days. If you did not have any symptoms, you can have your procedure 10 days from your positive test result.         On the morning of your procedure:  *HOLD ALL VITAMINS, MINERALS, HERBS (INCLUDING HERBAL TEAS) AND SUPPLEMENTS  *SHOWER WITH ANTIBACTERIAL SOAP (EX. DIAL) NIGHT BEFORE AND MORNING OF PROCEDURE  *DO NOT APPLY ANY LOTIONS, OILS, POWDERS, PERFUME/COLOGNE, OINTMENTS, GELS, CREAMS, MAKEUP OR DEODORANT TO YOUR SKIN MORNING OF PROCEDURE  *LEAVE JEWELRY AND ANY VALUABLES AT HOME  *WEAR LOOSE COMFORTABLE CLOTHING         Please reply to this portal message as receipt of delivery.     Thank you,  [facility]  [place], LPN [facility]  Pre-Admit

## 2024-11-20 NOTE — PROGRESS NOTES
"Chief Complaint:  Infertility    HPI:    Mr. Denny is a 34 y.o.  male who has been  to his wife for the past 1 years. They have been trying to achieve a pregnancy for the past 10 months but without success. 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 He denies a history of erectile dysfunction and ejaculatory problems.    He's been on TRT in the past.  Managed by a "men's clinic."  Last injected .  Was on clomid 50 mg taking QOD.  Also taking HCG and arimidex.  Also managed by a men's clinic. He stopped this 10/24.  Hormonal panel off this is normal.    Lab Results   Component Value Date    TOTALTESTOST 330 2024    LABLH 1.4 2024    FSH 1.99 2024    ESTRADIOL 17 2024    PROLACTIN 15.6 2024      SA 24--1.0 cc/169 million per cc/81%/6%  SA 11/15/24--4.0 cc/120 million per cc/81%/6%  FOR REVIEW FROM PREVIOUS:    Mr. Denny is a 34 y.o.  male who has been  to his wife for the past 1 years. They have been trying to achieve a pregnancy for the past 10 months but without success. 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 He denies a history of erectile dysfunction and ejaculatory problems.    He's been on TRT in the past.  Managed by a "men's clinic."  Last injected .  Currently on clomid 50 mg taking QOD.  Also taking HCG and arimidex.  Also managed by a men's clinic.    He has achieved 1 pregnancies in the past with a different partner that was terminated electively.    SA 24--1.0 cc/169 million per cc/81%/6%    Alejandra Denny is 34 years old. ( [birth year]) Her menses are regular. She has not undergone prior hysterosalpingogram. She has achieved 0 prior pregnancies.  She sees Dr. Davis.    The couple has not undergone prior intrauterine insemination procedures.    The couple has not undergone prior in-vitro fertilization " "procedures.    Dong Denny denies a history of exposure to harmful chemicals, toxins, and radiation.    No history of recent fevers greater than 101.5 degrees Farenheit.    No history of recent exposure to "wet heat."    No history of urological trauma or testicular torsion.    No history of prostatitis, epididymitis, and orchitis.    No history of post-pubertal mumps.    There is no known family history of fertility problems.    REVIEW OF SYSTEMS:     He denies headache, blurred vision, fever, nausea, vomiting, chills, abdominal pain, chest pain, sore throat, bleeding per rectum, cough, SOB, recent loss of consciousness, recent mental status changes, seizures, dizziness, or upper or lower extremity weakness.    PHYSICAL EXAM:     The patient generally appears in good health, is appropriately interactive, and is in no apparent distress.     Eyes: anicteric sclerae, moist conjunctivae; no lid-lag; PERRLA     HENT: Atraumatic; oropharynx clear with moist mucous membranes and no mucosal ulcerations;normal hard and soft palate.  No evidence of lymphadenopathy.    Neck: Trachea midline.  No thyromegaly.    Skin: No lesions.    Mental: Cooperative with normal affect.  Is oriented to time, place, and person.    Neuro: Grossly intact.    Chest: Normal inspiratory effort.   No accessory muscles.  No audible wheezes.  Respirations symmetric on inspiration and expiration.    Heart: Regular rhythm.      Abdomen:  Soft, non-tender. No masses or organomegaly. Bladder is not palpable. No evidence of flank discomfort. No evidence of inguinal hernia.    Genitourinary: Penis is normal with no evidence of plaques or induration. Urethral meatus is normal. Scrotum is normal. Testes are descended bilaterally with no evidence of abnormal masses or tenderness. Epididymis, vas deferens, and cord structures are normal bilaterally.  Testicular volume is approximately 18 cc bilaterally.    Extremities: No cyanosis, clubbing, or " "edema.    IMPRESSION & PLAN:    Mr. Denny is a 34 y.o.  male who has been  to his wife for the past 1 years. They have been trying to achieve a pregnancy for the past 10 months but without success. 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 He denies a history of erectile dysfunction and ejaculatory problems.    He's been on TRT in the past.  Managed by a "men's clinic."  Last injected 12/23.  Was on clomid 50 mg taking QOD.  Also taking HCG and arimidex.  Also managed by a men's clinic. He stopped this 10/24.  Hormonal panel off this is normal.    Lab Results   Component Value Date    TOTALTESTOST 330 11/18/2024    LABLH 1.4 11/18/2024    FSH 1.99 11/18/2024    ESTRADIOL 17 11/18/2024    PROLACTIN 15.6 11/18/2024      SA 8/16/24--1.0 cc/169 million per cc/81%/6%  SA 11/15/24--4.0 cc/120 million per cc/81%/6%    1.  Independently interpreted his labs and outside SA's today.   2.  His hormonal panel is normal.  Stay off all T, HCG, clomid, arimidex.  3.  From a male factor perspective efforts with natural means, IUI, and IVF are all appropriate.  4.  Recommend avoiding "wet heat."  5.  Recommend taking a multivitamin and 500 mg of vitamin c daily in addition to the multivitamin.  6. RTC 6 months if not pregnant by then.    CC:   Robles      "

## 2024-11-20 NOTE — LETTER
November 20, 2024        Ivette Arboleda MD  [address]   [facility]  [facility] [address]             [facility] 4th Fl  [address]  [place] [address]  Phone: [phone]   Patient: Dong Denny   MR Number: [id number]   YOB: 1990   Date of Visit: 11/20/2024       Dear Dr. Arboleda:    Thank you for referring Dong Denny to me for evaluation. Attached you will find relevant portions of my assessment and plan of care.    If you have questions, please do not hesitate to call me. I look forward to following Dong Denny along with you.    Sincerely,      Rickey Zee MD            CC  No Recipients    Enclosure

## 2024-11-21 NOTE — DISCHARGE INSTRUCTIONS
[facility]/[place]  Dr. Curt Fritz  Messaging service # [phone]    POST-PROCEDURE INSTRUCTIONS:  HELPFUL TIPS:    Except for block procedures (medial branch blocks, genicular blocks, hip/shoulder blocks), most procedures take about 2 weeks to start seeing the full effect toward your pain.  If you feel like the pain relief goes away after a day, please wait up to 2 weeks to decide if the procedure provided you any relief    If you had a block procedure (medial branch blocks, genicular blocks, hip/shoulder blocks), you MUST submit your pain diary and percentage relief scores to proceed with the next block and/or procedure.  Please submit the diary/percentages through the [facility] OR you can call [phone] ([employer]) OR [phone] ([employer]) during clinic hours (Monday - Friday, 8AM - 5PM)    Follow up in 2 weeks with either the provider that suggested this procedure OR with Dr. Fritz (including his team members at [facility]).  You may already have a follow up appointment scheduled.  If not, you may make an appointment through the [facility] or you can contact the office that suggested your procedure.  If you would like to make an appointment with Dr. Fritz, you may do so through the [facility] Portal OR you can call [phone] ([employer]) OR [phone] ([employer]) during clinic hours (Monday - Friday, 8AM - 5PM).      What you need to do:    Keep a record of your response to the injection you had today.    How much relief did you get?   When did the relief start and how long did it last?  Were you able to decrease the use of any of your pain medications?  Were you able to increase your level of activity?  How long did the relief last?    What to watch out for:    If you experience any of the following symptoms after your procedure, please notify the messaging service immediately (see above for contact information):   fever (increased oral temperature)   bleeding or  swelling at the injection site,    drainage, rash or redness at the injection site    possible signs of infection    increased pain at the injection site   worsening of your usual pain   severe headache   new or worsening numbness    new arm and/or leg weakness, or    changes in bowel and/or bladder function: urinating or defecating on yourself and not knowing that you did it.    PLEASE FOLLOW ALL INSTRUCTIONS CAREFULLY     Do not engage in strenuous activity (e.g., lifting or pushing heavy objects or repeated bending) for 24 hours.     Do not take a bath, swim or use Jacuzzi for 24 hours after procedure. (A shower is fine).   Remove any Band-Aids when you get home.    Use cold/ice, as needed for comfort.  We recommend the use of cold therapy alternating on for 20 minutes, off for 20 minutes.    Do not apply direct heat (heating pad or heat packs) to the injection site for 24 hours.     Resume your usual medications, unless instructed otherwise by your Pain Physician.     If you are on warfarin (Coumadin) or other blood thinner, resume this medication as instructed by your prescribing Physician.    IF AT ANY POINT YOU ARE VERY CONCERNED ABOUT YOUR SYMPTOMS, PLEASE GO TO THE EMERGENCY ROOM.    If you develop worsening pain, weakness, numbness, lose bowel or bladder control (i.e., having an accident where you did not even know you had to go to the bathroom and suddenly noticed you soiled yourself), saddle anesthesia (a loss of sensation restricted to the area of the buttocks, anus and between the legs -- i.e., those parts of your body that would touch a saddle if you were sitting on one) you need to go immediately to the emergency department for evaluation and treatment.    ----------------------------------------------------------------------------------------------------------------------------------------------------------------  If you received Sedation please read the following instructions:  POST SEDATION  INSTRUCTIONS    Today you received intravenous medication (also known as sedation) that was used to help you relax and/or decrease discomfort during your procedure. This medication will be acting in your body for the next 24 hours, so you might feel a little tired or sleepy. This feeling will slowly wear off.   Common side effects associated with these medications include: drowsiness, dizziness, sleepiness, confusion, feeling excited, difficulty remembering things, lack of steadiness with walking or balance, loss of fine muscle control, slowed reflexes, difficulty focusing, and blurred vision.  Some over-the-counter and prescription medications (e.g., muscle relaxants, opioids, mood-altering medications, sedatives/hypnotics, antihistamines) can interact with the intravenous medication you received and cause an increased risk of the side effects listed above in addition to other potentially life threatening side effects. Use extreme caution if you are taking such medications, and consult with your Pain Physician or prescribing physician if you have any questions.  For the next 12-24 hours:    DO NOT--Drive a car, operate machinery or power tools   DO NOT--Drink any alcoholic beverages (not even beer), they may dangerously increase the risk of side effects.    DO NOT--Make any important legal or business decisions or sign important documents.  We advise you to have someone to assist you at home. Move slowly and carefully. Do not make sudden changes in position. Be aware of dizziness or light-headedness and move accordingly.   If you seek medical treatment within 24 hours, let the nurse or doctor caring for you know that you have received the above medications. If you have any questions or concerns related to your sedation or treatment today please contact us.

## 2024-11-22 ENCOUNTER — HOSPITAL ENCOUNTER (OUTPATIENT)
Facility: HOSPITAL | Age: 34
Discharge: HOME | End: 2024-11-22
Attending: STUDENT IN AN ORGANIZED HEALTH CARE EDUCATION/TRAINING PROGRAM | Admitting: STUDENT IN AN ORGANIZED HEALTH CARE EDUCATION/TRAINING PROGRAM
Payer: COMMERCIAL

## 2024-11-22 VITALS
HEIGHT: 69 IN | WEIGHT: 160 LBS | BODY MASS INDEX: 23.7 KG/M2 | SYSTOLIC BLOOD PRESSURE: 123 MMHG | RESPIRATION RATE: 17 BRPM | TEMPERATURE: 98 F | DIASTOLIC BLOOD PRESSURE: 81 MMHG | HEART RATE: 65 BPM | OXYGEN SATURATION: 100 %

## 2024-11-22 DIAGNOSIS — G89.29 CHRONIC PAIN: ICD-10-CM

## 2024-11-22 DIAGNOSIS — M46.1 SACROILIITIS: Primary | ICD-10-CM

## 2024-11-22 PROCEDURE — 27096 INJECT SACROILIAC JOINT: CPT | Mod: LT,,, | Performed by: STUDENT IN AN ORGANIZED HEALTH CARE EDUCATION/TRAINING PROGRAM

## 2024-11-22 PROCEDURE — 63600175 PHARM REV CODE 636 W HCPCS: Performed by: STUDENT IN AN ORGANIZED HEALTH CARE EDUCATION/TRAINING PROGRAM

## 2024-11-22 PROCEDURE — 25500020 PHARM REV CODE 255: Performed by: STUDENT IN AN ORGANIZED HEALTH CARE EDUCATION/TRAINING PROGRAM

## 2024-11-22 PROCEDURE — 27096 INJECT SACROILIAC JOINT: CPT | Mod: LT | Performed by: STUDENT IN AN ORGANIZED HEALTH CARE EDUCATION/TRAINING PROGRAM

## 2024-11-22 RX ORDER — BUPIVACAINE HYDROCHLORIDE 2.5 MG/ML
INJECTION, SOLUTION EPIDURAL; INFILTRATION; INTRACAUDAL
Status: DISCONTINUED | OUTPATIENT
Start: 2024-11-22 | End: 2024-11-22 | Stop reason: HOSPADM

## 2024-11-22 RX ORDER — LIDOCAINE HYDROCHLORIDE 20 MG/ML
INJECTION, SOLUTION EPIDURAL; INFILTRATION; INTRACAUDAL; PERINEURAL
Status: DISCONTINUED | OUTPATIENT
Start: 2024-11-22 | End: 2024-11-22 | Stop reason: HOSPADM

## 2024-11-22 RX ORDER — TRIAMCINOLONE ACETONIDE 40 MG/ML
INJECTION, SUSPENSION INTRA-ARTICULAR; INTRAMUSCULAR
Status: DISCONTINUED | OUTPATIENT
Start: 2024-11-22 | End: 2024-11-22 | Stop reason: HOSPADM

## 2024-11-22 NOTE — H&P
"HPI  Patient presenting for Procedure(s) (LRB):  LT SI Joint inj (Left)     Patient on Anti-coagulation No    No health changes since previous encounter    Past Medical History:   Diagnosis Date    Androgenetic alopecia     Chronic rhinosinusitis     Dyslipidemia 2022    History of kidney stones 10/21/2024    Kidney stones     Low testosterone      Past Surgical History:   Procedure Laterality Date    TOE SURGERY Left 2015    TONSILLECTOMY      TURBINATE RESECTION Bilateral 2010, 2006    done twice    TYMPANOSTOMY TUBE PLACEMENT Bilateral 1990     Review of patient's allergies indicates:  No Known Allergies   No current facility-administered medications for this encounter.       PMHx, PSHx, Allergies, Medications reviewed in epic    ROS negative except pain complaints in HPI    OBJECTIVE:    BP (!) 136/91 (BP Location: Left arm, Patient Position: Lying)   Pulse 72   Temp 97.8 °F (36.6 °C) (Temporal)   Resp 18   Ht 5' 9" (1.753 m)   Wt 72.6 kg (160 lb)   SpO2 100%   BMI 23.63 kg/m²     PHYSICAL EXAMINATION:    GENERAL: Well appearing, in no acute distress, alert and oriented x3.  PSYCH:  Mood and affect appropriate.  SKIN: Skin color, texture, turgor normal, no rashes or lesions which will impact the procedure.  CV: RRR with palpation of the radial artery.  PULM: No evidence of respiratory difficulty, symmetric chest rise. Clear to auscultation.  NEURO: Cranial nerves grossly intact.    Plan:    Proceed with procedure as planned Procedure(s) (LRB):  LT SI Joint inj (Left)    Nino Mary Jo  11/22/2024            "

## 2024-11-22 NOTE — OP NOTE
Sacroiliac Joint Injection under Fluoroscopic Guidance    The procedure, risks, benefits, and options were discussed with the patient. There are no contraindications to the procedure. The patent expressed understanding and agreed to the procedure. Informed written consent was obtained prior to the start of the procedure and can be found in the patient's chart.    PATIENT NAME: Dong Denny   MRN: [id number]     DATE OF PROCEDURE: 11/22/2024    PROCEDURE: Left Sacroiliac Joint Injection under Fluoroscopic Guidance    PRE-OP DIAGNOSIS: Sacroiliitis [M46.1]    POST-OP DIAGNOSIS: Same    PHYSICIAN: Curt Fritz MD    ASSISTANTS: Arya Behkradi, MD     MEDICATIONS INJECTED: Preservative-free Kenalog 40mg with 3cc of Bupivacine 0.25%     LOCAL ANESTHETIC INJECTED: Xylocaine 2%     SEDATION: None    ESTIMATED BLOOD LOSS: None    COMPLICATIONS: None    TECHNIQUE: Time-out was performed to identify the patient and procedure to be performed. With the patient laying in a prone position, the surgical area was prepped and draped in the usual sterile fashion using ChloraPrep and a fenestrated drape. The sacroiliac joint was determined under fluoroscopy guidance. Skin anesthesia was achieved by injecting Lidocaine 2% over the injection site. The sacroiliac joint was  then approached with a 22 gauge, 3.5 inch spinal quinke needle that was introduced under fluoroscopic guidance in the AP and Lateral views. Once the needle tip was in the area of the joint, and there was no blood, contrast dye Omnipaque (300mg/mL) was injected to confirm placement and there was no vascular runoff. Fluoroscopic imaging in the AP and lateral views revealed a clear outline of the joint space. 4 mL of the medication mixture listed above was injected slowly intraarticular and pili-articular. Displacement of the radio opaque contrast after injection of the medication confirmed that the medication went into the area of the joint. The needles were removed and  bleeding was nil. A sterile dressing was applied. No specimens collected. The patient tolerated the procedure well.     The patient was monitored after the procedure in the recovery area. They were given post-procedure and discharge instructions to follow at home. The patient was discharged in a stable condition.    Curt Fritz MD

## 2024-11-22 NOTE — PLAN OF CARE
Discharge instructions given and explained to patient with verbalization of understanding all instructions. Patients v/s stable, denies n/v and tolerating po, rates pain level tolerable and ready for patient discharge home.

## 2024-11-22 NOTE — DISCHARGE SUMMARY
Discharge Note  Short Stay      SUMMARY     Admit Date: 11/22/2024    Attending Physician: Curt Fritz MD PhD    Discharge Physician: Curt Fritz      Discharge Date: 11/22/2024 7:18 AM    Procedure(s) (LRB):  LT SI Joint inj (Left)    Final Diagnosis: Sacroiliitis [M46.1]    Disposition: Home or self care    Patient Instructions:   Current Discharge Medication List        CONTINUE these medications which have NOT CHANGED    Details   azelastine-fluticasone (DYMISTA) 137-50 mcg/spray Spry nassal spray 1 spray by Each Nostril route 2 (two) times daily.  Qty: 23 g, Refills: 11      levocetirizine (XYZAL) 5 MG tablet Take 1 tablet (5 mg total) by mouth every evening.  Qty: 30 tablet, Refills: 11    Associated Diagnoses: Allergic rhinitis, unspecified seasonality, unspecified trigger; Chronic rhinitis      tadalafiL (CIALIS) 5 MG tablet Take 1 tablet (5 mg total) by mouth daily as needed for Erectile Dysfunction.  Qty: 30 tablet, Refills: 11                 Discharge Diagnosis: Sacroiliitis [M46.1]  Condition on Discharge: Stable with no complications to procedure   Diet on Discharge: Same as before.  Activity: as per instruction sheet.  Discharge to: Home with a responsible adult.  Follow up: 2-4 weeks       Please call my office or pager at [phone] if experienced any weakness or loss of sensation, fever > 101.5, pain uncontrolled with oral medications, persistent nausea/vomiting/or diarrhea, redness or drainage from the incisions, or any other worrisome concerns. If physician on call was not reached or could not communicate with our office for any reason please go to the nearest emergency department      Curt Fritz MD PhD

## 2025-01-06 DIAGNOSIS — Z00.00 ROUTINE MEDICAL EXAM: Primary | ICD-10-CM

## 2025-01-10 ENCOUNTER — PATIENT MESSAGE (OUTPATIENT)
Dept: ORTHOPEDICS | Facility: CLINIC | Age: 35
End: 2025-01-10
Payer: COMMERCIAL

## 2025-01-10 DIAGNOSIS — R10.2 PELVIC AND PERINEAL PAIN: Primary | ICD-10-CM

## 2025-01-10 NOTE — PROGRESS NOTES
Spoke with pt virtually. He had a left SIJ injection on 11/22/24 with minimal relief and continues to have isolated left sided low back pain. He has continued home exercises. Will obtain CT pelvis to further evaluate and call with results. At that point will most likely schedule with Dr. Fritz with pain management to discuss further injection options.

## 2025-01-16 ENCOUNTER — HOSPITAL ENCOUNTER (OUTPATIENT)
Dept: RADIOLOGY | Facility: HOSPITAL | Age: 35
Discharge: HOME | End: 2025-01-16
Attending: ORTHOPAEDIC SURGERY
Payer: COMMERCIAL

## 2025-01-16 DIAGNOSIS — R10.2 PELVIC AND PERINEAL PAIN: ICD-10-CM

## 2025-01-16 PROCEDURE — 72192 CT PELVIS W/O DYE: CPT | Mod: 26,,, | Performed by: RADIOLOGY

## 2025-01-16 PROCEDURE — 72192 CT PELVIS W/O DYE: CPT | Mod: TC

## 2025-02-10 ENCOUNTER — TELEPHONE (OUTPATIENT)
Dept: ORTHOPEDICS | Facility: CLINIC | Age: 35
End: 2025-02-10
Payer: COMMERCIAL

## 2025-02-10 NOTE — TELEPHONE ENCOUNTER
Attempt to contact patient regarding his ct results. Left message for patient to return call back to [phone]. Thanks.  
Initial (On Arrival)

## 2025-02-11 ENCOUNTER — OFFICE VISIT (OUTPATIENT)
Dept: ORTHOPEDICS | Facility: CLINIC | Age: 35
End: 2025-02-11
Payer: COMMERCIAL

## 2025-02-11 ENCOUNTER — TELEPHONE (OUTPATIENT)
Dept: ORTHOPEDICS | Facility: CLINIC | Age: 35
End: 2025-02-11
Payer: COMMERCIAL

## 2025-02-11 DIAGNOSIS — M53.3 CHRONIC LEFT SI JOINT PAIN: Primary | ICD-10-CM

## 2025-02-11 DIAGNOSIS — G89.29 CHRONIC LEFT SI JOINT PAIN: Primary | ICD-10-CM

## 2025-02-11 PROCEDURE — 99499 UNLISTED E&M SERVICE: CPT | Mod: 95,,, | Performed by: ORTHOPAEDIC SURGERY

## 2025-02-11 NOTE — TELEPHONE ENCOUNTER
Spoke to patient regarding his ct results. Stated to patient that the virtual is scheduled for 2:15 pm. Patient stated thank you. Thanks.

## 2025-02-11 NOTE — PROGRESS NOTES
DATE: 2/11/2025  PATIENT: Dong Denny    Attending Physician: Troy Bliss M.D.    HISTORY:  Dong Denny is a 34 y.o. male who returns to me today for CT results.  He was last seen by me 11/4/2024.  Today he is doing well but notes he continues to have isolated left sided low back pain. He had a left SIJ injection on 11/22/24 with minimal relief     The Patient denies myelopathic symptoms such as handwriting changes or difficulty with buttons/coins/keys. Denies perineal paresthesias, bowel/bladder dysfunction.      EXAM:  There were no vitals taken for this visit.    My physical examination was notable for the following findings:     Musculoskeletal and neuro exam stable    IMAGING:    Today I personally re- reviewed AP, Lat and Flex/Ex  upright L-spine that demonstrate mild degenerative changes at L5-S1.      MRI lumbar demonstrates mild right sided disc bulging at L3-4 and L5-S1.    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     There is no height or weight on file to calculate BMI.    Hemoglobin A1C   Date Value Ref Range Status   07/26/2024 4.9 4.0 - 5.6 % Final     Comment:     ADA Screening Guidelines:  5.7-6.4%  Consistent with prediabetes  >or=6.5%  Consistent with diabetes    High levels of fetal hemoglobin interfere with the HbA1C  assay. Heterozygous hemoglobin variants (HbS, HgC, etc)do  not significantly interfere with this assay.   However, presence of multiple variants may affect accuracy.     09/22/2023 5.0 4.0 - 5.6 % Final     Comment:     ADA Screening Guidelines:  5.7-6.4%  Consistent with prediabetes  >or=6.5%  Consistent with diabetes    High levels of fetal hemoglobin interfere with the HbA1C  assay. Heterozygous hemoglobin variants (HbS, HgC, etc)do  not significantly interfere with this assay.   However, presence of multiple variants may affect accuracy.     10/04/2022 4.9 4.0 - 5.6 % Final      Comment:     ADA Screening Guidelines:  5.7-6.4%  Consistent with prediabetes  >or=6.5%  Consistent with diabetes    High levels of fetal hemoglobin interfere with the HbA1C  assay. Heterozygous hemoglobin variants (HbS, HgC, etc)do  not significantly interfere with this assay.   However, presence of multiple variants may affect accuracy.           ASSESSMENT/PLAN:    There are no diagnoses linked to this encounter.    Today we discussed at length all of the different treatment options including anti-inflammatories, acetaminophen, rest, ice, heat, physical therapy including strengthening and stretching exercises, home exercises, ROM, aerobic conditioning, aqua therapy, other modalities including ultrasound, massage, and dry needling, epidural steroid injections and finally surgical intervention.      Pt presents with chronic left SIJ pain. Failure of conservative rx. No surgical intervention indicated at this time, will schedule fu with pain mgmt for further injection options. Will check ESR, CRP, and thoracic spine xray    The attending portion of this evaluation, treatment, and documentation was performed per Anna Carlos PA-C via Telemedicine AudioVisual using the secure [employer] ([employer]) software platform with two-way audio/video. The provider was located off-site and the patient is located in the hospital. The aforementioned video software was utilized to document the relevant history and physical exam.

## 2025-02-12 ENCOUNTER — TELEPHONE (OUTPATIENT)
Dept: PAIN MEDICINE | Facility: CLINIC | Age: 35
End: 2025-02-12
Payer: COMMERCIAL

## 2025-02-12 ENCOUNTER — TELEPHONE (OUTPATIENT)
Dept: SPINE | Facility: CLINIC | Age: 35
End: 2025-02-12
Payer: COMMERCIAL

## 2025-02-12 NOTE — TELEPHONE ENCOUNTER
"----- Message from Med Assistant Ina sent at 2/12/2025  3:34 PM CST -----  Good afternoon Dr. Fritz,    I spoke with Mr. Umana and he states he would like to schedule an appt at the [facility] location. Can someone from [facility] assist with scheduling Mr. Umana's appt? Thank you.  ----- Message -----  From: Anna Carlos PA-C  Sent: 2/12/2025   8:20 AM CST  To: Curt Fritz MD; Ina Espinoza MA; #    Awesome!  ----- Message -----  From: Curt Fritz MD  Sent: 2/11/2025   4:26 PM CST  To: Anna Carlos PA-C; Ina Espinoza MA; #    Shwetha Arguello!    Happy to see him in clinic. We can consider peripheral nerve stimulation if we think his pain is really SI pain.    Someone from my team, can we get him scheduled for a follow up visit?  Either [facility] or [facility] (whichever he prefers).    Thanks!    Curt Fritz  ----- Message -----  From: Anna Carlos PA-C  Sent: 2/11/2025   3:44 PM CST  To: Curt Fritz MD    Hey,    This patient has been seeing me for left sided low back pain. I sent him for a left SIJ injection with you with no relief. 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 We dont really have a surgical option for that. I can schedule him in your clinic if that's more convenient!    Kwame,  Saundra  "

## 2025-02-13 ENCOUNTER — PATIENT MESSAGE (OUTPATIENT)
Dept: ORTHOPEDICS | Facility: CLINIC | Age: 35
End: 2025-02-13
Payer: COMMERCIAL

## 2025-02-21 ENCOUNTER — HOSPITAL ENCOUNTER (OUTPATIENT)
Dept: RADIOLOGY | Facility: HOSPITAL | Age: 35
Discharge: HOME | End: 2025-02-21
Attending: ORTHOPAEDIC SURGERY
Payer: COMMERCIAL

## 2025-02-21 DIAGNOSIS — M53.3 CHRONIC LEFT SI JOINT PAIN: ICD-10-CM

## 2025-02-21 DIAGNOSIS — G89.29 CHRONIC LEFT SI JOINT PAIN: ICD-10-CM

## 2025-02-21 PROCEDURE — 72080 X-RAY EXAM THORACOLMB 2/> VW: CPT | Mod: TC

## 2025-02-21 PROCEDURE — 72080 X-RAY EXAM THORACOLMB 2/> VW: CPT | Mod: 26,,, | Performed by: RADIOLOGY

## 2025-02-24 ENCOUNTER — OFFICE VISIT (OUTPATIENT)
Dept: PAIN MEDICINE | Facility: CLINIC | Age: 35
End: 2025-02-24
Payer: COMMERCIAL

## 2025-02-24 VITALS
SYSTOLIC BLOOD PRESSURE: 119 MMHG | HEART RATE: 73 BPM | BODY MASS INDEX: 24.68 KG/M2 | WEIGHT: 167.13 LBS | DIASTOLIC BLOOD PRESSURE: 79 MMHG

## 2025-02-24 DIAGNOSIS — M79.18 MYOFASCIAL PAIN SYNDROME: ICD-10-CM

## 2025-02-24 DIAGNOSIS — M46.1 SACROILIITIS: Primary | ICD-10-CM

## 2025-02-24 DIAGNOSIS — M47.816 LUMBAR SPONDYLOSIS: ICD-10-CM

## 2025-02-24 PROCEDURE — 3078F DIAST BP <80 MM HG: CPT | Mod: CPTII,S$GLB,, | Performed by: STUDENT IN AN ORGANIZED HEALTH CARE EDUCATION/TRAINING PROGRAM

## 2025-02-24 PROCEDURE — G2211 COMPLEX E/M VISIT ADD ON: HCPCS | Mod: S$GLB,,, | Performed by: STUDENT IN AN ORGANIZED HEALTH CARE EDUCATION/TRAINING PROGRAM

## 2025-02-24 PROCEDURE — 99214 OFFICE O/P EST MOD 30 MIN: CPT | Mod: S$GLB,,, | Performed by: STUDENT IN AN ORGANIZED HEALTH CARE EDUCATION/TRAINING PROGRAM

## 2025-02-24 PROCEDURE — 99999 PR PBB SHADOW E&M-EST. PATIENT-LVL III: CPT | Mod: PBBFAC,,, | Performed by: STUDENT IN AN ORGANIZED HEALTH CARE EDUCATION/TRAINING PROGRAM

## 2025-02-24 PROCEDURE — 1159F MED LIST DOCD IN RCRD: CPT | Mod: CPTII,S$GLB,, | Performed by: STUDENT IN AN ORGANIZED HEALTH CARE EDUCATION/TRAINING PROGRAM

## 2025-02-24 PROCEDURE — 1160F RVW MEDS BY RX/DR IN RCRD: CPT | Mod: CPTII,S$GLB,, | Performed by: STUDENT IN AN ORGANIZED HEALTH CARE EDUCATION/TRAINING PROGRAM

## 2025-02-24 PROCEDURE — 3074F SYST BP LT 130 MM HG: CPT | Mod: CPTII,S$GLB,, | Performed by: STUDENT IN AN ORGANIZED HEALTH CARE EDUCATION/TRAINING PROGRAM

## 2025-02-24 PROCEDURE — 3008F BODY MASS INDEX DOCD: CPT | Mod: CPTII,S$GLB,, | Performed by: STUDENT IN AN ORGANIZED HEALTH CARE EDUCATION/TRAINING PROGRAM

## 2025-02-24 NOTE — PROGRESS NOTES
Chronic patient Established Note (Follow up visit)      SUBJECTIVE:    INTERVAL HISTORY 2/24/2025:  Dong Denny presents to the clinic for a follow-up appointment for chronic pain. Current pain intensity is 0/10.  Since the last visit, Dong Denny states:  he reports that he had about 50% relief from his last left SI joint injection.  He feels his pain gets worse with left abduction and running.  He reports he hasn't really run since August    PRIOR HISTORY (2/11/25 - MAGUE Carlos):   Dong Denny is a 34 y.o. male who returns to me today for CT results.  He was last seen by me 11/4/2024.  Today he is doing well but notes he continues to have isolated left sided low back pain. He had a left SIJ injection on 11/22/24 with minimal relief   The Patient denies myelopathic symptoms such as handwriting changes or difficulty with buttons/coins/keys. Denies perineal paresthesias, bowel/bladder dysfunction.      Pain Disability Index Review:      2/24/2025     3:20 PM   Last 3 PDI Scores   Pain Disability Index (PDI) 0       Pain Medications:   - none    Opioid Contract: not applicable     report:  Reviewed and consistent with medication use as prescribed.    Pain Procedures:   11/5/2024 - left SI J - 75% improvement for about 2 weeks    Physical Therapy/Home Exercise: yes, with no significant improvement    Imaging:   CT PELVIS WITHOUT CONTRAST     CLINICAL HISTORY:  Pelvis pain, stress fracture suspected, neg xray;  Pelvic and perineal pain     TECHNIQUE:  CT of the pelvis without intravenous contrast.  Coronal and sagittal reformats were obtained.     COMPARISON:  CT from 10/14/2022     FINDINGS:  Visualized portions of the bowel demonstrate no focal wall thickening.  No evidence of obstruction.  Urinary bladder is normal in appearance.  Prostate is not enlarged.  No evidence of free fluid.  No lymphadenopathy.  Vascular structures demonstrate no calcifications.  No evidence of  hernia.  Osseous structures demonstrate no evidence of acute fracture.  Osseous bridging of the left SI joint and mild degenerative changes of the right SI joint.  No evidence of erosions.  No osseous destructive process.     Impression:     No acute process in the pelvis.  No evidence of fracture.  Further evaluation as clinically indicated.        Electronically signed by:Keith Eckert MD  Date:                                            01/17/2025  Time:                                           08:50    XR THORACOLUMBAR SPINE AP LATERAL     CLINICAL HISTORY:  Sacrococcygeal disorders, not elsewhere classified     TECHNIQUE:  AP and lateral views of the thoracolumbar spine were performed.     COMPARISON:  None     FINDINGS:  Posterior vertebral alignment is satisfactory.  Vertebral body heights are well maintained.  There is no evidence for acute fracture or bone destruction.  There are mild degenerative changes within the thoracolumbar spine with narrowing of the L4-5 and L5-S1 intervertebral disc spaces.  There are calcific densities projecting over the left kidney and left renal calculi are suspected.     Impression:     No evidence for acute fracture, bone destruction, or subluxation.     Mild degenerative changes with mild disc space narrowing at the L4-5 and L5-S1 levels.     Left renal calculi.        Electronically signed by:Chang Rosales MD  Date:                                            02/21/2025  Time:                                           13:12      MRI LUMBAR SPINE WITHOUT CONTRAST     CLINICAL HISTORY:  Low back pain, symptoms persist with > 6wks conservative treatment; Dorsalgia, unspecified     TECHNIQUE:  Multiplanar, multisequence MR images were acquired from the thoracolumbar junction to the sacrum without the administration of contrast.     COMPARISON:  Radiograph 09/22/2023, CT abdomen pelvis 04/05/2024     FINDINGS:  The vertebral bodies demonstrate no evidence of fracture, osseous  destructive process or aggressive bone marrow replacement process.     Normal sagittal alignment is preserved.  No spondylolisthesis.     Degenerative changes individual disc levels further discussed below:     L1-2: Mild disc desiccation subtle loss of disc height disc bulging.  No significant spinal stenosis or neural foraminal narrowing.     L2-3: No significant degenerative disc disease.  Mild facet arthropathy.  No spinal stenosis or neural foraminal narrowing.     L3-4: Mild disc bulging with superimposed right foraminal disc osteophyte complex.  This abuts the exiting right L3 nerve root.  No significant spinal canal stenosis or left-sided neural foraminal narrowing.  Modest facet arthropathy.     L4-5: Mild disc bulging with circumferential endplate marginal osteophyte formation.  No significant spinal stenosis.  Modest neural foraminal narrowing.     L5-S1: Annular fissure with shallow central/right paracentral disc protrusion.  This narrows the right lateral recess abutting the descending S1 nerve root.  No significant spinal canal stenosis or neural foraminal narrowing.     The terminal spinal cord, conus, and cauda equina demonstrate normal caliber, morphology, and signal.     No intraspinal mass or fluid collection.     No acute abnormalities in the visualized paraspinal soft tissue structures.     Impression:     Mild multilevel degenerative change, with individual disc levels further detailed above.        Electronically signed by:Tristan Morales MD  Date:                                            11/01/2024  Time:                                           17:10    Allergies:   Review of patient's allergies indicates:  No Known Allergies    Current Medications:   Current Medications[1]    REVIEW OF SYSTEMS:    GENERAL:  No new weight loss, malaise or fevers.  HEENT:  Negative for new frequent or significant headaches.  NECK:  Negative for new lumps, goiter, and significant neck swelling.  RESPIRATORY:   Negative for new cough, wheezing or shortness of breath.  CARDIOVASCULAR:  Negative for new chest pain, leg swelling or palpitations.  GI:  Negative for new abdominal discomfort, blood in stools or black stools or change in bowel habits.  MUSCULOSKELETAL:  See HPI.  SKIN:  Negative for new lesions, rash, and itching.  PSYCH:  Negative for new sleep disturbance, mood disorder  HEMATOLOGY/LYMPHOLOGY:  Negative for new prolonged bleeding, bruising easily or swollen nodes.  NEURO:   No new headaches, syncope, paralysis, seizures or tremors.  All other reviewed and negative other than HPI.    Past Medical History:  Past Medical History:   Diagnosis Date    Androgenetic alopecia     Chronic rhinosinusitis     Dyslipidemia 2022    History of kidney stones 10/21/2024    Kidney stones     Low testosterone        Past Surgical History:  Past Surgical History:   Procedure Laterality Date    INJECTION, SACROILIAC JOINT Left 11/22/2024    Procedure: LT SI Joint inj;  Surgeon: Curt Fritz MD;  Location: [facility];  Service: Pain Management;  Laterality: Left;  no sed  no AC    TOE SURGERY Left 2015    TONSILLECTOMY      TURBINATE RESECTION Bilateral 2010, 2006    done twice    TYMPANOSTOMY TUBE PLACEMENT Bilateral 1990       Family History:  Family History   Problem Relation Name Age of Onset    Celiac disease Mother      Diabetes Father Camron     No Known Problems Sister      No Known Problems Maternal Grandmother      No Known Problems Maternal Grandfather      No Known Problems Paternal Grandmother      No Known Problems Paternal Grandfather         Social History:  Social History     Socioeconomic History    Marital status:    Occupational History    Occupation:    Tobacco Use    Smoking status: Never    Smokeless tobacco: Never   Substance and Sexual Activity    Alcohol use: Yes     Alcohol/week: 2.0 standard drinks of alcohol     Types: 2 Standard drinks or equivalent per week      Comment: socially    Drug use: No    Sexual activity: Yes     Partners: Female     Birth control/protection: None     Social Drivers of Health     Financial Resource Strain: Low Risk  (2/11/2025)    Overall Financial Resource Strain (CARDIA)     Difficulty of Paying Living Expenses: Not hard at all   Food Insecurity: No Food Insecurity (2/11/2025)    Hunger Vital Sign     Worried About Running Out of Food in the Last Year: Never true     Ran Out of Food in the Last Year: Never true   Transportation Needs: No Transportation Needs (2/11/2025)    PRAPARE - Transportation     Lack of Transportation (Medical): No     Lack of Transportation (Non-Medical): No   Physical Activity: Sufficiently Active (2/11/2025)    Exercise Vital Sign     Days of Exercise per Week: 5 days     Minutes of Exercise per Session: 30 min   Stress: No Stress Concern Present (2/11/2025)    [employer] - Occupational Stress Questionnaire     Feeling of Stress : Only a little   Housing Stability: Low Risk  (2/11/2025)    Housing Stability Vital Sign     Unable to Pay for Housing in the Last Year: No     Homeless in the Last Year: No       OBJECTIVE:    /79 (Patient Position: Sitting)   Pulse 73   Wt 75.8 kg (167 lb 1.7 oz)   BMI 24.68 kg/m²     PHYSICAL EXAMINATION:  General appearance: Well appearing, in no acute distress, alert and appropriately communicative.  Psych:  Mood and affect appropriate.  Skin: Skin color, texture, turgor normal, no rashes or lesions, in both upper and lower body for exposed skin.  Head/face:  Atraumatic, normocephalic.  Cor: regular rate  Pulm: non-labored breathing  GI: Abdomen non-distended and non-tender.  Back: Straight leg raising in the sitting and supine positions is negative to radicular pain. No pain to palpation over the spine or paraspinal muscles. Normal range of motion without pain reproduction.  Extremities: No deformities, significant lymphedema, or skin discoloration.  No significant open wounds. No major amputations.  Musculoskeletal: hip, sacroiliac and knee provocative maneuvers are negative with the exception of left SI tenderness, +left Elaine. Bilateral upper and lower extremity strength is normal and symmetric.  No atrophy or tone abnormalities are noted.  Neuro: Bilateral upper and lower extremity coordination and muscle stretch reflexes abnormalities noted: none.  Arenas and/or Clonus: negative; loss of sensation to light touch: none  Gait: normal    CMP  Sodium   Date Value Ref Range Status   07/26/2024 140 136 - 145 mmol/L Final     Potassium   Date Value Ref Range Status   07/26/2024 4.5 3.5 - 5.1 mmol/L Final     Chloride   Date Value Ref Range Status   07/26/2024 106 95 - 110 mmol/L Final     CO2   Date Value Ref Range Status   07/26/2024 27 23 - 29 mmol/L Final     Glucose   Date Value Ref Range Status   07/26/2024 95 70 - 110 mg/dL Final     BUN   Date Value Ref Range Status   07/26/2024 13 6 - 20 mg/dL Final     Creatinine   Date Value Ref Range Status   07/26/2024 1.0 0.5 - 1.4 mg/dL Final     Calcium   Date Value Ref Range Status   07/26/2024 9.5 8.7 - 10.5 mg/dL Final     Total Protein   Date Value Ref Range Status   07/26/2024 7.0 6.0 - 8.4 g/dL Final     Albumin   Date Value Ref Range Status   07/26/2024 4.4 3.5 - 5.2 g/dL Final   09/25/2017 4.9 3.6 - 5.1 g/dL Final     Comment:     @ Test Performed By:  [facility] Esa Garner M.D., SIMRAN.,   [id number] [place] [id number]  [facility]  [id number]       Total Bilirubin   Date Value Ref Range Status   07/26/2024 0.5 0.1 - 1.0 mg/dL Final     Comment:     For infants and newborns, interpretation of results should be based  on gestational age, weight and in agreement with clinical  observations.    Premature Infant recommended reference ranges:  Up to 24 hours.............<8.0 mg/dL  Up to 48 hours............<12.0 mg/dL  3-5 days..................<15.0  mg/dL  6-29 days.................<15.0 mg/dL       Alkaline Phosphatase   Date Value Ref Range Status   07/26/2024 40 (L) 55 - 135 U/L Final     AST   Date Value Ref Range Status   07/26/2024 15 10 - 40 U/L Final     ALT   Date Value Ref Range Status   07/26/2024 24 10 - 44 U/L Final     Anion Gap   Date Value Ref Range Status   07/26/2024 7 (L) 8 - 16 mmol/L Final     eGFR   Date Value Ref Range Status   07/26/2024 >60.0 >60 mL/min/1.73 m^2 Final     ASSESSMENT: 34 y.o. year old male with chronic pain, consistent with:     1. Sacroiliitis  Ambulatory referral/consult to [facility]      2. Myofascial pain syndrome  Ambulatory referral/consult to [facility]      3. Lumbar spondylosis  Ambulatory referral/consult to [facility]          IMPRESSION: Dong Denny presents today for left lower back pain. History and physical exam are consistent with left sacroiliitis.  Imaging is consistent with bilateral sacroiliac joint degenerative disease.  He had some, but short lived relief from his bilateral SI joint injections.  We will start with conservative management (physical therapy and non-narcotic medications). If their pain persists despite conservative measures, we will consider alternative interventions in an effort to give them additional relief for their pain.    PLAN:   - I have stressed the importance of physical activity and a home exercise plan to help with pain and improve health.  - Patient can continue with medications for now since they are providing benefits, using them appropriately, and without side effects.  - referral to healthy back program for lower back pain  - if his pain persists, we can consider left SI block (diagnostic) with plan for SI arthrodesis, if appropriate  - RTC in 2 - 3 months to discuss interventions, if indicated at that time  - Counseled patient regarding the importance of activity modification and physical therapy.    The above plan and  management options were discussed at length with patient. Patient is in agreement with the above and verbalized understanding.    Curt Fritz  02/24/2025           [1]   Current Outpatient Medications   Medication Sig Dispense Refill    azelastine-fluticasone (DYMISTA) 137-50 mcg/spray Spry nassal spray 1 spray by Each Nostril route 2 (two) times daily. 23 g 11    levocetirizine (XYZAL) 5 MG tablet Take 1 tablet (5 mg total) by mouth every evening. 30 tablet 11    tadalafiL (CIALIS) 5 MG tablet Take 1 tablet (5 mg total) by mouth daily as needed for Erectile Dysfunction. 30 tablet 11     No current facility-administered medications for this visit.

## 2025-03-08 ENCOUNTER — PATIENT MESSAGE (OUTPATIENT)
Dept: ORTHOPEDICS | Facility: CLINIC | Age: 35
End: 2025-03-08
Payer: COMMERCIAL

## 2025-03-13 ENCOUNTER — LAB VISIT (OUTPATIENT)
Dept: LAB | Facility: HOSPITAL | Age: 35
End: 2025-03-13
Attending: ORTHOPAEDIC SURGERY
Payer: COMMERCIAL

## 2025-03-13 ENCOUNTER — PATIENT MESSAGE (OUTPATIENT)
Dept: UROLOGY | Facility: CLINIC | Age: 35
End: 2025-03-13
Payer: COMMERCIAL

## 2025-03-13 DIAGNOSIS — M53.3 CHRONIC LEFT SI JOINT PAIN: ICD-10-CM

## 2025-03-13 DIAGNOSIS — G89.29 CHRONIC LEFT SI JOINT PAIN: ICD-10-CM

## 2025-03-13 PROCEDURE — 86140 C-REACTIVE PROTEIN: CPT | Performed by: ORTHOPAEDIC SURGERY

## 2025-03-13 PROCEDURE — 85652 RBC SED RATE AUTOMATED: CPT | Performed by: ORTHOPAEDIC SURGERY

## 2025-03-14 ENCOUNTER — CLINICAL SUPPORT (OUTPATIENT)
Dept: REHABILITATION | Facility: HOSPITAL | Age: 35
End: 2025-03-14
Attending: STUDENT IN AN ORGANIZED HEALTH CARE EDUCATION/TRAINING PROGRAM
Payer: COMMERCIAL

## 2025-03-14 DIAGNOSIS — M46.1 SACROILIITIS: ICD-10-CM

## 2025-03-14 DIAGNOSIS — Z74.09 DECREASED FUNCTIONAL MOBILITY AND ENDURANCE: Primary | ICD-10-CM

## 2025-03-14 DIAGNOSIS — M47.816 LUMBAR SPONDYLOSIS: ICD-10-CM

## 2025-03-14 DIAGNOSIS — M79.18 MYOFASCIAL PAIN SYNDROME: ICD-10-CM

## 2025-03-14 LAB
CRP SERPL-MCNC: 0.6 MG/L (ref 0–8.2)
ERYTHROCYTE [SEDIMENTATION RATE] IN BLOOD BY PHOTOMETRIC METHOD: 2 MM/HR (ref 0–23)

## 2025-03-14 PROCEDURE — 97161 PT EVAL LOW COMPLEX 20 MIN: CPT | Performed by: PHYSICAL MEDICINE & REHABILITATION

## 2025-03-14 PROCEDURE — 97140 MANUAL THERAPY 1/> REGIONS: CPT | Performed by: PHYSICAL MEDICINE & REHABILITATION

## 2025-03-14 RX ORDER — AZELASTINE HYDROCHLORIDE, FLUTICASONE PROPIONATE 137; 50 UG/1; UG/1
1 SPRAY, METERED NASAL 2 TIMES DAILY
Qty: 23 G | Refills: 11 | Status: SHIPPED | OUTPATIENT
Start: 2025-03-14 | End: 2026-03-14

## 2025-03-14 NOTE — PATIENT INSTRUCTIONS
List:  Roll out left SI  in right side lie, left leg on foam roller  Hip external rotation stretch  Double leg bridge and single leg bridge  Clams  Pull down hip march

## 2025-03-14 NOTE — PROGRESS NOTES
Outpatient Rehab    Physical Therapy Evaluation    Patient Name: Dong Denny  MRN: [id number]  YOB: 1990  Encounter Date: 3/14/2025    Therapy Diagnosis:   Encounter Diagnoses   Name Primary?    Sacroiliitis     Myofascial pain syndrome     Lumbar spondylosis     Decreased functional mobility and endurance Yes     Physician: Curt Fritz MD    Physician Orders: Eval and Treat  Medical Diagnosis:   Diagnosis   M46.1 (ICD-10-CM) - Sacroiliitis   M79.18 (ICD-10-CM) - Myofascial pain syndrome   M47.816 (ICD-10-CM) - Lumbar spondylosis       Visit # / Visits Authorized:  1 / 10   Date of Evaluation:  3/14/2025   Insurance Authorization Period: 3/14/25 to 5/14/25  Plan of Care Certification:  3/14/2025 to 6/14/25      Time In:   2:30  Time Out:  3:30  Total Time:   60  Total Billable Time: 60    Intake Outcome Measure for FOTO Survey  (FOTO Sent to patient by email)      Therapist reviewed FOTO scores for Dong Denny on 3/14/2025.   FOTO report - see Media section or FOTO account episode details.     Intake Score:  %  sent to patient         Subjective   History of Present Illness  Dong is a 34 y.o. male who reports to physical therapy with a chief concern of left SI pain.     The patient reports a medical diagnosis of M46.1 (ICD-10-CM) - Sacroiliitis  M79.18 (ICD-10-CM) - Myofascial pain syndrome  M47.816 (ICD-10-CM) - Lumbar spondylosis.            History of Present Condition/Illness: History of present illness:  Problems started summer 2023.  Increased running mileage from 5 miles/week to 20 miles/week and onto treadmill.  Started with specific left sided back pain with running only, consistently at 1 mile.  Saw Physician.  Had PT for hip/SI/back.  Nothing really helped.  SI injection.  Stopped running.   Pain location(s): left sided SI specific pain which is on with running and off as soon as he stops even if he is still standing.  Walking also brings it on specifically if  walking dog.  He alternates hands with leash.   descriptions: sharp, intermittent, with left stance phase     Worst   7/10.   Best  0 /10.   Current  0 /10  Patterns of Pain:  Worst Pain Location  left SI   Constant or intermittent   intermittent   Worse with bending  no  Bowel /Bladder issues:   no Functional changes:   stopped running and playing volley ball    Worse with:  running, sometimes rolling, walking dog when he pulls  Better with:   rest or stopping     Occupation:   , standing desk.     Leisure:   walk dog, run, video games   Exercise:   going to gym in [employer] 4 days/week, using machines  Gym:   [employer]   Sleep:  no   Previous treatment:   PT, SI injection  Patient goals:   run 5 km    Review of Systems  Additional Red Flag Details: Neg        Past Medical History/Physical Systems Review:   Dong Denny  has a past medical history of Androgenetic alopecia, Chronic rhinosinusitis, Dyslipidemia, History of kidney stones, Kidney stones, and Low testosterone.    Dong Denny  has a past surgical history that includes Tonsillectomy; Turbinate resection (Bilateral, 2010, 2006); Tympanostomy tube placement (Bilateral, 1990); Toe Surgery (Left, 2015); and injection, sacroiliac joint (Left, 11/22/2024).    Dong has a current medication list which includes the following prescription(s): azelastine-fluticasone, levocetirizine, and tadalafil.    Review of patient's allergies indicates:  No Known Allergies     Objective   Posture                 Posture seated: fair    Posture standing: fair    Correction with lumbar roll effect: uses lumbar roll    Right Lower Extremity Reflexes  Patellar, L4: Normal (2+)    Hamstring, L5: Normal (2+)    Achilles, S1: Normal (2+)    Babinski reflex Absent.    Left Lower Extremity Reflexes  Patellar, L4: Normal (2+)     Hamstring, L5: Normal (2+)    Achilles, S1: Normal (2+)    Babinski reflex Absent.        Lumbar Range of Motion   MOVEMENT  LOSS - LUMBAR FLEXION   Norms ROM Loss Initial Flexion Fingers touch toes, sacral angle >/= 70 deg, uniform spinal curvature, posterior weight shift  within functional limits LUMBAR EXTENSION  Norms  ASIS surpasses toes, spine of scapulae surpasses heels, uniform spinal curve within functional limits SIDE GLIDE RIGHT   within functional limits SIDE GLIDE LEFT   within functional limits ROTATION Right PT observes contralateral shoulder within functional limits ROTATION LEFT  PT observes contralateral shoulder within functional limits   Repeated movements no effect      Hip Range of Motion    Functional.  Slightly reduced left hip ext rotation        Lumbar/Pelvic Girdle Special Tests  Lumbar Tests - Repeated  Negative: Flexion and Extension  no effect with repeated movements    Lumbar Tests - SLR and Tension  Negative: Right Passive Straight Leg Raise, Left Passive Straight Leg Raise, Right Crossed Straight Leg Raise, and Left Crossed Straight Leg Raise            Negative prone positive.    Neg SI cluster tests (compression, distraction, Gaenslen's)   Positive flexion SI reduced movement on left.  Positive left flicker SI test.             Treatment:  Therapeutic Exercise  Therapeutic Exercise Activity 1: double leg bridge 10  Therapeutic Exercise Activity 2: single leg bridge 10  Therapeutic Exercise Activity 3: pull down hip march 10 1 plate bilat  Therapeutic Exercise Activity 4: foam roller self mobilization    SI tests indicate reduced mobility left SI  Manual therapy performed left SI for mobility with improved mobility and no adverse effects      Assessment & Plan   Assessment  Dong presents with a condition of Low complexity.                Patient Goal for Therapy (PT): Run  Assessment Details:  Patient presents with specific and localized left sided SI pain only produced with running consistently.  Occasionally with walking.   No pain with back movement or repeated movements.  Some back weakness noted and  we will test on MEDX next visit to compare to normative data.  Pain with resisted left hip abduction.  Neg SI stress tests, but reduced left SI mobility noted and good response to mobilization.  Plan to test back and refer to ortho for specific SI and hip therapy.    Plan  From a physical therapy perspective, the patient would benefit from: Skilled Rehab Services    Planned therapy interventions include: Therapeutic exercise, Therapeutic activities, Neuromuscular re-education, Manual therapy, and Orthotic management and training.            Visit Frequency: 2 times Per Week for 6 Weeks.       This plan was discussed with Patient.   Plan details: Refer to ortho for sport specific therapy SI care in addition to medx healthy back care          Patient's spiritual, cultural, and educational needs considered and patient agreeable to plan of care and goals.           Goals:   Active       Long Term Goals       Tolerate running 2-3 miles       Start:  03/14/25    Expected End:  05/14/25            Full strength LE without pain, SI tests reveal good mobility, full function with leisure and volley ball and walking dog       Start:  03/14/25    Expected End:  05/14/25               Self reported goals       run 5 km       Start:  03/14/25    Expected End:  05/14/25            Play volley ball       Start:  03/14/25    Expected End:  05/14/25               Short Term Goals       Tolerate walking consistently without issues       Start:  03/14/25    Expected End:  04/14/25            Good mobility SI with good strength LE and indep with HEP        Start:  03/14/25    Expected End:  04/14/25              Plan:  Assess back strength and refer to ortho for SI specific ortho care.  Co treat with medx strengthening as indicated.    Ragini Briceño, PT

## 2025-03-17 ENCOUNTER — CLINICAL SUPPORT (OUTPATIENT)
Dept: REHABILITATION | Facility: HOSPITAL | Age: 35
End: 2025-03-17
Attending: STUDENT IN AN ORGANIZED HEALTH CARE EDUCATION/TRAINING PROGRAM
Payer: COMMERCIAL

## 2025-03-17 DIAGNOSIS — R29.898 DECREASED STRENGTH OF TRUNK AND BACK: ICD-10-CM

## 2025-03-17 DIAGNOSIS — Z74.09 DECREASED FUNCTIONAL MOBILITY AND ENDURANCE: Primary | ICD-10-CM

## 2025-03-17 PROCEDURE — 97112 NEUROMUSCULAR REEDUCATION: CPT

## 2025-03-17 PROCEDURE — 97110 THERAPEUTIC EXERCISES: CPT

## 2025-03-17 PROCEDURE — 97140 MANUAL THERAPY 1/> REGIONS: CPT

## 2025-03-17 NOTE — PROGRESS NOTES
"  Outpatient Rehab    Physical Therapy Visit    Patient Name: Dong Denny  MRN: [id number]  YOB: 1990  Encounter Date: 3/17/2025    Therapy Diagnosis:   Encounter Diagnoses   Name Primary?    Decreased functional mobility and endurance Yes    Decreased strength of trunk and back      Physician: Curt Fritz MD    Physician Orders: Eval and Treat  Medical Diagnosis: Sacroiliitis  Myofascial pain syndrome  Lumbar spondylosis    Visit # / Visits Authorized:  1 / 10  Date of Evaluation: 3/14/25  Insurance Authorization Period: 3/7/2025 to 12/31/2025  Plan of Care Certification:  3/14/25 to 5/14/25      PT/PTA:     Number of PTA visits since last PT visit:   Time In: 1300   Time Out: 1400  Total Time: 60   Total Billable Time: 57         Subjective   some pain after last session, right in left SI joint, no pain at start of session..  Pain reported as 0/10.      Objective            Treatment:  Therapeutic Exercise  TE 1: piriformis stretch 20" x 2  TE 2: half knee hip flexor stretch 20" x 2  TE 3: double leg bridges x 20  TE 4: single leg bridge x 20  TE 5: clams x 20  Manual Therapy  MT 1: left psoas / iliacus release (pin and roll)  MT 2: left prone SI distraction  Balance/Neuromuscular Re-Education  NMR 1: Lumbar MedX testing    Lumbar Isometric Testing on Med X equipment: Testing administered by PT    Test Initial Midpoint Final   Date 3/17/25     ROM 0-54 deg     Max Peak Torque 82      Min Peak Torque 28      Flex/Ext Ratio 2.9:1     % below normative data 87% deficit     % gain from initial test Not available visit 1           3/17/2025     1:06 PM   [facility]   Visit Number 2   VAS Pain Rating 0         Time Entry(in minutes):  Manual Therapy Time Entry: 15  Neuromuscular Re-Education Time Entry: 15  Therapeutic Exercise Time Entry: 27    Assessment & Plan   Assessment: Pt tolerated session fairly well.  Decreased mobility of left SI noted, some improvement with prone " distraction but not as mobile as right side.  Significant soft tissue tightness noted in left psoas and iliacs which was addressed with trigger point pin and roll release.  Lumbar MedX testing performed today and patient demonstrates 87% strength defitics as compared to men the same age and would benefit from continued strength training on Medx machine.       Patient will continue to benefit from skilled outpatient physical therapy to address the deficits listed in the problem list box on initial evaluation, provide pt/family education and to maximize pt's level of independence in the home and community environment.     Patient's spiritual, cultural, and educational needs considered and patient agreeable to plan of care and goals.           Plan: Continue present POC    Goals:   Active       Long Term Goals       Tolerate running 2-3 miles       Start:  03/14/25    Expected End:  05/14/25            Full strength LE without pain, SI tests reveal good mobility, full function with leisure and volley ball and walking dog       Start:  03/14/25    Expected End:  05/14/25               Self reported goals       run 5 km       Start:  03/14/25    Expected End:  05/14/25            Play volley ball       Start:  03/14/25    Expected End:  05/14/25               Short Term Goals       Tolerate walking consistently without issues       Start:  03/14/25    Expected End:  04/14/25            Good mobility SI with good strength LE and indep with HEP        Start:  03/14/25    Expected End:  04/14/25                Cheryl Bob, PT

## 2025-03-19 RX ORDER — TADALAFIL 5 MG/1
5 TABLET ORAL DAILY
Qty: 30 TABLET | Refills: 5 | Status: SHIPPED | OUTPATIENT
Start: 2025-03-19 | End: 2025-03-20

## 2025-03-20 RX ORDER — TADALAFIL 5 MG/1
5 TABLET ORAL DAILY
Qty: 30 TABLET | Refills: 5 | Status: SHIPPED | OUTPATIENT
Start: 2025-03-20 | End: 2025-09-16

## 2025-03-21 ENCOUNTER — CLINICAL SUPPORT (OUTPATIENT)
Dept: REHABILITATION | Facility: HOSPITAL | Age: 35
End: 2025-03-21
Attending: STUDENT IN AN ORGANIZED HEALTH CARE EDUCATION/TRAINING PROGRAM
Payer: COMMERCIAL

## 2025-03-21 DIAGNOSIS — Z74.09 DECREASED FUNCTIONAL MOBILITY AND ENDURANCE: Primary | ICD-10-CM

## 2025-03-21 PROCEDURE — 97140 MANUAL THERAPY 1/> REGIONS: CPT

## 2025-03-21 PROCEDURE — 97112 NEUROMUSCULAR REEDUCATION: CPT

## 2025-03-21 NOTE — PROGRESS NOTES
"    Outpatient Rehab    Physical Therapy Visit    Patient Name: Dong Denny  MRN: [id number]  YOB: 1990  Encounter Date: 3/21/2025    Therapy Diagnosis:   Encounter Diagnosis   Name Primary?    Decreased functional mobility and endurance Yes     Physician: Curt Fritz MD    Physician Orders: Eval and Treat  Medical Diagnosis: Sacroiliitis  Myofascial pain syndrome  Lumbar spondylosis    Visit # / Visits Authorized:  2 / 10  Date of Evaluation:  3/14/2025  Insurance Authorization Period: 3/7/2025 to 12/31/2025  Plan of Care Certification:  3/14/2025 to 5/14/2025     PT/PTA:     Number of PTA visits since last PT visit:   Time In: 0805   Time Out: 0900  Total Time: 55   Total Billable Time:  55    FOTO:  Intake Score:  %  Survey Score 1:  %  Survey Score 2:  %         Subjective   has felt a little bit better since previous session. Feels like his left SI joint is moving better. Only feels pain when he starts running..  Pain reported as 0/10.      Objective            Treatment:  Manual Therapy  MT 1: Left SI joint manipulation, grade V (pt agreeable to manipulation)  MT 2: Left hip long axis distraction, grade V (pt agreeable to manipulation)  Balance/Neuromuscular Re-Education  NMR 1: Single leg bridge holds, 5 x 15" holds on each side  NMR 2: Medical medx machine at 50 ft lbs, 18 reps, exertion 4/10 for isolated spinal engagement  NMR 3: Side planks with modified knees bent, 8 x 10 sec holds  NMR 4: Prone hip extension with knees bent, 3 x 8  NMR 5: Sidelying hip ER with 2#, 3 x 12    Time Entry(in minutes):  Manual Therapy Time Entry: 9  Neuromuscular Re-Education Time Entry: 46    Assessment & Plan   Assessment: Pt presents with mild decrease in Left sided flick test and Left hip IR mobility as well as pain with sidelying hip abduction strength testing. Manual interventions incorporated to address this. He was able to then work on MedX and posterior/lateral hip strengthening to address " deficits in muscle power and lumbar musculature engagement/activation. Patient to continue to progress as tolerated.  Evaluation/Treatment Tolerance: Patient tolerated treatment well      Patient will continue to benefit from skilled outpatient physical therapy to address the deficits listed in the problem list box on initial evaluation, provide pt/family education and to maximize pt's level of independence in the home and community environment.     Patient's spiritual, cultural, and educational needs considered and patient agreeable to plan of care and goals.           Plan:      Goals:   Active       Long Term Goals       Tolerate running 2-3 miles (Progressing)       Start:  03/14/25    Expected End:  05/14/25            Full strength LE without pain, SI tests reveal good mobility, full function with leisure and volley ball and walking dog (Progressing)       Start:  03/14/25    Expected End:  05/14/25               Self reported goals       run 5 km (Progressing)       Start:  03/14/25    Expected End:  05/14/25            Play volley ball (Progressing)       Start:  03/14/25    Expected End:  05/14/25               Short Term Goals       Tolerate walking consistently without issues (Progressing)       Start:  03/14/25    Expected End:  04/14/25            Good mobility SI with good strength LE and indep with HEP  (Progressing)       Start:  03/14/25    Expected End:  04/14/25                Siva Dixon PT

## 2025-03-28 ENCOUNTER — CLINICAL SUPPORT (OUTPATIENT)
Dept: INTERNAL MEDICINE | Facility: CLINIC | Age: 35
End: 2025-03-28
Payer: COMMERCIAL

## 2025-03-28 ENCOUNTER — OFFICE VISIT (OUTPATIENT)
Dept: INTERNAL MEDICINE | Facility: CLINIC | Age: 35
End: 2025-03-28
Payer: COMMERCIAL

## 2025-03-28 ENCOUNTER — RESULTS FOLLOW-UP (OUTPATIENT)
Dept: INTERNAL MEDICINE | Facility: CLINIC | Age: 35
End: 2025-03-28

## 2025-03-28 ENCOUNTER — HOSPITAL ENCOUNTER (OUTPATIENT)
Dept: CARDIOLOGY | Facility: CLINIC | Age: 35
Discharge: HOME | End: 2025-03-28
Payer: COMMERCIAL

## 2025-03-28 VITALS
HEART RATE: 71 BPM | BODY MASS INDEX: 23.85 KG/M2 | HEIGHT: 69 IN | SYSTOLIC BLOOD PRESSURE: 118 MMHG | DIASTOLIC BLOOD PRESSURE: 78 MMHG | WEIGHT: 161 LBS | OXYGEN SATURATION: 99 %

## 2025-03-28 DIAGNOSIS — Z00.00 ROUTINE MEDICAL EXAM: ICD-10-CM

## 2025-03-28 DIAGNOSIS — Z00.00 ENCOUNTER FOR SCREENING AND PREVENTATIVE CARE: Primary | ICD-10-CM

## 2025-03-28 DIAGNOSIS — Z87.442 HISTORY OF KIDNEY STONES: ICD-10-CM

## 2025-03-28 DIAGNOSIS — Z00.00 ANNUAL PHYSICAL EXAM: Primary | ICD-10-CM

## 2025-03-28 PROBLEM — E78.1 HYPERTRIGLYCERIDEMIA: Status: RESOLVED | Noted: 2023-09-25 | Resolved: 2025-03-28

## 2025-03-28 LAB
ALBUMIN SERPL BCP-MCNC: 4.4 G/DL (ref 3.5–5.2)
ALP SERPL-CCNC: 44 UNIT/L (ref 40–150)
ALT SERPL W/O P-5'-P-CCNC: 27 UNIT/L (ref 10–44)
ANION GAP (OHS): 9 MMOL/L (ref 8–16)
AST SERPL-CCNC: 14 UNIT/L (ref 11–45)
BILIRUB SERPL-MCNC: 0.3 MG/DL (ref 0.1–1)
BUN SERPL-MCNC: 15 MG/DL (ref 6–20)
CALCIUM SERPL-MCNC: 9.4 MG/DL (ref 8.7–10.5)
CHLORIDE SERPL-SCNC: 110 MMOL/L (ref 95–110)
CHOLEST SERPL-MCNC: 152 MG/DL (ref 120–199)
CHOLEST/HDLC SERPL: 4.8 {RATIO} (ref 2–5)
CO2 SERPL-SCNC: 24 MMOL/L (ref 23–29)
CREAT SERPL-MCNC: 0.8 MG/DL (ref 0.5–1.4)
EAG (OHS): 94 MG/DL (ref 68–131)
ERYTHROCYTE [DISTWIDTH] IN BLOOD BY AUTOMATED COUNT: 12 % (ref 11.5–14.5)
GFR SERPLBLD CREATININE-BSD FMLA CKD-EPI: >60 ML/MIN/1.73/M2
GLUCOSE SERPL-MCNC: 105 MG/DL (ref 70–110)
HBA1C MFR BLD: 4.9 % (ref 4–5.6)
HCT VFR BLD AUTO: 44.7 % (ref 40–54)
HDLC SERPL-MCNC: 32 MG/DL (ref 40–75)
HDLC SERPL: 21.1 % (ref 20–50)
HGB BLD-MCNC: 14.9 GM/DL (ref 14–18)
LDLC SERPL CALC-MCNC: 106.2 MG/DL (ref 63–159)
MCH RBC QN AUTO: 29.9 PG (ref 27–50)
MCHC RBC AUTO-ENTMCNC: 33.3 G/DL (ref 32–36)
MCV RBC AUTO: 90 FL (ref 82–98)
NONHDLC SERPL-MCNC: 120 MG/DL
OHS QRS DURATION: 94 MS
OHS QTC CALCULATION: 396 MS
PLATELET # BLD AUTO: 291 K/UL (ref 150–450)
PMV BLD AUTO: 9.5 FL (ref 9.2–12.9)
POTASSIUM SERPL-SCNC: 5.1 MMOL/L (ref 3.5–5.1)
PROT SERPL-MCNC: 7.1 GM/DL (ref 6–8.4)
RBC # BLD AUTO: 4.98 M/UL (ref 4.6–6.2)
SODIUM SERPL-SCNC: 143 MMOL/L (ref 136–145)
TRIGL SERPL-MCNC: 69 MG/DL (ref 30–150)
TSH SERPL-ACNC: 1.37 UIU/ML (ref 0.4–4)
WBC # BLD AUTO: 3.78 K/UL (ref 3.9–12.7)

## 2025-03-28 PROCEDURE — 3074F SYST BP LT 130 MM HG: CPT | Mod: CPTII,S$GLB,, | Performed by: EMERGENCY MEDICINE

## 2025-03-28 PROCEDURE — 93010 ELECTROCARDIOGRAM REPORT: CPT | Mod: S$GLB,,, | Performed by: INTERNAL MEDICINE

## 2025-03-28 PROCEDURE — 99999 PR PBB SHADOW E&M-EST. PATIENT-LVL I: CPT | Mod: PBBFAC,,,

## 2025-03-28 PROCEDURE — 3008F BODY MASS INDEX DOCD: CPT | Mod: CPTII,S$GLB,, | Performed by: EMERGENCY MEDICINE

## 2025-03-28 PROCEDURE — 3044F HG A1C LEVEL LT 7.0%: CPT | Mod: CPTII,S$GLB,, | Performed by: EMERGENCY MEDICINE

## 2025-03-28 PROCEDURE — 83036 HEMOGLOBIN GLYCOSYLATED A1C: CPT

## 2025-03-28 PROCEDURE — 99999 PR PBB SHADOW E&M-EST. PATIENT-LVL III: CPT | Mod: PBBFAC,,, | Performed by: EMERGENCY MEDICINE

## 2025-03-28 PROCEDURE — 36415 COLL VENOUS BLD VENIPUNCTURE: CPT

## 2025-03-28 PROCEDURE — 3078F DIAST BP <80 MM HG: CPT | Mod: CPTII,S$GLB,, | Performed by: EMERGENCY MEDICINE

## 2025-03-28 PROCEDURE — 84443 ASSAY THYROID STIM HORMONE: CPT

## 2025-03-28 PROCEDURE — 93005 ELECTROCARDIOGRAM TRACING: CPT | Mod: S$GLB,,, | Performed by: EMERGENCY MEDICINE

## 2025-03-28 PROCEDURE — 80061 LIPID PANEL: CPT

## 2025-03-28 PROCEDURE — 85027 COMPLETE CBC AUTOMATED: CPT

## 2025-03-28 PROCEDURE — 99395 PREV VISIT EST AGE 18-39: CPT | Mod: S$GLB,,, | Performed by: EMERGENCY MEDICINE

## 2025-03-28 PROCEDURE — 82040 ASSAY OF SERUM ALBUMIN: CPT

## 2025-03-28 RX ORDER — SOFPIRONIUM BROMIDE 87 MG/.67ML
1 GEL TOPICAL DAILY
COMMUNITY
Start: 2025-03-18

## 2025-03-28 NOTE — PROGRESS NOTES
[facility]      TODAY'S Date 3/28/2025  Patient ID: Dong Denny is a 34 y.o. male   MRN: [id number]  Primary Care Physician (PCP):  Anna Mendoza MD    SUBJECTIVE       Chief Complaint:   Chief Complaint   Patient presents with    [facility]     Still has SI problems, 2 stints of 6 wk PT, injected dec 2024      HISTORY OF PRESENT ILLNESS (HPI):   Reviewed medical, surgical, social and family history, medications, appropriate preventive health screenings, as well as vaccination history. Updates as noted below or in assessment and plan.    This is a very pleasant 34 y.o.  male with PMHx bones, androgenetic alopecia, chronic rhino sinusitis, hypotestosteronism. He is presenting for his usual annual exam in [facility] and is currently in good health, with the exception of SI joint pain affecting running ability    MUSCULOSKELETAL - SI JOINT PAIN:  He reports SI joint pain that began in summer 2023, attributed to treadmill running. Pain occurs immediately upon taking 1-3 steps while running. Walking is generally painless except when his dogs pull while on leash, particularly with his 65-pound cuevas. X-ray showed minor bone spurs in the right SI joint. He is currently participating in a healthy back physical therapy program. He received a diagnostic injection in December but was unable to assess its effectiveness due to being on vacation. He denies pain in the contralateral leg or other joints.    EXERCISE AND DIET:  He exercises at the gym 4 days per week. He tracks food intake, including calories and macronutrients, and practices meal preparation.    MEDICAL HISTORY:  He has kidney stones (7 stones present in last year's scans) under annual urologist monitoring. He uses prescription antiperspirant started last month with reported effectiveness for excessive sweating. He maintains regular preventive care including biannual dental check-ups,  annual dermatology exams, and yearly eye evaluations.    CHRONIC FATIGUE:  He reports 10-year history of chronic fatigue, describing persistent low energy and tiredness. Previously used testosterone replacement therapy (TRT) for 2 years after initial testosterone levels were in 300s. TRT was obtained through third-party provider as levels were not deemed low enough for treatment by PCP. He discontinued TRT when trying to conceive with wife and reports no difference in energy levels after discontinuation.    SLEEP:  He reports adequate sleep, typically 8+ hours nightly, with sleep scores in mid to high 80s per wearable device.    SOCIAL HISTORY:  He consumes 2 cups of coffee at home and 4-5 cups at work, typically stopping after lunch. He drinks alcohol approximately once monthly with occasional social drinking. Water is his primary beverage, having discontinued sodas at age 18.      ROS:  General: -fever, -chills, +fatigue, -weight gain, -weight loss  Eyes: -vision changes, -redness, -discharge  ENT: -ear pain, -nasal congestion, -sore throat  Cardiovascular: -chest pain, -palpitations, -lower extremity edema  Respiratory: -cough, -shortness of breath  Gastrointestinal: -abdominal pain, -nausea, -vomiting, -diarrhea, -constipation, -blood in stool  Genitourinary: -dysuria, -hematuria, -frequency  Musculoskeletal: +joint pain, -muscle pain, +pain with movement  Skin: -rash, -lesion, +excessive sweating  Neurological: -headache, -dizziness, -numbness, -tingling  Psychiatric: -anxiety, -depression, -sleep difficulty, +increased stressors       A review of medical records indicates patient has seen the following specialists:   Pain Medicine - Curt Fritz MD  Orthopedic Surgery - Anna Carlos, PA-C  Urology - Rickey Zee MD or Alisa Quinn FNP   Allergy - Trent Romero MD  Otolaryngology - Salome Mckenna MD      Immunization History   Administered Date(s) Administered    COVID-19 Vaccine  "12/29/2020, 01/25/2021    COVID-19, MRNA, LN-S, PF (MODERNA FULL 0.5 ML DOSE) 12/29/2020, 01/25/2021    COVID-19, MRNA, LN-S, PF (Pfizer) (Purple Cap) 12/06/2021    Hepatitis B 10/16/1998, 11/16/1998, 03/05/1999    Influenza - Quadrivalent - PF *Preferred* (6 months and older) 10/04/2022, 09/22/2023    Tdap 09/19/2016     Past Medical History:   Diagnosis Date    Androgenetic alopecia     Chronic rhinosinusitis     Dyslipidemia 2022    History of kidney stones 10/21/2024    Kidney stones     Low testosterone      Past Surgical History:   Procedure Laterality Date    INJECTION, SACROILIAC JOINT Left 11/22/2024    Procedure: LT SI Joint inj;  Surgeon: Curt Fritz MD;  Location: [facility];  Service: Pain Management;  Laterality: Left;  no sed  no AC    TOE SURGERY Left 2015    TONSILLECTOMY      TURBINATE RESECTION Bilateral 2010, 2006    done twice    TYMPANOSTOMY TUBE PLACEMENT Bilateral 1990     Family History   Problem Relation Name Age of Onset    Celiac disease Mother      Diabetes Father Camron     No Known Problems Sister      No Known Problems Maternal Grandmother      No Known Problems Maternal Grandfather      No Known Problems Paternal Grandmother      No Known Problems Paternal Grandfather       Social History[1]  Past Medical, Surgical and Social history reviewed and verified by me.     Review of patient's allergies indicates:  No Known Allergies  Medications Ordered Prior to Encounter[2]    OBJECTIVE     PHYSICAL EXAM  Vitals:    03/28/25 0933   BP: 118/78   BP Location: Left arm   Patient Position: Sitting   Pulse: 71   SpO2: 99%   Weight: 73 kg (161 lb)   Height: 5' 9" (1.753 m)     Vital Signs (Most Recent):  Pulse: 71 (03/28/25 0933)  BP: 118/78 (03/28/25 0933)  SpO2: 99 % (03/28/25 0933)   Weight:   Wt Readings from Last 1 Encounters:   03/28/25 73 kg (161 lb)     Body mass index is 23.78 kg/m².        Vital signs and nursing assessment noted: relatively normal vitals    GEN:   NAD, A & " Ox3, atraumatic, well appearing, nontoxic appearing  HEENT:  PERRLA, EOMI, moist membranes, nl conjunctiva, no scleral icterus, no nystagmus, no nodes/nodules, soft, supple, FROM, no trachial deviation, nexus negative, normal TMs bilaterally, normal pharynx  CV:   RRR no m/r/g, 2+ radial pulses, <2sec cap refill, no obvious JVD  RESP:  CTA B, no w/r/r, equal and bilateral chest rise, no respiratory distress  ABD:   soft, Nontender, Nondistended, +BS, no guarding/rebound  :   Deferred  BACK:  FROM, no midline tenderness, no paraspinal tenderness  EXT:   FROM, JEFF x 4, no swelling, no edema, no calf tenderness, no bony tenderness, no warmth or redness, no crepitus, no obvious deformity  LYMPH:  no gross adenopathy  NEURO:  GCS 15, CN II-XII grossly intact, no obvious motor/sensory deficit, no tremor, negative Romberg,  nl gait/coordination  PSYCH:   Cooperative, no SI/HI, no anxiety, nl mood/affect, nl judgement/thought process  SKIN:  no rashes/lesions or masses, nl color, no pallor, warm, dry, intact      Tests    EKG  Results for orders placed or performed during the hospital encounter of 03/28/25   EKG 12-lead    Collection Time: 03/28/25  8:50 AM   Result Value Ref Range    QRS Duration 94 ms    [facility] QTC Calculation 396 ms    Narrative    Test Reason : Z00.00,    Vent. Rate :  64 BPM     Atrial Rate :  64 BPM     P-R Int : 186 ms          QRS Dur :  94 ms      QT Int : 384 ms       P-R-T Axes :  22  70  43 degrees    QTcB Int : 396 ms    Normal sinus rhythm  Normal ECG  When compared with ECG of 26-Jul-2024 08:42,  No significant change was found  Confirmed by Lyndon Au ([id number]) on 3/28/2025 9:05:11 AM    Referred By: LEÓN NOONAN           Confirmed By: Lyndon Au      Labs reviewed and independently interpreted. Imaging studies reviewed.   Recent Results (from the past 12 weeks)   C-Reactive Protein    Collection Time: 03/13/25  4:22 PM   Result Value Ref Range    CRP 0.6 0.0 - 8.2 mg/L   Sedimentation  rate    Collection Time: 03/13/25  4:22 PM   Result Value Ref Range    Sed Rate 2 0 - 23 mm/Hr   Comprehensive metabolic panel    Collection Time: 03/28/25  8:32 AM   Result Value Ref Range    Sodium 143 136 - 145 mmol/L    Potassium 5.1 3.5 - 5.1 mmol/L    Chloride 110 95 - 110 mmol/L    CO2 24 23 - 29 mmol/L    Glucose 105 70 - 110 mg/dL    BUN 15 6 - 20 mg/dL    Creatinine 0.8 0.5 - 1.4 mg/dL    Calcium 9.4 8.7 - 10.5 mg/dL    Protein Total 7.1 6.0 - 8.4 gm/dL    Albumin 4.4 3.5 - 5.2 g/dL    Bilirubin Total 0.3 0.1 - 1.0 mg/dL    ALP 44 40 - 150 unit/L    AST 14 11 - 45 unit/L    ALT 27 10 - 44 unit/L    Anion Gap 9 8 - 16 mmol/L    eGFR >60 >60 mL/min/1.73/m2   CBC Without Differential    Collection Time: 03/28/25  8:32 AM   Result Value Ref Range    WBC 3.78 (L) 3.90 - 12.70 K/uL    RBC 4.98 4.60 - 6.20 M/uL    HGB 14.9 14.0 - 18.0 gm/dL    HCT 44.7 40.0 - 54.0 %    MCV 90 82 - 98 fL    MCHC 33.3 32.0 - 36.0 g/dL    RDW 12.0 11.5 - 14.5 %    Platelet Count 291 150 - 450 K/uL    MCH 29.9 27.0 - 50.0 pg    MPV 9.5 9.2 - 12.9 fL   Lipid panel    Collection Time: 03/28/25  8:32 AM   Result Value Ref Range    Cholesterol Total 152 120 - 199 mg/dL    Triglyceride 69 30 - 150 mg/dL    HDL Cholesterol 32 (L) 40 - 75 mg/dL    LDL Cholesterol 106.2 63.0 - 159.0 mg/dL    HDL/Cholesterol Ratio 21.1 20.0 - 50.0 %    Cholesterol/HDL Ratio 4.8 2.0 - 5.0    Non HDL Cholesterol 120 mg/dL   Hemoglobin A1c    Collection Time: 03/28/25  8:32 AM   Result Value Ref Range    Hemoglobin A1c 4.9 4.0 - 5.6 %    Estimated Average Glucose 94 68 - 131 mg/dL   TSH    Collection Time: 03/28/25  8:32 AM   Result Value Ref Range    TSH 1.372 0.400 - 4.000 uIU/mL   EKG 12-lead    Collection Time: 03/28/25  8:50 AM   Result Value Ref Range    QRS Duration 94 ms    OHS QTC Calculation 396 ms       Latest Reference Range & Units 09/19/16 09:44 06/19/17 07:33 09/12/18 08:14 08/26/19 08:39 07/09/20 08:07 10/04/22 09:23 09/22/23 07:44 07/26/24 08:34  03/28/25 08:32   Cholesterol Total 120 - 199 mg/dL 122 153 144 150 153 160 187 144 152   HDL 40 - 75 mg/dL 37 (L) 44 33 (L) 40 36 (L) 29 (L) 24 (L) 25 (L) 32 (L)   HDL/Cholesterol Ratio 20.0 - 50.0 % 30.3 28.8 22.9 26.7 23.5 18.1 (L) 12.8 (L) 17.4 (L) 21.1   Non-HDL Cholesterol mg/dL 85 109 111 110 117 131 163 119 120   Total Cholesterol/HDL Ratio 2.0 - 5.0  3.3 3.5 4.4 3.8 4.3 5.5 (H) 7.8 (H) 5.8 (H) 4.8   Triglycerides 30 - 150 mg/dL 97 131 76 105 136 211 (H) 281 (H) 127 69   LDL Cholesterol 63.0 - 159.0 mg/dL 65.6 82.8 95.8 89.0 89.8 88.8 106.8 93.6 106.2     XR THORACOLUMBAR SPINE AP LATERAL Feb 2025 Impression:   No evidence for acute fracture, bone destruction, or subluxation.   Mild degenerative changes with mild disc space narrowing at the L4-5 and L5-S1 levels.   Left renal calculi.    CT PELVIS WITHOUT CONTRAST Jan 2025 Impression: No acute process in the pelvis.  No evidence of fracture.  Further evaluation as clinically indicated.    MRI LUMBAR SPINE WITHOUT CONTRAST Nov 2024 Impression: Mild multilevel degenerative change, with individual disc levels further detailed above.     CT RENAL STONE STUDY ABD PELVIS WO April 2024 Impression:   Multiple nonobstructing renal stones bilaterally measuring up to 5 mm.  No hydronephrosis.  Hepatic steatosis.  New 1.5 cm fat containing lesion adjacent to the descending colon, concerning for epiploic appendagitis.    XR LUMBAR SPINE 2 OR 3 VIEWS Sept 2023 FINDINGS:  The lumbosacral disc space is slightly narrowed.  The other disc spaces are well maintained.  No fracture, spondylolisthesis or bone destruction      XR SACRUM AND COCCYX Sept 2023 FINDINGS:  No fracture or dislocation.  No bone destruction identified.  The SI joints are within normal limits.identified     CT UROGRAM ABD PELVIS W WO Oct 2022 Impression:  Bilateral nonobstructing renal calculi  Several bilateral small renal hypodensities which are too small to characterize but likely represent cysts.     CT  UROGRAM ABD PELVIS W WO Oct 2022 Impression:  Bilateral nonobstructing renal calculi  Several bilateral small renal hypodensities which are too small to characterize but likely represent cysts.     X-Ray Foot Complete Left Aug 2017 Findings:  Since the prior examination the patient has undergone hammertoe release at the PIP of the little toe.  I detect no other significant change.   Lisfranc articulation is congruent.  Talar dome is maintained.      ASSESSMENT/PLAN:   MDM  Reviewed: previous chart, nursing note and vitals  Reviewed previous: labs, ECG, x-ray, CT scan and MRI  Interpretation: labs (low WBC and HDL otherwise relatively unremarkable Lipid Panel, CMP, CBC, TSH, HgA1C, CRP, ESR)       Health Maintenance   Topic Date Due    Influenza Vaccine (1) 09/01/2024    COVID-19 Vaccine (6 - 2024-25 season) 09/01/2024    TETANUS VACCINE  09/19/2026    RSV Vaccine (Age 60+ and Pregnant patients) (1 - 1-dose 75+ series) 10/03/2063    Hepatitis C Screening  Completed    HIV Screening  Completed    Pneumococcal Vaccines (Age 0-49)  Ela Out       Dong was seen today for [facility].    Diagnoses and all orders for this visit:    Encounter for screening and preventative care    History of kidney stones     Assessment & Plan      IMPRESSION:  - SI joint pain exacerbated with running and abduction against resistance.  - Previous imaging studies show mild degenerative changes in SI joints and spine.  - Low HDL cholesterol chronically low but not currently requiring intervention due to acceptable overall cholesterol levels.  - Fatigue potentially stress-related rather than due to low WBC count, which is not clinically significant in absence of frequent infections or lymph node issues.  - Removed hypertriglyceridemia from problem list based on improved lab results.  - History of kidney stones, with current presence of stones in both kidneys.  - Previous fatty liver diagnosis, but current labs suggest improvement,  potentially linked to resolved hypertriglyceridemia.  - Discussed stress as a potential contributor to nonspecific fatigue, affecting sleep quality and nutrient absorption.    - Educated on HDL cholesterol's protective role against LDL cholesterol.         The results of physical exam findings, labs, and imaging were reviewed with the patient. Management of above assessments/visit diagnoses was discussed with patient. Precautions for return discussed at length. Patient was given ample time for questions. All questions asked and answered to the satisfaction of the patient. Patient is in agreement with the above and verbalized understanding. Total time spent caring for the patient today was 30 minutes. This includes time spent before the visit reviewing the chart, time spent during the visit, and time spent after the visit on documentation.    Ayla Donahue MD  [facility]    Disclaimer: This document was created using voice recognition software ([employer]). Although it may be edited, this document may contain errors related to incorrect recognition of the spoken word. Please contact the physician if clarification is needed.           [1]   Social History  Tobacco Use    Smoking status: Never    Smokeless tobacco: Never   Substance Use Topics    Alcohol use: Yes     Alcohol/week: 2.0 standard drinks of alcohol     Types: 2 Standard drinks or equivalent per week     Comment: socially    Drug use: No   [2]   Current Outpatient Medications on File Prior to Visit   Medication Sig Dispense Refill    SOFDRA 12.45 % (72 mg /actuation) GlwP Apply 1 Dose topically once daily.      azelastine-fluticasone (DYMISTA) 137-50 mcg/spray Spry nassal spray 1 spray by Each Nostril route 2 (two) times daily. 23 g 11    levocetirizine (XYZAL) 5 MG tablet Take 1 tablet (5 mg total) by mouth every evening. 30 tablet 11    tadalafiL (CIALIS) 5 MG tablet Take 1 tablet (5 mg total) by  mouth once daily. 30 tablet 5     No current facility-administered medications on file prior to visit.

## 2025-04-04 ENCOUNTER — CLINICAL SUPPORT (OUTPATIENT)
Dept: REHABILITATION | Facility: HOSPITAL | Age: 35
End: 2025-04-04
Attending: STUDENT IN AN ORGANIZED HEALTH CARE EDUCATION/TRAINING PROGRAM
Payer: COMMERCIAL

## 2025-04-04 DIAGNOSIS — Z74.09 DECREASED FUNCTIONAL MOBILITY AND ENDURANCE: Primary | ICD-10-CM

## 2025-04-04 PROCEDURE — 97110 THERAPEUTIC EXERCISES: CPT

## 2025-04-04 PROCEDURE — 97112 NEUROMUSCULAR REEDUCATION: CPT

## 2025-04-04 NOTE — PROGRESS NOTES
"    Outpatient Rehab    Physical Therapy Visit    Patient Name: Dong Denny  MRN: [id number]  YOB: 1990  Encounter Date: 4/4/2025    Therapy Diagnosis:   Encounter Diagnosis   Name Primary?    Decreased functional mobility and endurance Yes     Physician: Curt Fritz MD    Physician Orders: Eval and Treat  Medical Diagnosis: Sacroiliitis  Myofascial pain syndrome  Lumbar spondylosis    Visit # / Visits Authorized:  3 / 12  Date of Evaluation:  3/14/2025  Insurance Authorization Period: 3/7/2025 to 12/31/2025  Plan of Care Certification:  3/14/2025 to 5/14/2025     PT/PTA:     Number of PTA visits since last PT visit:   Time In: 0803   Time Out: 0901  Total Time: 58   Total Billable Time:  55    FOTO:  Intake Score:  %  Survey Score 1:  %  Survey Score 2:  %         Subjective   feels some pain when lying supine on his back. Otherwise, has not had any pain lately.  Pain reported as 0/10.      Objective            Treatment:  Therapeutic Exercise  TE 3: double leg bridges x 20  TE 4: single leg bridge x 20  Balance/Neuromuscular Re-Education  NMR 1: Single leg bridge and double leg bridge holds, 10 x 5" holds with UE pull down using Cook band  NMR 2: Medical medx machine at 50 ft lbs, 20 reps, exertion 4.5/10 for isolated spinal engagement (increase 10% extension hold 1 sec more next visit)  NMR 3: Side planks with modified knees bent, 3 x 8  with and without BTB  NMR 4: Lateral stepping BTB above ankles, 10 lb KTB hold, 2 laps x 15 ft  NMR 5: SL shuttle squatting 75#, 4 x 8    Time Entry(in minutes):  Neuromuscular Re-Education Time Entry: 46  Therapeutic Exercise Time Entry: 9    Assessment & Plan   Assessment: Pt presents with mild subjective decrease in Left sided SI joint pain and initially has pain with left hip abduction strength testing. He had normal mobility with flick test bilaterally and was able to experience complete reduction in pain symptoms of Left SI joint following MedX " lumbar extension activation and multifidi activation interventions. Patient then progressed with double and single leg posterior oblique sling strengthening as well as lateral sling focus for remaining hip strength interventions. Patient to continue to progress as tolerated.  Evaluation/Treatment Tolerance: Patient tolerated treatment well      Patient will continue to benefit from skilled outpatient physical therapy to address the deficits listed in the problem list box on initial evaluation, provide pt/family education and to maximize pt's level of independence in the home and community environment.     Patient's spiritual, cultural, and educational needs considered and patient agreeable to plan of care and goals.           Plan:      Goals:   Active       Long Term Goals       Tolerate running 2-3 miles (Progressing)       Start:  03/14/25    Expected End:  05/14/25            Full strength LE without pain, SI tests reveal good mobility, full function with leisure and volley ball and walking dog (Progressing)       Start:  03/14/25    Expected End:  05/14/25               Self reported goals       run 5 km (Progressing)       Start:  03/14/25    Expected End:  05/14/25            Play volley ball (Progressing)       Start:  03/14/25    Expected End:  05/14/25               Short Term Goals       Tolerate walking consistently without issues (Progressing)       Start:  03/14/25    Expected End:  04/14/25            Good mobility SI with good strength LE and indep with HEP  (Progressing)       Start:  03/14/25    Expected End:  04/14/25                Siva Dixon PT

## 2025-04-11 ENCOUNTER — CLINICAL SUPPORT (OUTPATIENT)
Dept: REHABILITATION | Facility: HOSPITAL | Age: 35
End: 2025-04-11
Payer: COMMERCIAL

## 2025-04-11 DIAGNOSIS — Z74.09 DECREASED FUNCTIONAL MOBILITY AND ENDURANCE: Primary | ICD-10-CM

## 2025-04-11 DIAGNOSIS — R29.898 DECREASED STRENGTH OF TRUNK AND BACK: ICD-10-CM

## 2025-04-11 PROCEDURE — 97110 THERAPEUTIC EXERCISES: CPT

## 2025-04-11 PROCEDURE — 97112 NEUROMUSCULAR REEDUCATION: CPT

## 2025-04-11 NOTE — PROGRESS NOTES
"    Outpatient Rehab    Physical Therapy Visit    Patient Name: Dong Denny  MRN: [id number]  YOB: 1990  Encounter Date: 4/11/2025    Therapy Diagnosis:   Encounter Diagnoses   Name Primary?    Decreased functional mobility and endurance Yes    Decreased strength of trunk and back      Physician: Curt Fritz MD    Physician Orders: Eval and Treat  Medical Diagnosis: Sacroiliitis  Myofascial pain syndrome  Lumbar spondylosis    Visit # / Visits Authorized:  4 / 12  Date of Evaluation:  3/14/2025  Insurance Authorization Period: 3/7/2025 to 12/31/2025  Plan of Care Certification:  3/14/2025 to 5/14/2025     PT/PTA: PT   Number of PTA visits since last PT visit:   Time In: 0805   Time Out: 0900  Total Time: 55   Total Billable Time:  55    FOTO:  Intake Score:  %  Survey Score 1:  %  Survey Score 2:  %         Subjective   Pt woke up with left sided SI pain this morning, generally says pain is "the same".  Pain reported as 3/10.      Objective            Treatment:  Therapeutic Exercise  TE 2: QP multifidus activation 2 x 10  TE 3: double leg bridges x 20  TE 4: single leg bridge x 20  Balance/Neuromuscular Re-Education  NMR 1: Single leg bridge and double leg bridge holds, 10 x 5" holds with UE pull down  NMR 2: Medical medx machine at 55 ft lbs, 20 reps, exertion 3.5/10 for isolated spinal engagement (3 second extension hold)  NMR 3: Side planks with modified knees bent, 3 x 8  with  BTB  NMR 4: Lateral stepping BTB above ankles, 10 lb KTB hold, 2 laps x 15 ft    Time Entry(in minutes):  Neuromuscular Re-Education Time Entry: 35  Therapeutic Exercise Time Entry: 20    Assessment & Plan   Assessment: Pt presents with continued Left sided SI joint pain noted when running, triggered with resisted abduction.  Continued mulfidi activtion and lumbar extension activation with Lumbar MedX, extension hold increased to 3 seconds and weight increased by 10% to 55 ftHe had mild lee/lbs.  Patient then " continued with double and single leg posterior oblique sling strengthening as well as lateral sling focus for remaining hip strength interventions. Patient to continue to progress as tolerated.  Evaluation/Treatment Tolerance: Patient tolerated treatment well      Patient will continue to benefit from skilled outpatient physical therapy to address the deficits listed in the problem list box on initial evaluation, provide pt/family education and to maximize pt's level of independence in the home and community environment.     Patient's spiritual, cultural, and educational needs considered and patient agreeable to plan of care and goals.           Plan: Continue present POC    Goals:   Active       Long Term Goals       Tolerate running 2-3 miles (Progressing)       Start:  03/14/25    Expected End:  05/14/25            Full strength LE without pain, SI tests reveal good mobility, full function with leisure and volley ball and walking dog (Progressing)       Start:  03/14/25    Expected End:  05/14/25               Self reported goals       run 5 km (Progressing)       Start:  03/14/25    Expected End:  05/14/25            Play volley ball (Progressing)       Start:  03/14/25    Expected End:  05/14/25               Short Term Goals       Tolerate walking consistently without issues (Progressing)       Start:  03/14/25    Expected End:  04/14/25            Good mobility SI with good strength LE and indep with HEP  (Progressing)       Start:  03/14/25    Expected End:  04/14/25                Cheryl Bob, PT

## 2025-04-24 DIAGNOSIS — J30.9 ALLERGIC RHINITIS, UNSPECIFIED SEASONALITY, UNSPECIFIED TRIGGER: Chronic | ICD-10-CM

## 2025-04-24 DIAGNOSIS — J31.0 CHRONIC RHINITIS: Chronic | ICD-10-CM

## 2025-04-24 RX ORDER — LEVOCETIRIZINE DIHYDROCHLORIDE 5 MG/1
5 TABLET, FILM COATED ORAL NIGHTLY
Qty: 30 TABLET | Refills: 11 | Status: SHIPPED | OUTPATIENT
Start: 2025-04-24

## 2025-04-25 RX ORDER — TADALAFIL 5 MG/1
5 TABLET ORAL DAILY
Qty: 30 TABLET | Refills: 5 | Status: SHIPPED | OUTPATIENT
Start: 2025-04-25 | End: 2025-10-22

## 2025-05-02 ENCOUNTER — CLINICAL SUPPORT (OUTPATIENT)
Dept: REHABILITATION | Facility: HOSPITAL | Age: 35
End: 2025-05-02
Payer: COMMERCIAL

## 2025-05-02 DIAGNOSIS — Z74.09 DECREASED FUNCTIONAL MOBILITY AND ENDURANCE: Primary | ICD-10-CM

## 2025-05-02 PROCEDURE — 97112 NEUROMUSCULAR REEDUCATION: CPT

## 2025-05-02 NOTE — PROGRESS NOTES
"  Outpatient Rehab    Physical Therapy Visit    Patient Name: Dong Denny  MRN: [id number]  YOB: 1990  Encounter Date: 5/2/2025    Therapy Diagnosis:   Encounter Diagnosis   Name Primary?    Decreased functional mobility and endurance Yes     Physician: Curt Fritz MD    Physician Orders: Eval and Treat  Medical Diagnosis: Sacroiliitis  Myofascial pain syndrome  Lumbar spondylosis    Visit # / Visits Authorized:  5 / 12  Insurance Authorization Period: 3/7/2025 to 12/31/2025  Date of Evaluation:  3/14/2025   Plan of Care Certification:  3/14/2025 to 5/14/2025      PT/PTA:     Number of PTA visits since last PT visit:   Time In: 0815   Time Out: 0900  Total Time: 45   Total Billable Time: 45    FOTO:  Intake Score:  %  Survey Score 1:  %  Survey Score 2:  %         Subjective   has not had too much left sided low back pain but also has not been super consistent with his home exercises lately due to being out of town for work alot.         Objective            Treatment:  Balance/Neuromuscular Re-Education  NMR 1: Single leg bridge holds, 10 x 5" holds with UE pull down  NMR 5: SL shuttle squatting 75#, 100# and 125# 15 reps, 15 reps, 10 reps  NMR 6: SL Paloff press, GTB 2 x 10 with shoudler flexion  NMR 7: Paloff press with outside LE fwd staggered, 2 x 10 GTB  NMR 8: SL sports cord 20-60 degree squats blue cord, 3 x 1 min    Time Entry(in minutes):  Neuromuscular Re-Education Time Entry: 45    Assessment & Plan   Assessment: Pt presents with continued Left sided SI joint pain noted when running but has no pain with resisted abduction strength testing. Progressed with single leg glute strength and sports cord squatting. SI joint mobility equal bilaterally. No pain with active extension or flexion. Patient to continue to progress as tolerated.  Evaluation/Treatment Tolerance: Patient tolerated treatment well    Patient will continue to benefit from skilled outpatient physical therapy to " address the deficits listed in the problem list box on initial evaluation, provide pt/family education and to maximize pt's level of independence in the home and community environment.     Patient's spiritual, cultural, and educational needs considered and patient agreeable to plan of care and goals.           Plan:      Goals:   Active       Long Term Goals       Tolerate running 2-3 miles (Progressing)       Start:  03/14/25    Expected End:  05/14/25            Full strength LE without pain, SI tests reveal good mobility, full function with leisure and volley ball and walking dog (Progressing)       Start:  03/14/25    Expected End:  05/14/25               Self reported goals       run 5 km (Progressing)       Start:  03/14/25    Expected End:  05/14/25            Play volley ball (Progressing)       Start:  03/14/25    Expected End:  05/14/25               Short Term Goals       Tolerate walking consistently without issues (Progressing)       Start:  03/14/25    Expected End:  04/14/25            Good mobility SI with good strength LE and indep with HEP  (Progressing)       Start:  03/14/25    Expected End:  04/14/25                Siva Dixon, PT

## 2025-05-09 ENCOUNTER — CLINICAL SUPPORT (OUTPATIENT)
Dept: REHABILITATION | Facility: HOSPITAL | Age: 35
End: 2025-05-09
Payer: COMMERCIAL

## 2025-05-09 DIAGNOSIS — Z74.09 DECREASED FUNCTIONAL MOBILITY AND ENDURANCE: Primary | ICD-10-CM

## 2025-05-09 PROCEDURE — 97530 THERAPEUTIC ACTIVITIES: CPT

## 2025-05-09 PROCEDURE — 97112 NEUROMUSCULAR REEDUCATION: CPT

## 2025-05-09 NOTE — PROGRESS NOTES
"  Outpatient Rehab    Physical Therapy Visit    Patient Name: Dong Denny  MRN: [id number]  YOB: 1990  Encounter Date: 5/9/2025    Therapy Diagnosis:   Encounter Diagnosis   Name Primary?    Decreased functional mobility and endurance Yes     Physician: Curt Fritz MD    Physician Orders: Eval and Treat  Medical Diagnosis: Sacroiliitis  Myofascial pain syndrome  Lumbar spondylosis    Visit # / Visits Authorized:  6 / 12  Insurance Authorization Period: 3/7/2025 to 12/31/2025  Date of Evaluation: 3/14/2025  Plan of Care Certification: 3/14/2025 to 5/14/2025      PT/PTA:     Number of PTA visits since last PT visit:   Time In: 1002   Time Out: 1101  Total Time (in minutes): 59   Total Billable Time (in minutes): 55    FOTO:  Intake Score:  %  Survey Score 2:  %  Survey Score 3:  %    Precautions:       Subjective   states he has been more consistent with his exercises in the past week.  Pain reported as 2/10.      Objective            Treatment:  Balance/Neuromuscular Re-Education  NMR 1: Single leg bridge holds, 10 x 5" holds with UE pull down  NMR 2: Side planks with clamshell BTB, 3 x 8  NMR 3: Lateral stepping BTB at ankles, 10 lb KTB fwd press, 4 laps x 10 ft  NMR 4: Heel taps from 4 inch box, 2 x 10 with 10 lb KTB hold anterior  NMR 6: SL Paloff press, GTB 2 x 10 with shoudler flexion  Therapeutic Activity  TA 1: Single leg shuttle squats, 75#, 100#, and 125#, 15 reps at each, Bilateral  TA 2: Single leg squatting with sports cord, 3 x 1 minute    Time Entry(in minutes):  Neuromuscular Re-Education Time Entry: 44  Therapeutic Activity Time Entry: 11    Assessment & Plan   Assessment: Patient presents with continued complaints of Left sided SI joint pain noted when running but has no pain with resisted hip abduction and hip extension strength testing. He also demonstrates no pain with cardinal plane lumbar active motion assessment. Flick test negative bilaterally and moves equally. " Progressed with single leg glute strength and sports cord squatting. SI joint mobility equal bilaterally. Patient to continue to progress as tolerated.  Evaluation/Treatment Tolerance: Patient tolerated treatment well    Patient will continue to benefit from skilled outpatient physical therapy to address the deficits listed in the problem list box on initial evaluation, provide pt/family education and to maximize pt's level of independence in the home and community environment.     Patient's spiritual, cultural, and educational needs considered and patient agreeable to plan of care and goals.           Plan:      Goals:   Active       Long Term Goals       Tolerate running 2-3 miles (Progressing)       Start:  03/14/25    Expected End:  05/14/25            Full strength LE without pain, SI tests reveal good mobility, full function with leisure and volley ball and walking dog (Progressing)       Start:  03/14/25    Expected End:  05/14/25               Self reported goals       run 5 km (Progressing)       Start:  03/14/25    Expected End:  05/14/25            Play volley ball (Progressing)       Start:  03/14/25    Expected End:  05/14/25              Resolved       Short Term Goals       Tolerate walking consistently without issues (Met)       Start:  03/14/25    Expected End:  04/14/25    Resolved:  05/10/25         Good mobility SI with good strength LE and indep with HEP  (Met)       Start:  03/14/25    Expected End:  04/14/25    Resolved:  05/10/25             Siva Dixon PT

## 2025-05-16 ENCOUNTER — CLINICAL SUPPORT (OUTPATIENT)
Dept: REHABILITATION | Facility: HOSPITAL | Age: 35
End: 2025-05-16
Payer: COMMERCIAL

## 2025-05-16 DIAGNOSIS — Z74.09 DECREASED FUNCTIONAL MOBILITY AND ENDURANCE: Primary | ICD-10-CM

## 2025-05-16 PROCEDURE — 97110 THERAPEUTIC EXERCISES: CPT

## 2025-05-16 PROCEDURE — 97112 NEUROMUSCULAR REEDUCATION: CPT

## 2025-05-16 PROCEDURE — 97140 MANUAL THERAPY 1/> REGIONS: CPT

## 2025-05-16 NOTE — PROGRESS NOTES
"  Outpatient Rehab    Physical Therapy Progress Note : Updated Plan of Care    Patient Name: Dong Denny  MRN: [id number]  YOB: 1990  Encounter Date: 5/16/2025    Therapy Diagnosis:   Encounter Diagnosis   Name Primary?    Decreased functional mobility and endurance Yes     Physician: Curt Fritz MD    Physician Orders: Eval and Treat  Medical Diagnosis: Sacroiliitis  Myofascial pain syndrome  Lumbar spondylosis    Visit # / Visits Authorized:  7 / 12  Insurance Authorization Period: 3/7/2025 to 12/31/2025  Date of Evaluation: 3/14/2025  Plan of Care Certification: 5/16/2025 to 7/18/2025     PT/PTA:     Number of PTA visits since last PT visit:   Time In: 0908   Time Out: 1008  Total Time (in minutes): 60   Total Billable Time (in minutes): 55    FOTO:  Intake Score:  %  Survey Score 2:  %  Survey Score 3:  %    Precautions:       Subjective   states he felt left low back pain yesterday after pushing the  for a while and when he walked his dogs after. States the low back pain has improved slightly since beginning therapy and is hoping to continue level of consistency with his exercises at home..  Pain reported as 2/10.      Objective      Lumbar Range of Motion   Active (deg) Passive (deg) Pain   Flexion 40       Extension 10       Right Lateral Flexion 15       Right Rotation 5       Left Lateral Flexion 15       Left Rotation 5                        Treatment:  Therapeutic Exercise  TE 1: SL Shuttle squat 100# and 125#, 2 x 10 each LE  TE 2: DL squatting assessment  Manual Therapy  MT 1: Long axis hip distraction grades II/III --> V (patient agreeable to manipulation)  MT 2: Lumbar opening rotational mobs, grades III/IV  Balance/Neuromuscular Re-Education  NMR 1: Single leg bridge holds, 10 x 5" holds with UE pull down  NMR 2: Side planks with clamshell BTB, 3 x 8  NMR 3: Sidelying hip external rotation 2#, 2 x 12  NMR 5: SL shuttle squatting 75#, 100# and 125# 15 reps, 15 " reps, 10 reps  NMR 6: SL Paloff press, Black TBand with KTB press,  2 x 10 with shoudler flexion    Time Entry(in minutes):  Manual Therapy Time Entry: 9  Neuromuscular Re-Education Time Entry: 36  Therapeutic Exercise Time Entry: 10    Assessment & Plan         Assessment Details: Patient presents for re-assessment and has completed 7 visits of therapy. He has slightly improved symptoms into his left SI joint and low back. Patient has not returned to running yet but remains with steady improvements in posterior oblique sling and lateral sling. His SI joint and lumbar joint mobility have normalized and remain pain-free along with having no pain during cardinal plane lumbar active motion assessment. Progressed with single leg glute strength and hip external rotator strengthening. Patient to continue to progress as tolerated.      Patient will continue to benefit from skilled outpatient physical therapy to address the deficits listed in the problem list box on initial evaluation, provide pt/family education and to maximize pt's level of independence in the home and community environment.     Patient's spiritual, cultural, and educational needs considered and patient agreeable to plan of care and goals.           Goals:   Active       Long Term Goals       Tolerate running 2-3 miles (Progressing)       Start:  03/14/25    Expected End:  05/14/25            Full strength LE without pain, SI tests reveal good mobility, full function with leisure and volley ball and walking dog (Progressing)       Start:  03/14/25    Expected End:  05/14/25               Self reported goals       run 5 km (Progressing)       Start:  03/14/25    Expected End:  05/14/25            Play volley ball (Progressing)       Start:  03/14/25    Expected End:  05/14/25              Resolved       Short Term Goals       Tolerate walking consistently without issues (Met)       Start:  03/14/25    Expected End:  04/14/25    Resolved:  05/10/25          Good mobility SI with good strength LE and indep with HEP  (Met)       Start:  03/14/25    Expected End:  04/14/25    Resolved:  05/10/25             Siva Dixon PT

## 2025-05-23 ENCOUNTER — CLINICAL SUPPORT (OUTPATIENT)
Dept: REHABILITATION | Facility: HOSPITAL | Age: 35
End: 2025-05-23
Payer: COMMERCIAL

## 2025-05-23 DIAGNOSIS — Z74.09 DECREASED FUNCTIONAL MOBILITY AND ENDURANCE: Primary | ICD-10-CM

## 2025-05-23 PROCEDURE — 97140 MANUAL THERAPY 1/> REGIONS: CPT

## 2025-05-23 PROCEDURE — 97530 THERAPEUTIC ACTIVITIES: CPT

## 2025-05-23 PROCEDURE — 97112 NEUROMUSCULAR REEDUCATION: CPT

## 2025-05-23 NOTE — PROGRESS NOTES
"  Outpatient Rehab    Physical Therapy Visit    Patient Name: Dong Denny  MRN: [id number]  YOB: 1990  Encounter Date: 5/23/2025    Therapy Diagnosis:   Encounter Diagnosis   Name Primary?    Decreased functional mobility and endurance Yes     Physician: Curt Fritz MD    Physician Orders: Eval and Treat  Medical Diagnosis: Sacroiliitis  Myofascial pain syndrome  Lumbar spondylosis    Visit # / Visits Authorized:  8 / 12  Insurance Authorization Period: 3/7/2025 to 12/31/2025  Date of Evaluation: 3/14/2025  Plan of Care Certification: 5/16/2025 to 7/18/2025      PT/PTA:     Number of PTA visits since last PT visit:   Time In: 1003   Time Out: 1100  Total Time (in minutes): 57   Total Billable Time (in minutes): 54    FOTO:  Intake Score:  %  Survey Score 2:  %  Survey Score 3:  %    Precautions:       Subjective   still feels pain with long lever abduction, lying down on hard surface, and with any kind of faster walking.  Pain reported as 2/10.      Objective            Treatment:  Manual Therapy  MT 1: Long axis hip distraction grades II/III --> V (patient agreeable to manipulation)  MT 2: Left hip inferior/lateral glides, grades II/III  MT 3: Mobility assessment of SI joint and hips  Balance/Neuromuscular Re-Education  NMR 1: Single leg bridge holds, 10 x 5" holds  NMR 2: Side planks with clamshell BTB, 3 x 8  NMR 3: Sidelying hip external rotation 3#, 3 x 15  NMR 5: SL paloff press with BTB and SL balance, 2 x 20  NMR 7: Paloff press with outside LE fwd staggered, 2 x 10 using BTB  Therapeutic Activity  TA 1: SL shuttle squatting 100# and 125#, 2 x 15 reps with each weight  TA 2: SL shuttle squatting 150# and 175#, 2 x 15 reps with each weight    Time Entry(in minutes):  Manual Therapy Time Entry: 8  Neuromuscular Re-Education Time Entry: 36  Therapeutic Activity Time Entry: 10    Assessment & Plan   Assessment: Patient presents with negative SI symptoms into his left SI joint and low " back. Patient has not yet returned to running, but was able to improve glute strength on shuttle during today's session. His SI joint shows symmetry with flick test and with standing flexion test. Mild posterior SI joint pain felt with SHILPA, but otherwise negative for all other SI joint tests. Continues to progress with single leg glute strength and hip external rotator strengthening. Patient will continue to work towards further strengthening of glutes and will be re-assessed next session.  Evaluation/Treatment Tolerance: Patient tolerated treatment well    The patient will continue to benefit from skilled outpatient physical therapy in order to address the deficits listed in the problem list on the initial evaluation, provide patient and family education, and maximize the patients level of independence in the home and community environments.     The patient's spiritual, cultural, and educational needs were considered, and the patient is agreeable to the plan of care and goals.           Plan:      Goals:   Active       Long Term Goals       Tolerate running 2-3 miles (Progressing)       Start:  03/14/25    Expected End:  05/14/25            Full strength LE without pain, SI tests reveal good mobility, full function with leisure and volley ball and walking dog (Progressing)       Start:  03/14/25    Expected End:  05/14/25               Self reported goals       run 5 km (Progressing)       Start:  03/14/25    Expected End:  05/14/25            Play volley ball (Progressing)       Start:  03/14/25    Expected End:  05/14/25              Resolved       Short Term Goals       Tolerate walking consistently without issues (Met)       Start:  03/14/25    Expected End:  04/14/25    Resolved:  05/10/25         Good mobility SI with good strength LE and indep with HEP  (Met)       Start:  03/14/25    Expected End:  04/14/25    Resolved:  05/10/25             Siva Dixon PT

## 2025-06-16 ENCOUNTER — TELEPHONE (OUTPATIENT)
Dept: PAIN MEDICINE | Facility: CLINIC | Age: 35
End: 2025-06-16

## 2025-06-16 NOTE — TELEPHONE ENCOUNTER
Staff attempted to contact patient to assist with rescheduling appointment due to provider being in procedures. Staff left voicemail.

## 2025-07-15 ENCOUNTER — OFFICE VISIT (OUTPATIENT)
Dept: PAIN MEDICINE | Facility: CLINIC | Age: 35
End: 2025-07-15
Payer: COMMERCIAL

## 2025-07-15 ENCOUNTER — TELEPHONE (OUTPATIENT)
Dept: PAIN MEDICINE | Facility: CLINIC | Age: 35
End: 2025-07-15

## 2025-07-15 VITALS
HEART RATE: 71 BPM | WEIGHT: 163.81 LBS | HEIGHT: 69 IN | DIASTOLIC BLOOD PRESSURE: 76 MMHG | SYSTOLIC BLOOD PRESSURE: 115 MMHG | BODY MASS INDEX: 24.26 KG/M2

## 2025-07-15 DIAGNOSIS — M46.1 SACROILIITIS: Primary | ICD-10-CM

## 2025-07-15 DIAGNOSIS — M51.360 DEGENERATION OF INTERVERTEBRAL DISC OF LUMBAR REGION WITH DISCOGENIC BACK PAIN: ICD-10-CM

## 2025-07-15 PROCEDURE — 1160F RVW MEDS BY RX/DR IN RCRD: CPT | Mod: CPTII,S$GLB,, | Performed by: STUDENT IN AN ORGANIZED HEALTH CARE EDUCATION/TRAINING PROGRAM

## 2025-07-15 PROCEDURE — 99999 PR PBB SHADOW E&M-EST. PATIENT-LVL III: CPT | Mod: PBBFAC,,, | Performed by: STUDENT IN AN ORGANIZED HEALTH CARE EDUCATION/TRAINING PROGRAM

## 2025-07-15 PROCEDURE — 3078F DIAST BP <80 MM HG: CPT | Mod: CPTII,S$GLB,, | Performed by: STUDENT IN AN ORGANIZED HEALTH CARE EDUCATION/TRAINING PROGRAM

## 2025-07-15 PROCEDURE — 99214 OFFICE O/P EST MOD 30 MIN: CPT | Mod: S$GLB,,, | Performed by: STUDENT IN AN ORGANIZED HEALTH CARE EDUCATION/TRAINING PROGRAM

## 2025-07-15 PROCEDURE — 3008F BODY MASS INDEX DOCD: CPT | Mod: CPTII,S$GLB,, | Performed by: STUDENT IN AN ORGANIZED HEALTH CARE EDUCATION/TRAINING PROGRAM

## 2025-07-15 PROCEDURE — 3074F SYST BP LT 130 MM HG: CPT | Mod: CPTII,S$GLB,, | Performed by: STUDENT IN AN ORGANIZED HEALTH CARE EDUCATION/TRAINING PROGRAM

## 2025-07-15 PROCEDURE — 3044F HG A1C LEVEL LT 7.0%: CPT | Mod: CPTII,S$GLB,, | Performed by: STUDENT IN AN ORGANIZED HEALTH CARE EDUCATION/TRAINING PROGRAM

## 2025-07-15 PROCEDURE — 1159F MED LIST DOCD IN RCRD: CPT | Mod: CPTII,S$GLB,, | Performed by: STUDENT IN AN ORGANIZED HEALTH CARE EDUCATION/TRAINING PROGRAM

## 2025-07-15 PROCEDURE — G2211 COMPLEX E/M VISIT ADD ON: HCPCS | Mod: S$GLB,,, | Performed by: STUDENT IN AN ORGANIZED HEALTH CARE EDUCATION/TRAINING PROGRAM

## 2025-07-15 NOTE — PROGRESS NOTES
Chronic patient Established Note (Follow up visit)      SUBJECTIVE:  INTERVAL HISTORY 7/15/2025:  Mr. Denny returns for lower back pain. Current Pain level is 3-4/10.  He completed the healthy back program, and overall feels better in regards to his back, but he still feels he has sacroiliac joint pain.  He again attest excellent relief from his last left SI joint injection with improved quality of life, however the relief was short lived (less than 2 weeks).  He is seeking a longer lasting alternative.    INTERVAL HISTORY 2/24/2025:  Dong Denny presents to the clinic for a follow-up appointment for chronic pain. Current pain intensity is 0/10.  Since the last visit, Dong Denny states:  he reports that he had about 50% relief from his last left SI joint injection.  He feels his pain gets worse with left abduction and running.  He reports he hasn't really run since August    PRIOR HISTORY (2/11/25 - MAGUE Carlos):   Dong Denny is a 34 y.o. male who returns to me today for CT results.  He was last seen by me 11/4/2024.  Today he is doing well but notes he continues to have isolated left sided low back pain. He had a left SIJ injection on 11/22/24 with minimal relief   The Patient denies myelopathic symptoms such as handwriting changes or difficulty with buttons/coins/keys. Denies perineal paresthesias, bowel/bladder dysfunction.      Pain Disability Index Review:      7/15/2025     2:53 PM 2/24/2025     3:20 PM   Last 3 PDI Scores   Pain Disability Index (PDI) 28 0       Pain Medications:   - none    Opioid Contract: not applicable     report:  Reviewed and consistent with medication use as prescribed.    Pain Procedures:   11/5/2024 - left SI J - 75% improvement for about 2 weeks    Physical Therapy/Home Exercise: yes, with no significant improvement    Imaging:   CT PELVIS WITHOUT CONTRAST     CLINICAL HISTORY:  Pelvis pain, stress fracture suspected, neg xray;   Pelvic and perineal pain     TECHNIQUE:  CT of the pelvis without intravenous contrast.  Coronal and sagittal reformats were obtained.     COMPARISON:  CT from 10/14/2022     FINDINGS:  Visualized portions of the bowel demonstrate no focal wall thickening.  No evidence of obstruction.  Urinary bladder is normal in appearance.  Prostate is not enlarged.  No evidence of free fluid.  No lymphadenopathy.  Vascular structures demonstrate no calcifications.  No evidence of hernia.  Osseous structures demonstrate no evidence of acute fracture.  Osseous bridging of the left SI joint and mild degenerative changes of the right SI joint.  No evidence of erosions.  No osseous destructive process.     Impression:     No acute process in the pelvis.  No evidence of fracture.  Further evaluation as clinically indicated.        Electronically signed by:Keith Eckert MD  Date:                                            01/17/2025  Time:                                           08:50    XR THORACOLUMBAR SPINE AP LATERAL     CLINICAL HISTORY:  Sacrococcygeal disorders, not elsewhere classified     TECHNIQUE:  AP and lateral views of the thoracolumbar spine were performed.     COMPARISON:  None     FINDINGS:  Posterior vertebral alignment is satisfactory.  Vertebral body heights are well maintained.  There is no evidence for acute fracture or bone destruction.  There are mild degenerative changes within the thoracolumbar spine with narrowing of the L4-5 and L5-S1 intervertebral disc spaces.  There are calcific densities projecting over the left kidney and left renal calculi are suspected.     Impression:     No evidence for acute fracture, bone destruction, or subluxation.     Mild degenerative changes with mild disc space narrowing at the L4-5 and L5-S1 levels.     Left renal calculi.        Electronically signed by:Chang Rosales MD  Date:                                            02/21/2025  Time:                                            13:12      MRI LUMBAR SPINE WITHOUT CONTRAST     CLINICAL HISTORY:  Low back pain, symptoms persist with > 6wks conservative treatment; Dorsalgia, unspecified     TECHNIQUE:  Multiplanar, multisequence MR images were acquired from the thoracolumbar junction to the sacrum without the administration of contrast.     COMPARISON:  Radiograph 09/22/2023, CT abdomen pelvis 04/05/2024     FINDINGS:  The vertebral bodies demonstrate no evidence of fracture, osseous destructive process or aggressive bone marrow replacement process.     Normal sagittal alignment is preserved.  No spondylolisthesis.     Degenerative changes individual disc levels further discussed below:     L1-2: Mild disc desiccation subtle loss of disc height disc bulging.  No significant spinal stenosis or neural foraminal narrowing.     L2-3: No significant degenerative disc disease.  Mild facet arthropathy.  No spinal stenosis or neural foraminal narrowing.     L3-4: Mild disc bulging with superimposed right foraminal disc osteophyte complex.  This abuts the exiting right L3 nerve root.  No significant spinal canal stenosis or left-sided neural foraminal narrowing.  Modest facet arthropathy.     L4-5: Mild disc bulging with circumferential endplate marginal osteophyte formation.  No significant spinal stenosis.  Modest neural foraminal narrowing.     L5-S1: Annular fissure with shallow central/right paracentral disc protrusion.  This narrows the right lateral recess abutting the descending S1 nerve root.  No significant spinal canal stenosis or neural foraminal narrowing.     The terminal spinal cord, conus, and cauda equina demonstrate normal caliber, morphology, and signal.     No intraspinal mass or fluid collection.     No acute abnormalities in the visualized paraspinal soft tissue structures.     Impression:     Mild multilevel degenerative change, with individual disc levels further detailed above.        Electronically signed by:Tristan  MD Andrew  Date:                                            11/01/2024  Time:                                           17:10    Allergies:   Review of patient's allergies indicates:  No Known Allergies    Current Medications:   Current Medications[1]    REVIEW OF SYSTEMS:    GENERAL:  No new weight loss, malaise or fevers.  HEENT:  Negative for new frequent or significant headaches.  NECK:  Negative for new lumps, goiter, and significant neck swelling.  RESPIRATORY:  Negative for new cough, wheezing or shortness of breath.  CARDIOVASCULAR:  Negative for new chest pain, leg swelling or palpitations.  GI:  Negative for new abdominal discomfort, blood in stools or black stools or change in bowel habits.  MUSCULOSKELETAL:  See HPI.  SKIN:  Negative for new lesions, rash, and itching.  PSYCH:  Negative for new sleep disturbance, mood disorder  HEMATOLOGY/LYMPHOLOGY:  Negative for new prolonged bleeding, bruising easily or swollen nodes.  NEURO:   No new headaches, syncope, paralysis, seizures or tremors.  All other reviewed and negative other than HPI.    Past Medical History:  Past Medical History:   Diagnosis Date    Androgenetic alopecia     Chronic rhinosinusitis     Dyslipidemia 2022    History of kidney stones 10/21/2024    Kidney stones     Low testosterone        Past Surgical History:  Past Surgical History:   Procedure Laterality Date    INJECTION, SACROILIAC JOINT Left 11/22/2024    Procedure: LT SI Joint inj;  Surgeon: Curt Fritz MD;  Location: [facility];  Service: Pain Management;  Laterality: Left;  no sed  no AC    TOE SURGERY Left 2015    TONSILLECTOMY      TURBINATE RESECTION Bilateral 2010, 2006    done twice    TYMPANOSTOMY TUBE PLACEMENT Bilateral 1990       Family History:  Family History   Problem Relation Name Age of Onset    Celiac disease Mother      Diabetes Father Camron     No Known Problems Sister      No Known Problems Maternal Grandmother      No Known Problems Maternal  "Grandfather      No Known Problems Paternal Grandmother      No Known Problems Paternal Grandfather         Social History:  Social History     Socioeconomic History    Marital status:    Occupational History    Occupation:    Tobacco Use    Smoking status: Never    Smokeless tobacco: Never   Substance and Sexual Activity    Alcohol use: Yes     Alcohol/week: 2.0 standard drinks of alcohol     Types: 2 Standard drinks or equivalent per week     Comment: socially    Drug use: No    Sexual activity: Yes     Partners: Female     Birth control/protection: None     Social Drivers of Health     Financial Resource Strain: Low Risk  (2/11/2025)    Overall Financial Resource Strain (CARDIA)     Difficulty of Paying Living Expenses: Not hard at all   Food Insecurity: No Food Insecurity (2/11/2025)    Hunger Vital Sign     Worried About Running Out of Food in the Last Year: Never true     Ran Out of Food in the Last Year: Never true   Transportation Needs: No Transportation Needs (2/11/2025)    PRAPARE - Transportation     Lack of Transportation (Medical): No     Lack of Transportation (Non-Medical): No   Physical Activity: Sufficiently Active (2/11/2025)    Exercise Vital Sign     Days of Exercise per Week: 5 days     Minutes of Exercise per Session: 30 min   Stress: No Stress Concern Present (2/11/2025)    [employer] - Occupational Stress Questionnaire     Feeling of Stress : Only a little   Housing Stability: Low Risk  (2/11/2025)    Housing Stability Vital Sign     Unable to Pay for Housing in the Last Year: No     Homeless in the Last Year: No       OBJECTIVE:    /76 (BP Location: Left arm, Patient Position: Sitting)   Pulse 71   Ht 5' 9" (1.753 m)   Wt 74.3 kg (163 lb 12.8 oz)   BMI 24.19 kg/m²     PHYSICAL EXAMINATION:  General appearance: Well appearing, in no acute distress, alert and appropriately communicative.  Psych:  Mood and affect " appropriate.  Skin: Skin color, texture, turgor normal, no rashes or lesions, in both upper and lower body for exposed skin.  Head/face:  Atraumatic, normocephalic.  Cor: regular rate  Pulm: non-labored breathing  GI: Abdomen non-distended and non-tender.  Back: Straight leg raising in the sitting and supine positions is negative to radicular pain. No pain to palpation over the spine or paraspinal muscles. Normal range of motion without pain reproduction.  Extremities: No deformities, significant lymphedema, or skin discoloration. No significant open wounds. No major amputations.  Musculoskeletal: hip, sacroiliac and knee provocative maneuvers are negative with the exception of left SI tenderness, +left Elaine. Bilateral upper and lower extremity strength is normal and symmetric.  No atrophy or tone abnormalities are noted.  Neuro: Bilateral upper and lower extremity coordination and muscle stretch reflexes abnormalities noted: none.  [place] and/or Clonus: negative; loss of sensation to light touch: none  Gait: normal    CMP  Sodium   Date Value Ref Range Status   03/28/2025 143 136 - 145 mmol/L Final   07/26/2024 140 136 - 145 mmol/L Final     Potassium   Date Value Ref Range Status   03/28/2025 5.1 3.5 - 5.1 mmol/L Final   07/26/2024 4.5 3.5 - 5.1 mmol/L Final     Chloride   Date Value Ref Range Status   03/28/2025 110 95 - 110 mmol/L Final   07/26/2024 106 95 - 110 mmol/L Final     CO2   Date Value Ref Range Status   03/28/2025 24 23 - 29 mmol/L Final   07/26/2024 27 23 - 29 mmol/L Final     Glucose   Date Value Ref Range Status   03/28/2025 105 70 - 110 mg/dL Final   07/26/2024 95 70 - 110 mg/dL Final     BUN   Date Value Ref Range Status   03/28/2025 15 6 - 20 mg/dL Final     Creatinine   Date Value Ref Range Status   03/28/2025 0.8 0.5 - 1.4 mg/dL Final     Calcium   Date Value Ref Range Status   03/28/2025 9.4 8.7 - 10.5 mg/dL Final   07/26/2024 9.5 8.7 - 10.5 mg/dL Final     Protein Total   Date Value Ref  Range Status   03/28/2025 7.1 6.0 - 8.4 gm/dL Final     Total Protein   Date Value Ref Range Status   07/26/2024 7.0 6.0 - 8.4 g/dL Final     Albumin   Date Value Ref Range Status   03/28/2025 4.4 3.5 - 5.2 g/dL Final   07/26/2024 4.4 3.5 - 5.2 g/dL Final   09/25/2017 4.9 3.6 - 5.1 g/dL Final     Comment:     @ Test Performed By:  [employer] SIMRAN Rome M.D..,   [address] [place] [id number]  [facility]  [id number]       Total Bilirubin   Date Value Ref Range Status   07/26/2024 0.5 0.1 - 1.0 mg/dL Final     Comment:     For infants and newborns, interpretation of results should be based  on gestational age, weight and in agreement with clinical  observations.    Premature Infant recommended reference ranges:  Up to 24 hours.............<8.0 mg/dL  Up to 48 hours............<12.0 mg/dL  3-5 days..................<15.0 mg/dL  6-29 days.................<15.0 mg/dL       Bilirubin Total   Date Value Ref Range Status   03/28/2025 0.3 0.1 - 1.0 mg/dL Final     Comment:     For infants and newborns, interpretation of results should be based   on gestational age, weight and in agreement with clinical   observations.    Premature Infant recommended reference ranges:   0-24 hours:  <8.0 mg/dL   24-48 hours: <12.0 mg/dL   3-5 days:    <15.0 mg/dL   6-29 days:   <15.0 mg/dL     Alkaline Phosphatase   Date Value Ref Range Status   07/26/2024 40 (L) 55 - 135 U/L Final     ALP   Date Value Ref Range Status   03/28/2025 44 40 - 150 unit/L Final     AST   Date Value Ref Range Status   03/28/2025 14 11 - 45 unit/L Final   07/26/2024 15 10 - 40 U/L Final     ALT   Date Value Ref Range Status   03/28/2025 27 10 - 44 unit/L Final   07/26/2024 24 10 - 44 U/L Final     Anion Gap   Date Value Ref Range Status   03/28/2025 9 8 - 16 mmol/L Final     eGFR   Date Value Ref Range Status   03/28/2025 >60 >60 mL/min/1.73/m2 Final     Comment:     Estimated GFR calculated using the  CKD-EPI creatinine (2021) equation.   07/26/2024 >60.0 >60 mL/min/1.73 m^2 Final     ASSESSMENT: 34 y.o. year old male with chronic pain, consistent with:     1. Sacroiliitis  Procedure Order to Pain Management      2. Degeneration of intervertebral disc of lumbar region with discogenic back pain          IMPRESSION: Dong Denny presents today for left lower back pain. History and physical exam are consistent with left sacroiliitis.  Imaging is consistent with bilateral sacroiliac joint degenerative disease.  He had excellent, but short lived relief from his bilateral SI joint injections.  They have failed conservative management with physical therapy/home exercises and non-narcotic medications.  At this time, we will consider advanced imaging and/or interventions to give them some additional relief for their pain.      PLAN:   - I have stressed the importance of physical activity and a home exercise plan to help with pain and improve health.  - Patient can continue with medications for now since they are providing benefits, using them appropriately, and without side effects.  - continue home exercises for lower back pain  - schedule for left SI joint injection (diagnostic - local only) with plan for SI arthrodesis, if appropriate  - RTC 2 weeks after intervention  - Counseled patient regarding the importance of activity modification and physical therapy.    The above plan and management options were discussed at length with patient. Patient is in agreement with the above and verbalized understanding.    Curt Fritz  07/15/2025             [1]   Current Outpatient Medications   Medication Sig Dispense Refill    azelastine-fluticasone (DYMISTA) 137-50 mcg/spray Spry nassal spray 1 spray by Each Nostril route 2 (two) times daily. 23 g 11    levocetirizine (XYZAL) 5 MG tablet TAKE 1 TABLET(5 MG) BY MOUTH EVERY EVENING 30 tablet 11    SOFDRA 12.45 % (72 mg /actuation) GlwP Apply 1 Dose topically once  daily.      tadalafiL (CIALIS) 5 MG tablet Take 1 tablet (5 mg total) by mouth once daily. 30 tablet 5     No current facility-administered medications for this visit.

## 2025-07-15 NOTE — TELEPHONE ENCOUNTER
----- Message from Curt Contreras sent at 7/15/2025  3:22 PM CDT -----  Regarding: Order for JULY PENA    Patient Name: JULY PENA([id number])  Sex: Male  : 1990      PCP: TOM SIMMS    Center: [facility]     Types of orders made on 07/15/2025: Procedure Request    Order Date:7/15/2025  Ordering User:CURT CONTRERAS [[id number]]  Encounter Provider:Curt Contreras MD [[id number]]  Authorizing Provider: Curt Contreras MD [[id number]]  Department:[facility]IN [facility]NA[facility][[id number]]    Common Order Information  Procedure -> Sacroiliac Injection (Specify laterality) Cmt: left SI joint             injection (Diagnostic)    Order Specific Information  Order: Procedure Order to Pain Management [Custom: [id number]]  Order #:          [id number]: 1 FUTURE    Priority: Routine  Class: Clinic Performed    Future Order Information      Expires on:07/15/2026            Expected by:07/15/2025                   Associated Diagnoses      M46.1 Sacroiliitis      Physician -> BEN         Is patient on anti-coagulants? -> No         Facility Name: -> [facility]         Follow-up: -> 2 weeks           Priority: Routine  Class: Clinic Performed    Future Order Information      Expires on:07/15/2026            Expected by:07/15/2025                   Associated Diagnoses      M46.1 Sacroiliitis      Procedure -> Sacroiliac Injection (Specify laterality) Cmt: left SI joint                 injection (Diagnostic)        Physician -> BEN         Is patient on anti-coagulants? -> No         Facility Name: -> [facility]         Follow-up: -> 2 weeks

## 2025-07-15 NOTE — H&P (VIEW-ONLY)
Chronic patient Established Note (Follow up visit)      SUBJECTIVE:  INTERVAL HISTORY 7/15/2025:  Mr. Denny returns for lower back pain. Current Pain level is 3-4/10.  He completed the healthy back program, and overall feels better in regards to his back, but he still feels he has sacroiliac joint pain.  He again attest excellent relief from his last left SI joint injection with improved quality of life, however the relief was short lived (less than 2 weeks).  He is seeking a longer lasting alternative.    INTERVAL HISTORY 2/24/2025:  Dong Denny presents to the clinic for a follow-up appointment for chronic pain. Current pain intensity is 0/10.  Since the last visit, Dong Denny states:  he reports that he had about 50% relief from his last left SI joint injection.  He feels his pain gets worse with left abduction and running.  He reports he hasn't really run since August    PRIOR HISTORY (2/11/25 - MAGUE Carlos):   Dong Denny is a 34 y.o. male who returns to me today for CT results.  He was last seen by me 11/4/2024.  Today he is doing well but notes he continues to have isolated left sided low back pain. He had a left SIJ injection on 11/22/24 with minimal relief   The Patient denies myelopathic symptoms such as handwriting changes or difficulty with buttons/coins/keys. Denies perineal paresthesias, bowel/bladder dysfunction.      Pain Disability Index Review:      7/15/2025     2:53 PM 2/24/2025     3:20 PM   Last 3 PDI Scores   Pain Disability Index (PDI) 28 0       Pain Medications:   - none    Opioid Contract: not applicable     report:  Reviewed and consistent with medication use as prescribed.    Pain Procedures:   11/5/2024 - left SI J - 75% improvement for about 2 weeks    Physical Therapy/Home Exercise: yes, with no significant improvement    Imaging:   CT PELVIS WITHOUT CONTRAST     CLINICAL HISTORY:  Pelvis pain, stress fracture suspected, neg xray;   Pelvic and perineal pain     TECHNIQUE:  CT of the pelvis without intravenous contrast.  Coronal and sagittal reformats were obtained.     COMPARISON:  CT from 10/14/2022     FINDINGS:  Visualized portions of the bowel demonstrate no focal wall thickening.  No evidence of obstruction.  Urinary bladder is normal in appearance.  Prostate is not enlarged.  No evidence of free fluid.  No lymphadenopathy.  Vascular structures demonstrate no calcifications.  No evidence of hernia.  Osseous structures demonstrate no evidence of acute fracture.  Osseous bridging of the left SI joint and mild degenerative changes of the right SI joint.  No evidence of erosions.  No osseous destructive process.     Impression:     No acute process in the pelvis.  No evidence of fracture.  Further evaluation as clinically indicated.        Electronically signed by:Keith Eckert MD  Date:                                            01/17/2025  Time:                                           08:50    XR THORACOLUMBAR SPINE AP LATERAL     CLINICAL HISTORY:  Sacrococcygeal disorders, not elsewhere classified     TECHNIQUE:  AP and lateral views of the thoracolumbar spine were performed.     COMPARISON:  None     FINDINGS:  Posterior vertebral alignment is satisfactory.  Vertebral body heights are well maintained.  There is no evidence for acute fracture or bone destruction.  There are mild degenerative changes within the thoracolumbar spine with narrowing of the L4-5 and L5-S1 intervertebral disc spaces.  There are calcific densities projecting over the left kidney and left renal calculi are suspected.     Impression:     No evidence for acute fracture, bone destruction, or subluxation.     Mild degenerative changes with mild disc space narrowing at the L4-5 and L5-S1 levels.     Left renal calculi.        Electronically signed by:Chang Rosales MD  Date:                                            02/21/2025  Time:                                            13:12      MRI LUMBAR SPINE WITHOUT CONTRAST     CLINICAL HISTORY:  Low back pain, symptoms persist with > 6wks conservative treatment; Dorsalgia, unspecified     TECHNIQUE:  Multiplanar, multisequence MR images were acquired from the thoracolumbar junction to the sacrum without the administration of contrast.     COMPARISON:  Radiograph 09/22/2023, CT abdomen pelvis 04/05/2024     FINDINGS:  The vertebral bodies demonstrate no evidence of fracture, osseous destructive process or aggressive bone marrow replacement process.     Normal sagittal alignment is preserved.  No spondylolisthesis.     Degenerative changes individual disc levels further discussed below:     L1-2: Mild disc desiccation subtle loss of disc height disc bulging.  No significant spinal stenosis or neural foraminal narrowing.     L2-3: No significant degenerative disc disease.  Mild facet arthropathy.  No spinal stenosis or neural foraminal narrowing.     L3-4: Mild disc bulging with superimposed right foraminal disc osteophyte complex.  This abuts the exiting right L3 nerve root.  No significant spinal canal stenosis or left-sided neural foraminal narrowing.  Modest facet arthropathy.     L4-5: Mild disc bulging with circumferential endplate marginal osteophyte formation.  No significant spinal stenosis.  Modest neural foraminal narrowing.     L5-S1: Annular fissure with shallow central/right paracentral disc protrusion.  This narrows the right lateral recess abutting the descending S1 nerve root.  No significant spinal canal stenosis or neural foraminal narrowing.     The terminal spinal cord, conus, and cauda equina demonstrate normal caliber, morphology, and signal.     No intraspinal mass or fluid collection.     No acute abnormalities in the visualized paraspinal soft tissue structures.     Impression:     Mild multilevel degenerative change, with individual disc levels further detailed above.        Electronically signed by:Tritsan  MD Andrew  Date:                                            11/01/2024  Time:                                           17:10    Allergies:   Review of patient's allergies indicates:  No Known Allergies    Current Medications:   Current Medications[1]    REVIEW OF SYSTEMS:    GENERAL:  No new weight loss, malaise or fevers.  HEENT:  Negative for new frequent or significant headaches.  NECK:  Negative for new lumps, goiter, and significant neck swelling.  RESPIRATORY:  Negative for new cough, wheezing or shortness of breath.  CARDIOVASCULAR:  Negative for new chest pain, leg swelling or palpitations.  GI:  Negative for new abdominal discomfort, blood in stools or black stools or change in bowel habits.  MUSCULOSKELETAL:  See HPI.  SKIN:  Negative for new lesions, rash, and itching.  PSYCH:  Negative for new sleep disturbance, mood disorder  HEMATOLOGY/LYMPHOLOGY:  Negative for new prolonged bleeding, bruising easily or swollen nodes.  NEURO:   No new headaches, syncope, paralysis, seizures or tremors.  All other reviewed and negative other than HPI.    Past Medical History:  Past Medical History:   Diagnosis Date    Androgenetic alopecia     Chronic rhinosinusitis     Dyslipidemia 2022    History of kidney stones 10/21/2024    Kidney stones     Low testosterone        Past Surgical History:  Past Surgical History:   Procedure Laterality Date    INJECTION, SACROILIAC JOINT Left 11/22/2024    Procedure: LT SI Joint inj;  Surgeon: Curt Fritz MD;  Location: [facility];  Service: Pain Management;  Laterality: Left;  no sed  no AC    TOE SURGERY Left 2015    TONSILLECTOMY      TURBINATE RESECTION Bilateral 2010, 2006    done twice    TYMPANOSTOMY TUBE PLACEMENT Bilateral 1990       Family History:  Family History   Problem Relation Name Age of Onset    Celiac disease Mother      Diabetes Father Camron     No Known Problems Sister      No Known Problems Maternal Grandmother      No Known Problems Maternal  "Grandfather      No Known Problems Paternal Grandmother      No Known Problems Paternal Grandfather         Social History:  Social History     Socioeconomic History    Marital status:    Occupational History    Occupation:    Tobacco Use    Smoking status: Never    Smokeless tobacco: Never   Substance and Sexual Activity    Alcohol use: Yes     Alcohol/week: 2.0 standard drinks of alcohol     Types: 2 Standard drinks or equivalent per week     Comment: socially    Drug use: No    Sexual activity: Yes     Partners: Female     Birth control/protection: None     Social Drivers of Health     Financial Resource Strain: Low Risk  (2/11/2025)    Overall Financial Resource Strain (CARDIA)     Difficulty of Paying Living Expenses: Not hard at all   Food Insecurity: No Food Insecurity (2/11/2025)    Hunger Vital Sign     Worried About Running Out of Food in the Last Year: Never true     Ran Out of Food in the Last Year: Never true   Transportation Needs: No Transportation Needs (2/11/2025)    PRAPARE - Transportation     Lack of Transportation (Medical): No     Lack of Transportation (Non-Medical): No   Physical Activity: Sufficiently Active (2/11/2025)    Exercise Vital Sign     Days of Exercise per Week: 5 days     Minutes of Exercise per Session: 30 min   Stress: No Stress Concern Present (2/11/2025)    [employer] - Occupational Stress Questionnaire     Feeling of Stress : Only a little   Housing Stability: Low Risk  (2/11/2025)    Housing Stability Vital Sign     Unable to Pay for Housing in the Last Year: No     Homeless in the Last Year: No       OBJECTIVE:    /76 (BP Location: Left arm, Patient Position: Sitting)   Pulse 71   Ht 5' 9" (1.753 m)   Wt 74.3 kg (163 lb 12.8 oz)   BMI 24.19 kg/m²     PHYSICAL EXAMINATION:  General appearance: Well appearing, in no acute distress, alert and appropriately communicative.  Psych:  Mood and affect " appropriate.  Skin: Skin color, texture, turgor normal, no rashes or lesions, in both upper and lower body for exposed skin.  Head/face:  Atraumatic, normocephalic.  Cor: regular rate  Pulm: non-labored breathing  GI: Abdomen non-distended and non-tender.  Back: Straight leg raising in the sitting and supine positions is negative to radicular pain. No pain to palpation over the spine or paraspinal muscles. Normal range of motion without pain reproduction.  Extremities: No deformities, significant lymphedema, or skin discoloration. No significant open wounds. No major amputations.  Musculoskeletal: hip, sacroiliac and knee provocative maneuvers are negative with the exception of left SI tenderness, +left Elaine. Bilateral upper and lower extremity strength is normal and symmetric.  No atrophy or tone abnormalities are noted.  Neuro: Bilateral upper and lower extremity coordination and muscle stretch reflexes abnormalities noted: none.  [place] and/or Clonus: negative; loss of sensation to light touch: none  Gait: normal    CMP  Sodium   Date Value Ref Range Status   03/28/2025 143 136 - 145 mmol/L Final   07/26/2024 140 136 - 145 mmol/L Final     Potassium   Date Value Ref Range Status   03/28/2025 5.1 3.5 - 5.1 mmol/L Final   07/26/2024 4.5 3.5 - 5.1 mmol/L Final     Chloride   Date Value Ref Range Status   03/28/2025 110 95 - 110 mmol/L Final   07/26/2024 106 95 - 110 mmol/L Final     CO2   Date Value Ref Range Status   03/28/2025 24 23 - 29 mmol/L Final   07/26/2024 27 23 - 29 mmol/L Final     Glucose   Date Value Ref Range Status   03/28/2025 105 70 - 110 mg/dL Final   07/26/2024 95 70 - 110 mg/dL Final     BUN   Date Value Ref Range Status   03/28/2025 15 6 - 20 mg/dL Final     Creatinine   Date Value Ref Range Status   03/28/2025 0.8 0.5 - 1.4 mg/dL Final     Calcium   Date Value Ref Range Status   03/28/2025 9.4 8.7 - 10.5 mg/dL Final   07/26/2024 9.5 8.7 - 10.5 mg/dL Final     Protein Total   Date Value Ref  Range Status   03/28/2025 7.1 6.0 - 8.4 gm/dL Final     Total Protein   Date Value Ref Range Status   07/26/2024 7.0 6.0 - 8.4 g/dL Final     Albumin   Date Value Ref Range Status   03/28/2025 4.4 3.5 - 5.2 g/dL Final   07/26/2024 4.4 3.5 - 5.2 g/dL Final   09/25/2017 4.9 3.6 - 5.1 g/dL Final     Comment:     @ Test Performed By:  [facility] SIMRAN Rome M.D..,   [id number] [place] [id number]  [facility]  [id number]       Total Bilirubin   Date Value Ref Range Status   07/26/2024 0.5 0.1 - 1.0 mg/dL Final     Comment:     For infants and newborns, interpretation of results should be based  on gestational age, weight and in agreement with clinical  observations.    Premature Infant recommended reference ranges:  Up to 24 hours.............<8.0 mg/dL  Up to 48 hours............<12.0 mg/dL  3-5 days..................<15.0 mg/dL  6-29 days.................<15.0 mg/dL       Bilirubin Total   Date Value Ref Range Status   03/28/2025 0.3 0.1 - 1.0 mg/dL Final     Comment:     For infants and newborns, interpretation of results should be based   on gestational age, weight and in agreement with clinical   observations.    Premature Infant recommended reference ranges:   0-24 hours:  <8.0 mg/dL   24-48 hours: <12.0 mg/dL   3-5 days:    <15.0 mg/dL   6-29 days:   <15.0 mg/dL     Alkaline Phosphatase   Date Value Ref Range Status   07/26/2024 40 (L) 55 - 135 U/L Final     ALP   Date Value Ref Range Status   03/28/2025 44 40 - 150 unit/L Final     AST   Date Value Ref Range Status   03/28/2025 14 11 - 45 unit/L Final   07/26/2024 15 10 - 40 U/L Final     ALT   Date Value Ref Range Status   03/28/2025 27 10 - 44 unit/L Final   07/26/2024 24 10 - 44 U/L Final     Anion Gap   Date Value Ref Range Status   03/28/2025 9 8 - 16 mmol/L Final     eGFR   Date Value Ref Range Status   03/28/2025 >60 >60 mL/min/1.73/m2 Final     Comment:     Estimated GFR calculated using the  CKD-EPI creatinine (2021) equation.   07/26/2024 >60.0 >60 mL/min/1.73 m^2 Final     ASSESSMENT: 34 y.o. year old male with chronic pain, consistent with:     1. Sacroiliitis  Procedure Order to Pain Management      2. Degeneration of intervertebral disc of lumbar region with discogenic back pain          IMPRESSION: Dong Denny presents today for left lower back pain. History and physical exam are consistent with left sacroiliitis.  Imaging is consistent with bilateral sacroiliac joint degenerative disease.  He had excellent, but short lived relief from his bilateral SI joint injections.  They have failed conservative management with physical therapy/home exercises and non-narcotic medications.  At this time, we will consider advanced imaging and/or interventions to give them some additional relief for their pain.      PLAN:   - I have stressed the importance of physical activity and a home exercise plan to help with pain and improve health.  - Patient can continue with medications for now since they are providing benefits, using them appropriately, and without side effects.  - continue home exercises for lower back pain  - schedule for left SI joint injection (diagnostic - local only) with plan for SI arthrodesis, if appropriate  - RTC 2 weeks after intervention  - Counseled patient regarding the importance of activity modification and physical therapy.    The above plan and management options were discussed at length with patient. Patient is in agreement with the above and verbalized understanding.    Curt Fritz  07/15/2025             [1]   Current Outpatient Medications   Medication Sig Dispense Refill    azelastine-fluticasone (DYMISTA) 137-50 mcg/spray Spry nassal spray 1 spray by Each Nostril route 2 (two) times daily. 23 g 11    levocetirizine (XYZAL) 5 MG tablet TAKE 1 TABLET(5 MG) BY MOUTH EVERY EVENING 30 tablet 11    SOFDRA 12.45 % (72 mg /actuation) GlwP Apply 1 Dose topically once  daily.      tadalafiL (CIALIS) 5 MG tablet Take 1 tablet (5 mg total) by mouth once daily. 30 tablet 5     No current facility-administered medications for this visit.

## 2025-07-25 ENCOUNTER — TELEPHONE (OUTPATIENT)
Dept: PAIN MEDICINE | Facility: CLINIC | Age: 35
End: 2025-07-25
Payer: COMMERCIAL

## 2025-07-30 ENCOUNTER — TELEPHONE (OUTPATIENT)
Dept: PAIN MEDICINE | Facility: HOSPITAL | Age: 35
End: 2025-07-30
Payer: COMMERCIAL

## 2025-08-01 ENCOUNTER — HOSPITAL ENCOUNTER (OUTPATIENT)
Facility: HOSPITAL | Age: 35
Discharge: HOME | End: 2025-08-01
Attending: STUDENT IN AN ORGANIZED HEALTH CARE EDUCATION/TRAINING PROGRAM | Admitting: STUDENT IN AN ORGANIZED HEALTH CARE EDUCATION/TRAINING PROGRAM
Payer: COMMERCIAL

## 2025-08-01 VITALS
OXYGEN SATURATION: 98 % | TEMPERATURE: 99 F | SYSTOLIC BLOOD PRESSURE: 142 MMHG | WEIGHT: 160 LBS | HEART RATE: 82 BPM | BODY MASS INDEX: 23.7 KG/M2 | HEIGHT: 69 IN | DIASTOLIC BLOOD PRESSURE: 78 MMHG | RESPIRATION RATE: 17 BRPM

## 2025-08-01 DIAGNOSIS — M46.1 SACROILIITIS: Primary | ICD-10-CM

## 2025-08-01 PROCEDURE — 25500020 PHARM REV CODE 255: Performed by: STUDENT IN AN ORGANIZED HEALTH CARE EDUCATION/TRAINING PROGRAM

## 2025-08-01 PROCEDURE — 27096 INJECT SACROILIAC JOINT: CPT | Mod: LT,,, | Performed by: STUDENT IN AN ORGANIZED HEALTH CARE EDUCATION/TRAINING PROGRAM

## 2025-08-01 PROCEDURE — 63600175 PHARM REV CODE 636 W HCPCS: Performed by: STUDENT IN AN ORGANIZED HEALTH CARE EDUCATION/TRAINING PROGRAM

## 2025-08-01 PROCEDURE — 27096 INJECT SACROILIAC JOINT: CPT | Mod: LT | Performed by: STUDENT IN AN ORGANIZED HEALTH CARE EDUCATION/TRAINING PROGRAM

## 2025-08-01 RX ORDER — BUPIVACAINE HYDROCHLORIDE 2.5 MG/ML
INJECTION, SOLUTION EPIDURAL; INFILTRATION; INTRACAUDAL; PERINEURAL
Status: DISCONTINUED | OUTPATIENT
Start: 2025-08-01 | End: 2025-08-01 | Stop reason: HOSPADM

## 2025-08-01 RX ORDER — LIDOCAINE HYDROCHLORIDE 20 MG/ML
INJECTION, SOLUTION EPIDURAL; INFILTRATION; INTRACAUDAL; PERINEURAL
Status: DISCONTINUED | OUTPATIENT
Start: 2025-08-01 | End: 2025-08-01 | Stop reason: HOSPADM

## 2025-08-01 NOTE — DISCHARGE SUMMARY
Discharge Note  Short Stay      SUMMARY     Admit Date: 8/1/2025    Attending Physician: Cutr Fritz MD PhD    Discharge Physician: Curt Fritz      Discharge Date: 8/1/2025 2:56 PM    Procedure(s) (LRB):  left SI joint injection (Diagnostic) (Left)    Final Diagnosis: Sacroiliitis [M46.1]    Disposition: Home or self care    Patient Instructions:   Current Discharge Medication List        CONTINUE these medications which have NOT CHANGED    Details   azelastine-fluticasone (DYMISTA) 137-50 mcg/spray Spry nassal spray 1 spray by Each Nostril route 2 (two) times daily.  Qty: 23 g, Refills: 11      levocetirizine (XYZAL) 5 MG tablet TAKE 1 TABLET(5 MG) BY MOUTH EVERY EVENING  Qty: 30 tablet, Refills: 11    Associated Diagnoses: Allergic rhinitis, unspecified seasonality, unspecified trigger; Chronic rhinitis      SOFDRA 12.45 % (72 mg /actuation) GlwP Apply 1 Dose topically once daily.      tadalafiL (CIALIS) 5 MG tablet Take 1 tablet (5 mg total) by mouth once daily.  Qty: 30 tablet, Refills: 5                 Discharge Diagnosis: Sacroiliitis [M46.1]  Condition on Discharge: Stable with no complications to procedure   Diet on Discharge: Same as before.  Activity: as per instruction sheet.  Discharge to: Home with a responsible adult.  Follow up: 2-4 weeks       Please call my office or pager at [phone] if experienced any weakness or loss of sensation, fever > 101.5, pain uncontrolled with oral medications, persistent nausea/vomiting/or diarrhea, redness or drainage from the incisions, or any other worrisome concerns. If physician on call was not reached or could not communicate with our office for any reason please go to the nearest emergency department      Curt Fritz MD PhD

## 2025-08-01 NOTE — OP NOTE
Sacroiliac Joint Injection (LOCAL ONLY) under Fluoroscopic Guidance    The procedure, risks, benefits, and options were discussed with the patient. There are no contraindications to the procedure. The patent expressed understanding and agreed to the procedure. Informed written consent was obtained prior to the start of the procedure and can be found in the patient's chart.    PATIENT NAME: Dong Denny   MRN: [id number]     DATE OF PROCEDURE: 08/01/2025    PROCEDURE: Left Sacroiliac Joint Injection (LOCAL ONLY) under Fluoroscopic Guidance    PRE-OP DIAGNOSIS: Sacroiliitis [M46.1]    POST-OP DIAGNOSIS: Same    PHYSICIAN: Curt Fritz MD    ASSISTANTS: None     MEDICATIONS INJECTED: 2cc of Bupivacine 0.25%     LOCAL ANESTHETIC INJECTED: Xylocaine 2%     SEDATION: None    ESTIMATED BLOOD LOSS: None    COMPLICATIONS: None    TECHNIQUE: Time-out was performed to identify the patient and procedure to be performed. With the patient laying in a prone position, the surgical area was prepped and draped in the usual sterile fashion using ChloraPrep and a fenestrated drape. The sacroiliac joint was determined under fluoroscopy guidance. Skin anesthesia was achieved by injecting Lidocaine 2% over the injection site. The sacroiliac joint was  then approached with a 22 gauge, 3.5 inch spinal quinke needle that was introduced under fluoroscopic guidance in the AP and Lateral views. Once the needle tip was in the area of the joint, and there was no blood, contrast dye Omnipaque (300mg/mL) was injected to confirm placement and there was no vascular runoff. Fluoroscopic imaging in the AP and lateral views revealed a clear outline of the joint space. 2 mL of the medication mixture listed above was injected slowly intraarticular and pili-articular. Displacement of the radio opaque contrast after injection of the medication confirmed that the medication went into the area of the joint. The needles were removed and bleeding was nil.  A  sterile dressing was applied. No specimens collected. The patient tolerated the procedure well.     PRE-PROCEDURE PAIN SCORE: 8/10    POST-PROCEDURE PAIN SCORE: 0/10    The patient was monitored after the procedure in the recovery area. They were given post-procedure and discharge instructions to follow at home. The patient was discharged in a stable condition.    Curt Fritz MD

## 2025-08-01 NOTE — INTERVAL H&P NOTE
The patient was examined and no significant changes were noted from the updated H&P or last clinic note.    The risks and benefits of this procedure, including alternative therapies, were discussed with the patient.  The discussion of risks included infection, bleeding, need for additional procedures or surgery, nerve damage, paralysis, adverse medication reaction(s), stroke, and if appropriate for the procedure, death.  Questions regarding the procedure, risks, expected outcome, and possible side effects were solicited and answered to Dong's satisfaction.  Dong Eduin wishes to proceed with the injection or procedure as confirmed by written consent.

## 2025-08-01 NOTE — PLAN OF CARE
Discharge instructions given and explained to patient  with verbalization of understanding all instructions. Patients v/s stable, denies n/v and tolerating po, rates pain level tolerable, IV removed, and ready for patient discharge home.       Bandage(s) are intact with no to scant serous drainage over procedural site.  There is no edema or skin discoloration noted at site.  Pt. Encourage to refrain from placing any heat in area or emerging in water of any type for the next 48-72 hrs and shower next day after removing bandage. To observe area for the next several days for outward signs of infection/inflammation and report to Dr. Fritz if noticing such.

## 2025-08-01 NOTE — DISCHARGE INSTRUCTIONS
[facility]/[place]  Dr. Curt Fritz  Messaging service # [phone]    POST-PROCEDURE INSTRUCTIONS:  HELPFUL TIPS:    Except for block procedures (medial branch blocks, genicular blocks, hip/shoulder blocks), most procedures take about 2 weeks to start seeing the full effect toward your pain.  If you feel like the pain relief goes away after a day, please wait up to 2 weeks to decide if the procedure provided you any relief    If you had a block procedure (medial branch blocks, genicular blocks, hip/shoulder blocks), you MUST submit your pain diary and percentage relief scores to proceed with the next block and/or procedure.  Please submit the diary/percentages through the [facility] OR you can call [phone] ([facility]) OR [phone] ([facility]) during clinic hours (Monday - Friday, 8AM - 5PM)    Follow up in 2 weeks with either the provider that suggested this procedure OR with Dr. Fritz (including his team members at [facility]).  You may already have a follow up appointment scheduled.  If not, you may make an appointment through the [facility] or you can contact the office that suggested your procedure.  If you would like to make an appointment with Dr. Fritz, you may do so through the [facility] OR you can call [phone] ([facility]) OR [phone] ([facility]) during clinic hours (Monday - Friday, 8AM - 5PM).      What you need to do:    Keep a record of your response to the injection you had today.    How much relief did you get?   When did the relief start and how long did it last?  Were you able to decrease the use of any of your pain medications?  Were you able to increase your level of activity?  How long did the relief last?    What to watch out for:    If you experience any of the following symptoms after your procedure, please notify the messaging service immediately (see above for contact information):   fever (increased oral temperature)   bleeding or  swelling at the injection site,    drainage, rash or redness at the injection site    possible signs of infection    increased pain at the injection site   worsening of your usual pain   severe headache   new or worsening numbness    new arm and/or leg weakness, or    changes in bowel and/or bladder function: urinating or defecating on yourself and not knowing that you did it.    PLEASE FOLLOW ALL INSTRUCTIONS CAREFULLY     Do not engage in strenuous activity (e.g., lifting or pushing heavy objects or repeated bending) for 24 hours.     Do not take a bath, swim or use Jacuzzi for 24 hours after procedure. (A shower is fine).   Remove any Band-Aids when you get home.    Use cold/ice, as needed for comfort.  We recommend the use of cold therapy alternating on for 20 minutes, off for 20 minutes.    Do not apply direct heat (heating pad or heat packs) to the injection site for 24 hours.     Resume your usual medications, unless instructed otherwise by your Pain Physician.     If you are on warfarin (Coumadin) or other blood thinner, resume this medication as instructed by your prescribing Physician.    IF AT ANY POINT YOU ARE VERY CONCERNED ABOUT YOUR SYMPTOMS, PLEASE GO TO THE EMERGENCY ROOM.    If you develop worsening pain, weakness, numbness, lose bowel or bladder control (i.e., having an accident where you did not even know you had to go to the bathroom and suddenly noticed you soiled yourself), saddle anesthesia (a loss of sensation restricted to the area of the buttocks, anus and between the legs -- i.e., those parts of your body that would touch a saddle if you were sitting on one) you need to go immediately to the emergency department for evaluation and treatment.    ----------------------------------------------------------------------------------------------------------------------------------------------------------------  If you received Sedation please read the following instructions:  POST SEDATION  INSTRUCTIONS    Today you received intravenous medication (also known as sedation) that was used to help you relax and/or decrease discomfort during your procedure. This medication will be acting in your body for the next 24 hours, so you might feel a little tired or sleepy. This feeling will slowly wear off.   Common side effects associated with these medications include: drowsiness, dizziness, sleepiness, confusion, feeling excited, difficulty remembering things, lack of steadiness with walking or balance, loss of fine muscle control, slowed reflexes, difficulty focusing, and blurred vision.  Some over-the-counter and prescription medications (e.g., muscle relaxants, opioids, mood-altering medications, sedatives/hypnotics, antihistamines) can interact with the intravenous medication you received and cause an increased risk of the side effects listed above in addition to other potentially life threatening side effects. Use extreme caution if you are taking such medications, and consult with your Pain Physician or prescribing physician if you have any questions.  For the next 12-24 hours:    DO NOT--Drive a car, operate machinery or power tools   DO NOT--Drink any alcoholic beverages (not even beer), they may dangerously increase the risk of side effects.    DO NOT--Make any important legal or business decisions or sign important documents.  We advise you to have someone to assist you at home. Move slowly and carefully. Do not make sudden changes in position. Be aware of dizziness or light-headedness and move accordingly.   If you seek medical treatment within 24 hours, let the nurse or doctor caring for you know that you have received the above medications. If you have any questions or concerns related to your sedation or treatment today please contact us.

## 2025-08-22 ENCOUNTER — PATIENT MESSAGE (OUTPATIENT)
Dept: PAIN MEDICINE | Facility: CLINIC | Age: 35
End: 2025-08-22
Payer: COMMERCIAL

## (undated) DEVICE — SUT 3/0 18IN COATED VICRYLP

## (undated) DEVICE — TRAY MINOR ORTHO

## (undated) DEVICE — SPONGE DERMACEA GAUZE 4X4

## (undated) DEVICE — SEE MEDLINE ITEM 152529

## (undated) DEVICE — GAUZE SPONGE BULKEE 6X6.75IN

## (undated) DEVICE — DRESSING XEROFORM FOIL PK 1X8

## (undated) DEVICE — SEE MEDLINE ITEM 154981

## (undated) DEVICE — SEE L#120831

## (undated) DEVICE — NDL HYPO REG 25G X 1 1/2

## (undated) DEVICE — DRAPE STERI-DRAPE 1000 17X11IN

## (undated) DEVICE — SPONGE GAUZE 16PLY 4X4

## (undated) DEVICE — SEE MEDLINE ITEM 157131

## (undated) DEVICE — DRESSING SPONGE 16PLY 4X4 NS

## (undated) DEVICE — SUT VICRYL 3-0 27 SH

## (undated) DEVICE — BLADE MEDIUM 9MM X 25MM

## (undated) DEVICE — PADDING CAST 4IN

## (undated) DEVICE — SEE MEDLINE ITEM 146270

## (undated) DEVICE — DRESSING XEROFORM 1X8IN

## (undated) DEVICE — DRAPE PLASTIC U 60X72

## (undated) DEVICE — SYR 10CC LUER LOCK

## (undated) DEVICE — SEE MEDLINE ITEM 152515

## (undated) DEVICE — SEE MEDLINE ITEM 152522

## (undated) DEVICE — SEE MEDLINE ITEM 146298

## (undated) DEVICE — SUT ETHILON 4-0 PS2 18 BLK

## (undated) DEVICE — STOCKINET 4INX48

## (undated) DEVICE — SEE MEDLINE ITEM 157216

## (undated) DEVICE — GAUZE SPONGE 4X4 12PLY

## (undated) DEVICE — BLADE SURG #15 CARBON STEEL

## (undated) DEVICE — FORCEP STRAIGHT DISP

## (undated) DEVICE — PAD CAST SPECIALIST STRL 4

## (undated) DEVICE — BLADE MICRO SAGGITAL SAW 5/BX